# Patient Record
Sex: MALE | Race: WHITE | NOT HISPANIC OR LATINO | Employment: UNEMPLOYED | URBAN - NONMETROPOLITAN AREA
[De-identification: names, ages, dates, MRNs, and addresses within clinical notes are randomized per-mention and may not be internally consistent; named-entity substitution may affect disease eponyms.]

---

## 2017-08-29 PROBLEM — R79.89 LOW VITAMIN B12 LEVEL: Status: ACTIVE | Noted: 2017-08-29

## 2023-01-11 PROBLEM — Z86.0100 HISTORY OF COLON POLYPS: Status: ACTIVE | Noted: 2023-01-11

## 2023-11-28 ENCOUNTER — APPOINTMENT (OUTPATIENT)
Dept: GENERAL RADIOLOGY | Facility: HOSPITAL | Age: 48
End: 2023-11-28
Payer: MEDICAID

## 2023-11-28 ENCOUNTER — HOSPITAL ENCOUNTER (EMERGENCY)
Facility: HOSPITAL | Age: 48
Discharge: HOME | End: 2023-11-28
Attending: EMERGENCY MEDICINE | Admitting: EMERGENCY MEDICINE
Payer: MEDICAID

## 2023-11-28 ENCOUNTER — APPOINTMENT (OUTPATIENT)
Dept: CT IMAGING | Facility: HOSPITAL | Age: 48
End: 2023-11-28
Payer: MEDICAID

## 2023-11-28 VITALS
SYSTOLIC BLOOD PRESSURE: 145 MMHG | WEIGHT: 150 LBS | HEART RATE: 82 BPM | TEMPERATURE: 98.2 F | OXYGEN SATURATION: 95 % | DIASTOLIC BLOOD PRESSURE: 83 MMHG | BODY MASS INDEX: 21.47 KG/M2 | RESPIRATION RATE: 18 BRPM | HEIGHT: 70 IN

## 2023-11-28 DIAGNOSIS — K27.9 PUD (PEPTIC ULCER DISEASE): Chronic | ICD-10-CM

## 2023-11-28 DIAGNOSIS — K29.70 GASTRITIS, PRESENCE OF BLEEDING UNSPECIFIED, UNSPECIFIED CHRONICITY, UNSPECIFIED GASTRITIS TYPE: Primary | ICD-10-CM

## 2023-11-28 LAB
ALBUMIN SERPL-MCNC: 4.5 G/DL (ref 3.5–5.2)
ALBUMIN/GLOB SERPL: 1.1 G/DL
ALP SERPL-CCNC: 92 U/L (ref 39–117)
ALT SERPL W P-5'-P-CCNC: 19 U/L (ref 1–41)
ANION GAP SERPL CALCULATED.3IONS-SCNC: 13.6 MMOL/L (ref 5–15)
AST SERPL-CCNC: 21 U/L (ref 1–40)
BASOPHILS # BLD AUTO: 0.03 10*3/MM3 (ref 0–0.2)
BASOPHILS NFR BLD AUTO: 0.3 % (ref 0–1.5)
BILIRUB SERPL-MCNC: <0.2 MG/DL (ref 0–1.2)
BUN SERPL-MCNC: 13 MG/DL (ref 6–20)
BUN/CREAT SERPL: 14.6 (ref 7–25)
CALCIUM SPEC-SCNC: 9.2 MG/DL (ref 8.6–10.5)
CHLORIDE SERPL-SCNC: 97 MMOL/L (ref 98–107)
CO2 SERPL-SCNC: 25.4 MMOL/L (ref 22–29)
CREAT SERPL-MCNC: 0.89 MG/DL (ref 0.76–1.27)
DEPRECATED RDW RBC AUTO: 49.1 FL (ref 37–54)
EGFRCR SERPLBLD CKD-EPI 2021: 105.1 ML/MIN/1.73
EOSINOPHIL # BLD AUTO: 0.03 10*3/MM3 (ref 0–0.4)
EOSINOPHIL NFR BLD AUTO: 0.3 % (ref 0.3–6.2)
ERYTHROCYTE [DISTWIDTH] IN BLOOD BY AUTOMATED COUNT: 14.6 % (ref 12.3–15.4)
FLUAV SUBTYP SPEC NAA+PROBE: NOT DETECTED
FLUBV RNA ISLT QL NAA+PROBE: NOT DETECTED
GLOBULIN UR ELPH-MCNC: 4.1 GM/DL
GLUCOSE SERPL-MCNC: 129 MG/DL (ref 65–99)
HCT VFR BLD AUTO: 41.8 % (ref 37.5–51)
HGB BLD-MCNC: 13.9 G/DL (ref 13–17.7)
IMM GRANULOCYTES # BLD AUTO: 0.03 10*3/MM3 (ref 0–0.05)
IMM GRANULOCYTES NFR BLD AUTO: 0.3 % (ref 0–0.5)
LIPASE SERPL-CCNC: 17 U/L (ref 13–60)
LYMPHOCYTES # BLD AUTO: 1.25 10*3/MM3 (ref 0.7–3.1)
LYMPHOCYTES NFR BLD AUTO: 12.5 % (ref 19.6–45.3)
MCH RBC QN AUTO: 30.3 PG (ref 26.6–33)
MCHC RBC AUTO-ENTMCNC: 33.3 G/DL (ref 31.5–35.7)
MCV RBC AUTO: 91.3 FL (ref 79–97)
MONOCYTES # BLD AUTO: 0.31 10*3/MM3 (ref 0.1–0.9)
MONOCYTES NFR BLD AUTO: 3.1 % (ref 5–12)
NEUTROPHILS NFR BLD AUTO: 8.38 10*3/MM3 (ref 1.7–7)
NEUTROPHILS NFR BLD AUTO: 83.5 % (ref 42.7–76)
NRBC BLD AUTO-RTO: 0 /100 WBC (ref 0–0.2)
PLATELET # BLD AUTO: 288 10*3/MM3 (ref 140–450)
PMV BLD AUTO: 9.4 FL (ref 6–12)
POTASSIUM SERPL-SCNC: 3.5 MMOL/L (ref 3.5–5.2)
PROT SERPL-MCNC: 8.6 G/DL (ref 6–8.5)
RBC # BLD AUTO: 4.58 10*6/MM3 (ref 4.14–5.8)
S PYO AG THROAT QL: NEGATIVE
SARS-COV-2 RNA RESP QL NAA+PROBE: NOT DETECTED
SODIUM SERPL-SCNC: 136 MMOL/L (ref 136–145)
TROPONIN T SERPL HS-MCNC: 10 NG/L
WBC NRBC COR # BLD AUTO: 10.03 10*3/MM3 (ref 3.4–10.8)

## 2023-11-28 PROCEDURE — 84484 ASSAY OF TROPONIN QUANT: CPT

## 2023-11-28 PROCEDURE — 87081 CULTURE SCREEN ONLY: CPT

## 2023-11-28 PROCEDURE — 74177 CT ABD & PELVIS W/CONTRAST: CPT

## 2023-11-28 PROCEDURE — 25810000003 SODIUM CHLORIDE 0.9 % SOLUTION

## 2023-11-28 PROCEDURE — 96375 TX/PRO/DX INJ NEW DRUG ADDON: CPT

## 2023-11-28 PROCEDURE — 96374 THER/PROPH/DIAG INJ IV PUSH: CPT

## 2023-11-28 PROCEDURE — 85025 COMPLETE CBC W/AUTO DIFF WBC: CPT

## 2023-11-28 PROCEDURE — 99285 EMERGENCY DEPT VISIT HI MDM: CPT

## 2023-11-28 PROCEDURE — 83690 ASSAY OF LIPASE: CPT

## 2023-11-28 PROCEDURE — 87636 SARSCOV2 & INF A&B AMP PRB: CPT

## 2023-11-28 PROCEDURE — 87880 STREP A ASSAY W/OPTIC: CPT

## 2023-11-28 PROCEDURE — 80053 COMPREHEN METABOLIC PANEL: CPT

## 2023-11-28 PROCEDURE — 93005 ELECTROCARDIOGRAM TRACING: CPT

## 2023-11-28 PROCEDURE — 96361 HYDRATE IV INFUSION ADD-ON: CPT

## 2023-11-28 PROCEDURE — 71045 X-RAY EXAM CHEST 1 VIEW: CPT

## 2023-11-28 PROCEDURE — 25510000001 IOPAMIDOL 61 % SOLUTION: Performed by: EMERGENCY MEDICINE

## 2023-11-28 PROCEDURE — 25010000002 ONDANSETRON PER 1 MG

## 2023-11-28 RX ORDER — PANTOPRAZOLE SODIUM 40 MG/1
40 TABLET, DELAYED RELEASE ORAL DAILY
Qty: 90 TABLET | Refills: 0 | Status: SHIPPED | OUTPATIENT
Start: 2023-11-28

## 2023-11-28 RX ORDER — ONDANSETRON 2 MG/ML
4 INJECTION INTRAMUSCULAR; INTRAVENOUS ONCE
Status: COMPLETED | OUTPATIENT
Start: 2023-11-28 | End: 2023-11-28

## 2023-11-28 RX ORDER — SUCRALFATE 1 G/1
1 TABLET ORAL 3 TIMES DAILY
Qty: 42 TABLET | Refills: 0 | Status: SHIPPED | OUTPATIENT
Start: 2023-11-28 | End: 2023-12-12

## 2023-11-28 RX ORDER — ONDANSETRON 4 MG/1
4 TABLET, ORALLY DISINTEGRATING ORAL EVERY 8 HOURS PRN
Qty: 20 TABLET | Refills: 0 | Status: SHIPPED | OUTPATIENT
Start: 2023-11-28

## 2023-11-28 RX ORDER — PANTOPRAZOLE SODIUM 40 MG/10ML
40 INJECTION, POWDER, LYOPHILIZED, FOR SOLUTION INTRAVENOUS ONCE
Status: COMPLETED | OUTPATIENT
Start: 2023-11-28 | End: 2023-11-28

## 2023-11-28 RX ORDER — ACETAMINOPHEN 325 MG/1
975 TABLET ORAL ONCE
Status: COMPLETED | OUTPATIENT
Start: 2023-11-28 | End: 2023-11-28

## 2023-11-28 RX ADMIN — ONDANSETRON 4 MG: 2 INJECTION INTRAMUSCULAR; INTRAVENOUS at 22:38

## 2023-11-28 RX ADMIN — ACETAMINOPHEN 975 MG: 325 TABLET, FILM COATED ORAL at 21:41

## 2023-11-28 RX ADMIN — PANTOPRAZOLE SODIUM 40 MG: 40 INJECTION, POWDER, FOR SOLUTION INTRAVENOUS at 23:26

## 2023-11-28 RX ADMIN — IOPAMIDOL 100 ML: 612 INJECTION, SOLUTION INTRAVENOUS at 22:25

## 2023-11-28 RX ADMIN — SODIUM CHLORIDE 500 ML: 9 INJECTION, SOLUTION INTRAVENOUS at 21:46

## 2023-11-29 NOTE — ED PROVIDER NOTES
"Subjective  History of Present Illness:    This is a 48-year-old female present emergency room today for evaluation of nausea vomiting abdominal pain.  Presents via emergency medical services.  1 day history of vomiting.  Had cough congestion runny nose for the last week.  No sore throat.  No chest pain no shortness of air.  No diarrhea.  No hemoptysis, no hematemesis, no melena or hematochezia.  No dysuria hematuria urgency or frequency.  Describes his abdominal pain is \"queasy feeling\".  Reports just feeling overall fatigued and rundown.  No known sick contacts.  Received 4 mg of Zofran IV prior to arrival.  Denies any IV drug use.  Denies any alcohol.  Pain is described as burning sensation.  He does also tell me that he has been out of his Protonix recently.      Nurses Notes reviewed and agree, including vitals, allergies, social history and prior medical history.     REVIEW OF SYSTEMS: All systems reviewed and not pertinent unless noted.  Review of Systems   Constitutional:  Positive for fatigue. Negative for fever.   HENT:  Positive for congestion and rhinorrhea. Negative for sore throat and trouble swallowing.    Respiratory:  Positive for cough. Negative for shortness of breath.    Cardiovascular:  Negative for chest pain.   Gastrointestinal:  Positive for abdominal pain, nausea and vomiting. Negative for diarrhea.   Genitourinary:  Negative for dysuria, frequency, hematuria and urgency.   All other systems reviewed and are negative.      Past Medical History:   Diagnosis Date    Anemia     Anxiety     Asthma     Back pain     Bleeding ulcer     Body piercing 10/06/2021    ears    CHF (congestive heart failure)     Reported by patient's wife    Chronic venous insufficiency     Colitis     Constipation     COPD (chronic obstructive pulmonary disease)     Whiteclay or callus     Coronary artery disease     Depression     GERD (gastroesophageal reflux disease)     GI bleed     Gout     Headache     History of ASCVD " "(atherosclerotic cardiovascular disease)     History of blood transfusion     Spouse reported no reaction to transfuson     History of fracture 10/06/2021    Hx right pinky fracture - did require surgery    History of heart attack     Spouse reported \"2 light heart attacks at age 30\"     History of stomach ulcers     Hyperlipidemia     Hypertension     Migraine headache     Mild aortic valve sclerosis     Mild mitral insufficiency     Spouse reported MVP and that Dr Preciado said not sever enough to warrant surgery    On home oxygen therapy     Spouse reported at 2L/min NC prn    Poor historian 10/06/2021    Snores     Tattoos     Vision problems     Weight loss     Recent Weight Loss Involuntary For Over A Year Or More       Allergies:    Sulfa antibiotics      Past Surgical History:   Procedure Laterality Date    APPENDECTOMY      COLONOSCOPY      ENDOSCOPY      ENDOSCOPY N/A 11/5/2021    Procedure: ESOPHAGOGASTRODUODENOSCOPY WITH BIOPSY;  Surgeon: Leonor Graff MD;  Location: [facility] ENDOSCOPY;  Service: Gastroenterology;  Laterality: N/A;    ENDOSCOPY N/A 1/19/2023    Procedure: ESOPHAGOGASTRODUODENOSCOPY WITH BIOPSIES;  Surgeon: Leonor Graff MD;  Location: [facility] ENDOSCOPY;  Service: Gastroenterology;  Laterality: N/A;    FRACTURE SURGERY Right     Pinky finger    WISDOM TOOTH EXTRACTION           Social History     Socioeconomic History    Marital status:    Tobacco Use    Smoking status: Every Day     Packs/day: 0.50     Years: 15.00     Additional pack years: 0.00     Total pack years: 7.50     Types: Cigarettes     Start date: 1991     Passive exposure: Current    Smokeless tobacco: Current     Types: Snuff   Vaping Use    Vaping Use: Former    Substances: No Nicotine (Spouse reported no use in 2 years)   Substance and Sexual Activity    Alcohol use: Not Currently     Comment: social    Drug use: Not Currently     Comment: opiates, snort    Sexual activity: Defer         Family " "History   Problem Relation Age of Onset    Arthritis Mother     Thyroid disease Mother     Hypertension Mother     Migraines Sister     Thyroid disease Sister     Hypertension Sister     Mental illness Brother     Cancer Paternal Uncle     Arthritis Maternal Grandmother     Stroke Maternal Grandmother     Colon cancer Neg Hx     Cirrhosis Neg Hx     Liver cancer Neg Hx     Liver disease Neg Hx        Objective  Physical Exam:  /83   Pulse 82   Temp 98.2 °F (36.8 °C) (Oral)   Resp 18   Ht 177.8 cm (70\")   Wt 68 kg (150 lb)   SpO2 95%   BMI 21.52 kg/m²      Physical Exam  Vitals and nursing note reviewed.   Constitutional:       General: He is not in acute distress.     Appearance: He is not toxic-appearing or diaphoretic.      Comments: Chronically ill-appearing   HENT:      Head: Normocephalic and atraumatic.      Nose: Nose normal. Congestion present. No rhinorrhea.      Mouth/Throat:      Mouth: Mucous membranes are moist.      Pharynx: Oropharynx is clear. Posterior oropharyngeal erythema present. No oropharyngeal exudate.   Eyes:      Extraocular Movements: Extraocular movements intact.   Cardiovascular:      Rate and Rhythm: Normal rate and regular rhythm.      Pulses: Normal pulses.      Heart sounds: Normal heart sounds.   Pulmonary:      Effort: Pulmonary effort is normal. No respiratory distress.      Breath sounds: Normal breath sounds. No stridor. No wheezing, rhonchi or rales.   Abdominal:      General: There is no distension.      Palpations: Abdomen is soft.      Tenderness: There is abdominal tenderness. There is no guarding or rebound.   Musculoskeletal:         General: Normal range of motion.      Cervical back: Normal range of motion and neck supple. No rigidity or tenderness.   Skin:     General: Skin is warm and dry.      Capillary Refill: Capillary refill takes less than 2 seconds.   Neurological:      General: No focal deficit present.      Mental Status: He is alert and oriented " to person, place, and time.   Psychiatric:         Mood and Affect: Mood normal.         Behavior: Behavior normal.         Thought Content: Thought content normal.         Judgment: Judgment normal.               Procedures    ED Course:    ED Course as of 11/28/23 2337 Tue Nov 28, 2023 2334 EKG interpreted by me reveals sinus rhythm 81 no ectopy no ischemic changes. [PF]      ED Course User Index  [PF] Angel Fan,        Lab Results (last 24 hours)       Procedure Component Value Units Date/Time    CBC Auto Differential [[id number]]  (Abnormal) Collected: 11/28/23 2138    Specimen: Blood Updated: 11/28/23 2152     WBC 10.03 10*3/mm3      RBC 4.58 10*6/mm3      Hemoglobin 13.9 g/dL      Hematocrit 41.8 %      MCV 91.3 fL      MCH 30.3 pg      MCHC 33.3 g/dL      RDW 14.6 %      RDW-SD 49.1 fl      MPV 9.4 fL      Platelets 288 10*3/mm3      Neutrophil % 83.5 %      Lymphocyte % 12.5 %      Monocyte % 3.1 %      Eosinophil % 0.3 %      Basophil % 0.3 %      Immature Grans % 0.3 %      Neutrophils, Absolute 8.38 10*3/mm3      Lymphocytes, Absolute 1.25 10*3/mm3      Monocytes, Absolute 0.31 10*3/mm3      Eosinophils, Absolute 0.03 10*3/mm3      Basophils, Absolute 0.03 10*3/mm3      Immature Grans, Absolute 0.03 10*3/mm3      nRBC 0.0 /100 WBC     Comprehensive Metabolic Panel [[id number]]  (Abnormal) Collected: 11/28/23 2138    Specimen: Blood Updated: 11/28/23 2214     Glucose 129 mg/dL      BUN 13 mg/dL      Creatinine 0.89 mg/dL      Sodium 136 mmol/L      Potassium 3.5 mmol/L      Chloride 97 mmol/L      CO2 25.4 mmol/L      Calcium 9.2 mg/dL      Total Protein 8.6 g/dL      Albumin 4.5 g/dL      ALT (SGPT) 19 U/L      AST (SGOT) 21 U/L      Alkaline Phosphatase 92 U/L      Total Bilirubin <0.2 mg/dL      Globulin 4.1 gm/dL      A/G Ratio 1.1 g/dL      BUN/Creatinine Ratio 14.6     Anion Gap 13.6 mmol/L      eGFR 105.1 mL/min/1.73     Narrative:      GFR Normal >60  Chronic Kidney Disease <60  Kidney  Failure <15      Lipase [[id number]]  (Normal) Collected: 11/28/23 2138    Specimen: Blood Updated: 11/28/23 2214     Lipase 17 U/L     High Sensitivity Troponin T [[id number]]  (Normal) Collected: 11/28/23 2138    Specimen: Blood Updated: 11/28/23 2225     HS Troponin T 10 ng/L     Narrative:      High Sensitive Troponin T Reference Range:  <14.0 ng/L- Negative Female for AMI  <22.0 ng/L- Negative Male for AMI  >=14 - Abnormal Female indicating possible myocardial injury.  >=22 - Abnormal Male indicating possible myocardial injury.   Clinicians would have to utilize clinical acumen, EKG, Troponin, and serial changes to determine if it is an Acute Myocardial Infarction or myocardial injury due to an underlying chronic condition.         COVID-19 and FLU A/B PCR, 1 HR TAT - Swab, Nasopharynx [[id number]]  (Normal) Collected: 11/28/23 2141    Specimen: Swab from Nasopharynx Updated: 11/28/23 2214     COVID19 Not Detected     Influenza A PCR Not Detected     Influenza B PCR Not Detected    Narrative:      Fact sheet for providers: https://www.fda.gov/media/416293/download    Fact sheet for patients: https://www.fda.gov/media/568153/download    Test performed by PCR.    Rapid Strep A Screen - Swab, Throat [[id number]]  (Normal) Collected: 11/28/23 2141    Specimen: Swab from Throat Updated: 11/28/23 2158     Strep A Ag Negative    Beta Strep Culture, Throat - Swab, Throat [[id number]] Collected: 11/28/23 2141    Specimen: Swab from Throat Updated: 11/28/23 2158             CT Abdomen Pelvis With Contrast    Result Date: 11/28/2023  FINAL REPORT TECHNIQUE: Axial CT images were performed from the lung bases through the symphysis pubis after the administration of intravenous contrast.  This study was performed with techniques to keep radiation doses as low as reasonably achievable (ALARA). Individualized dose reduction techniques using automated exposure control or adjustment of mA and/or kV according to the patient's size were  employed. CLINICAL HISTORY: abdominal pain nausea and vomiting, epigastric pain primarily rule out pancrea FINDINGS: LOWER CHEST: The heart is normal size. There are groundglass opacities present in the lung bases.    ABDOMEN/PELVIS:  Liver, gallbladder and bile ducts: The liver enhances homogeneously without suspicious focal hepatic lesion.  The gallbladder is unremarkable.  There is no definite biliary duct dilatation. Adrenal glands: The adrenal glands are morphologically unremarkable without suspicious lesion.  Kidneys, ureter and urinary bladder: There are nonobstructing right kidney stones. No suspicious renal lesion.  No hydronephrosis.  Urinary bladder is unremarkable.  Spleen: The spleen is normal size.  Pancreas: The pancreas is grossly unremarkable.  GI systems and mesentery: There is distal gastric wall thickening.  There appears to be a focus of soft tissue enhancement adjacent to the distal gastric antrum and pylorus that may be submucosal in location.  This measures about 18 mm and is best visualized on image 31 of series 2.  No evidence of bowel obstruction.  The appendix is not visualized but there are no secondary signs of appendicitis. No significant mesenteric inflammation.  Lymph nodes: No definite pathologically enlarged abdominal or pelvic lymph nodes present.  Vessels: The aorta and abdominal arteries are grossly patent.  The IVC and portal vein are patent and grossly unremarkable.  Peritoneum: No free intraperitoneal fluid or pneumoperitoneum.  Pelvic viscera: No acute findings.  Body wall: No body wall contusion. No significant body wall hernias. Bones: No acute fracture.     Impression: Findings are concerning for gastritis.  Nodular focus of soft tissue enhancement adjacent to the distal stomach could represent an enlarged lymph node.  Upper endoscopy is recommended. Authenticated and Electronically Signed by Jerry Call MD on 11/28/2023 11:31:28 PM        MDM     Amount and/or  Complexity of Data Reviewed  Clinical lab tests: reviewed  Tests in the medicine section of CPT®: reviewed        Initial impression of presenting illness: 48-year-old male present emergency room today for evaluation of nausea vomiting abdominal pain    DDX: includes but is not limited to: Colitis gastroenteritis, peptic ulcer disease, viral upper respiratory infection, pneumonia, COVID, flu, enteritis, pancreatitis, intra-abdominal infection, others    Patient arrives hemodynamically stable afebrile nontachycardic nonhypoxic nontoxic-appearing with vitals interpreted by myself.     Pertinent features from physical exam: Chronically ill-appearing 48-year-old male.  Alert and orient x 4.  Abdomen soft, minimally tender in a generalized fashion, no hernias palpated.  Cardiac oscitation regular rate and rhythm lungs were clear bilaterally.  Moist mucous membranes.  Moves all extremities without any difficulty.  Answers questions appropriately..    Initial diagnostic plan: CBC CMP lipase rapid strep COVID flu CT abdomen pelvis with contrast and a chest x-ray    Results from initial plan were reviewed and interpreted by me revealing CBC unremarkable, CMP with a glucose of 129 chloride of 97 and total protein of 8.6.  COVID flu negative.  Troponin negative, rapid strep negative, lipase negative.  Patient did not give urine in the emergency department but denies any symptoms.  All labs appear reassuring.  CT abdomen pelvis per radiology with IMPRESSION: Findings are concerning for gastritis.  Nodular focus of soft tissue enhancement adjacent to the distal stomach could represent an enlarged lymph   node. Upper endoscopy is recommended.  Chest x-ray per my interpretation negative.    Suspect the patient's coughing could also be related to GERD like symptoms and gastritis.    Diagnostic information from other sources: Old record reviewed    Interventions / Re-evaluation: 500 cc fluid bolus, Tylenol.  Zofran prior to arrival.   Protonix 40 mg IV suspected gastritis.  Patient did call out for some burning pain radiating up into his chest, EKG and troponins were obtained, troponin was normal and EKG also normal sinus rhythm rate of 81 with no ectopy and no ischemic changes.  Suspect that gastritis is likely the cause of this pain.  Correlates well to his epigastric tenderness to palpation.  Given that he has no melena, no hematochezia, and no bleeding with a stable H&H believe he would be safe to follow-up outpatient with Protonix and Carafate on board.  Given strict return precautions such as development of melanotic or black stools or hematemesis.  He is feeling much better at this time and stable for discharge.    Results/clinical rationale were discussed with patient at bedside.  Discussed that the CT scan did recommend upper GI scope and recommended they follow-up closely with GI and be compliant with outpatient medical therapy.    Consultations/Discussion of results with other physicians: N/A    Disposition plan: Discharge.  GI follow-up.  Sent Protonix and Carafate.  Given return precautions.  Discussed supportive measures.  -----    Final diagnoses:   Gastritis, presence of bleeding unspecified, unspecified chronicity, unspecified gastritis type          Bon Toscano PA-C  11/28/23 1762

## 2023-11-29 NOTE — DISCHARGE INSTRUCTIONS
Follow-up with GI, they recommended that you get an EGD or an upper scope series done to further evaluate your gastritis.  I have refilled your Protonix and additionally sent a medication called Carafate, you should take both of these as directed to help with your symptoms.

## 2023-11-30 LAB — BACTERIA SPEC AEROBE CULT: NORMAL

## 2023-12-14 ENCOUNTER — APPOINTMENT (OUTPATIENT)
Dept: ULTRASOUND IMAGING | Facility: HOSPITAL | Age: 48
End: 2023-12-14
Payer: MEDICAID

## 2023-12-14 ENCOUNTER — HOSPITAL ENCOUNTER (EMERGENCY)
Facility: HOSPITAL | Age: 48
Discharge: HOME | End: 2023-12-14
Attending: EMERGENCY MEDICINE
Payer: MEDICAID

## 2023-12-14 VITALS
RESPIRATION RATE: 19 BRPM | OXYGEN SATURATION: 99 % | DIASTOLIC BLOOD PRESSURE: 95 MMHG | HEIGHT: 70 IN | BODY MASS INDEX: 21.47 KG/M2 | HEART RATE: 90 BPM | WEIGHT: 150 LBS | TEMPERATURE: 98.2 F | SYSTOLIC BLOOD PRESSURE: 130 MMHG

## 2023-12-14 DIAGNOSIS — N45.3 EPIDIDYMOORCHITIS: Primary | ICD-10-CM

## 2023-12-14 LAB
ALBUMIN SERPL-MCNC: 3.7 G/DL (ref 3.5–5.2)
ALBUMIN/GLOB SERPL: 0.9 G/DL
ALP SERPL-CCNC: 84 U/L (ref 39–117)
ALT SERPL W P-5'-P-CCNC: 10 U/L (ref 1–41)
ANION GAP SERPL CALCULATED.3IONS-SCNC: 11.4 MMOL/L (ref 5–15)
AST SERPL-CCNC: 12 U/L (ref 1–40)
BACTERIA UR QL AUTO: ABNORMAL /HPF
BASOPHILS # BLD AUTO: 0.02 10*3/MM3 (ref 0–0.2)
BASOPHILS NFR BLD AUTO: 0.1 % (ref 0–1.5)
BILIRUB SERPL-MCNC: <0.2 MG/DL (ref 0–1.2)
BILIRUB UR QL STRIP: NEGATIVE
BUN SERPL-MCNC: 13 MG/DL (ref 6–20)
BUN/CREAT SERPL: 14.9 (ref 7–25)
CALCIUM SPEC-SCNC: 9.4 MG/DL (ref 8.6–10.5)
CHLORIDE SERPL-SCNC: 95 MMOL/L (ref 98–107)
CLARITY UR: ABNORMAL
CO2 SERPL-SCNC: 23.6 MMOL/L (ref 22–29)
COLOR UR: YELLOW
CREAT SERPL-MCNC: 0.87 MG/DL (ref 0.76–1.27)
DEPRECATED RDW RBC AUTO: 49.1 FL (ref 37–54)
EGFRCR SERPLBLD CKD-EPI 2021: 105.8 ML/MIN/1.73
EOSINOPHIL # BLD AUTO: 0.03 10*3/MM3 (ref 0–0.4)
EOSINOPHIL NFR BLD AUTO: 0.2 % (ref 0.3–6.2)
ERYTHROCYTE [DISTWIDTH] IN BLOOD BY AUTOMATED COUNT: 14.7 % (ref 12.3–15.4)
GLOBULIN UR ELPH-MCNC: 4.3 GM/DL
GLUCOSE SERPL-MCNC: 107 MG/DL (ref 65–99)
GLUCOSE UR STRIP-MCNC: NEGATIVE MG/DL
HCT VFR BLD AUTO: 34.8 % (ref 37.5–51)
HGB BLD-MCNC: 11.4 G/DL (ref 13–17.7)
HGB UR QL STRIP.AUTO: NEGATIVE
HYALINE CASTS UR QL AUTO: ABNORMAL /LPF
IMM GRANULOCYTES # BLD AUTO: 0.14 10*3/MM3 (ref 0–0.05)
IMM GRANULOCYTES NFR BLD AUTO: 1 % (ref 0–0.5)
KETONES UR QL STRIP: NEGATIVE
LEUKOCYTE ESTERASE UR QL STRIP.AUTO: ABNORMAL
LYMPHOCYTES # BLD AUTO: 1.59 10*3/MM3 (ref 0.7–3.1)
LYMPHOCYTES NFR BLD AUTO: 11.4 % (ref 19.6–45.3)
MCH RBC QN AUTO: 29.7 PG (ref 26.6–33)
MCHC RBC AUTO-ENTMCNC: 32.8 G/DL (ref 31.5–35.7)
MCV RBC AUTO: 90.6 FL (ref 79–97)
MONOCYTES # BLD AUTO: 0.65 10*3/MM3 (ref 0.1–0.9)
MONOCYTES NFR BLD AUTO: 4.7 % (ref 5–12)
NEUTROPHILS NFR BLD AUTO: 11.46 10*3/MM3 (ref 1.7–7)
NEUTROPHILS NFR BLD AUTO: 82.6 % (ref 42.7–76)
NITRITE UR QL STRIP: POSITIVE
NRBC BLD AUTO-RTO: 0 /100 WBC (ref 0–0.2)
PH UR STRIP.AUTO: 7.5 [PH] (ref 5–8)
PLATELET # BLD AUTO: 361 10*3/MM3 (ref 140–450)
PMV BLD AUTO: 9.2 FL (ref 6–12)
POTASSIUM SERPL-SCNC: 3.7 MMOL/L (ref 3.5–5.2)
PROT SERPL-MCNC: 8 G/DL (ref 6–8.5)
PROT UR QL STRIP: ABNORMAL
RBC # BLD AUTO: 3.84 10*6/MM3 (ref 4.14–5.8)
RBC # UR STRIP: ABNORMAL /HPF
REF LAB TEST METHOD: ABNORMAL
SODIUM SERPL-SCNC: 130 MMOL/L (ref 136–145)
SP GR UR STRIP: 1.01 (ref 1–1.03)
SQUAMOUS #/AREA URNS HPF: ABNORMAL /HPF
UROBILINOGEN UR QL STRIP: ABNORMAL
WBC # UR STRIP: ABNORMAL /HPF
WBC NRBC COR # BLD AUTO: 13.89 10*3/MM3 (ref 3.4–10.8)

## 2023-12-14 PROCEDURE — 76870 US EXAM SCROTUM: CPT

## 2023-12-14 PROCEDURE — 96374 THER/PROPH/DIAG INJ IV PUSH: CPT

## 2023-12-14 PROCEDURE — 85025 COMPLETE CBC W/AUTO DIFF WBC: CPT | Performed by: EMERGENCY MEDICINE

## 2023-12-14 PROCEDURE — 25010000002 CEFTRIAXONE SODIUM-DEXTROSE 1-3.74 GM-%(50ML) RECONSTITUTED SOLUTION: Performed by: PHYSICIAN ASSISTANT

## 2023-12-14 PROCEDURE — 96375 TX/PRO/DX INJ NEW DRUG ADDON: CPT

## 2023-12-14 PROCEDURE — 25010000002 MORPHINE PER 10 MG: Performed by: EMERGENCY MEDICINE

## 2023-12-14 PROCEDURE — 99284 EMERGENCY DEPT VISIT MOD MDM: CPT

## 2023-12-14 PROCEDURE — 81001 URINALYSIS AUTO W/SCOPE: CPT | Performed by: PHYSICIAN ASSISTANT

## 2023-12-14 PROCEDURE — 80053 COMPREHEN METABOLIC PANEL: CPT | Performed by: EMERGENCY MEDICINE

## 2023-12-14 RX ORDER — HYDROCODONE BITARTRATE AND ACETAMINOPHEN 5; 325 MG/1; MG/1
1 TABLET ORAL ONCE
Status: COMPLETED | OUTPATIENT
Start: 2023-12-14 | End: 2023-12-14

## 2023-12-14 RX ORDER — CEFTRIAXONE 1 G/50ML
1000 INJECTION, SOLUTION INTRAVENOUS ONCE
Status: COMPLETED | OUTPATIENT
Start: 2023-12-14 | End: 2023-12-14

## 2023-12-14 RX ORDER — SODIUM CHLORIDE 0.9 % (FLUSH) 0.9 %
10 SYRINGE (ML) INJECTION AS NEEDED
Status: DISCONTINUED | OUTPATIENT
Start: 2023-12-14 | End: 2023-12-14 | Stop reason: HOSPADM

## 2023-12-14 RX ORDER — LEVOFLOXACIN 500 MG/1
500 TABLET, FILM COATED ORAL DAILY
Qty: 10 TABLET | Refills: 0 | Status: SHIPPED | OUTPATIENT
Start: 2023-12-14 | End: 2023-12-24

## 2023-12-14 RX ADMIN — HYDROCODONE BITARTRATE AND ACETAMINOPHEN 1 TABLET: 5; 325 TABLET ORAL at 18:31

## 2023-12-14 RX ADMIN — MORPHINE SULFATE 4 MG: 4 INJECTION, SOLUTION INTRAMUSCULAR; INTRAVENOUS at 20:14

## 2023-12-14 RX ADMIN — CEFTRIAXONE 1000 MG: 1 INJECTION, SOLUTION INTRAVENOUS at 20:35

## 2023-12-14 NOTE — ED PROVIDER NOTES
Subjective  History of Present Illness:    Chief Complaint:   Chief Complaint   Patient presents with    Groin Swelling     Pt stated he has had right testicular pain for the past four days. Pt stated he is also nausea      History of Present Illness: Americo Davis Jr. is a 48 y.o. male who presents to the emergency department complaining of right testicular pain and swelling.  Patient states awoke 4 days ago with pain without known injury.  No abdominal pain.  Does have some dysuria.  No history of similar.  Is not sexually active has no concerns for STDs.  No penile drainage or discharge  Onset: Days ago  Duration: Ongoing  Exacerbating / Alleviating factors: Worse with any palpation or movement   Associated symptoms: dysuria      Nurses Notes reviewed and agree, including vitals, allergies, social history and prior medical history.     Review of Systems   Constitutional: Negative.    HENT: Negative.     Eyes: Negative.    Respiratory: Negative.     Cardiovascular: Negative.    Gastrointestinal: Negative.    Genitourinary:  Positive for dysuria, scrotal swelling and testicular pain.   Musculoskeletal: Negative.    Skin: Negative.    Allergic/Immunologic: Negative.    Neurological: Negative.    Psychiatric/Behavioral: Negative.     All other systems reviewed and are negative.      Past Medical History:   Diagnosis Date    Anemia     Anxiety     Asthma     Back pain     Bleeding ulcer     Body piercing 10/06/2021    ears    CHF (congestive heart failure)     Reported by patient's wife    Chronic venous insufficiency     Colitis     Constipation     COPD (chronic obstructive pulmonary disease)     [place] or callus     Coronary artery disease     Depression     GERD (gastroesophageal reflux disease)     GI bleed     Gout     Headache     History of ASCVD (atherosclerotic cardiovascular disease)     History of blood transfusion     Spouse reported no reaction to transfuson     History of fracture 10/06/2021    Hx  "right pinky fracture - did require surgery    History of heart attack     Spouse reported \"2 light heart attacks at age 30\"     History of stomach ulcers     Hyperlipidemia     Hypertension     Migraine headache     Mild aortic valve sclerosis     Mild mitral insufficiency     Spouse reported MVP and that Dr Preciado said not sever enough to warrant surgery    On home oxygen therapy     Spouse reported at 2L/min NC prn    Poor historian 10/06/2021    Snores     Tattoos     Vision problems     Weight loss     Recent Weight Loss Involuntary For Over A Year Or More       Allergies:    Sulfa antibiotics      Past Surgical History:   Procedure Laterality Date    APPENDECTOMY      COLONOSCOPY      ENDOSCOPY      ENDOSCOPY N/A 11/5/2021    Procedure: ESOPHAGOGASTRODUODENOSCOPY WITH BIOPSY;  Surgeon: Leonor Graff MD;  Location: [facility] ENDOSCOPY;  Service: Gastroenterology;  Laterality: N/A;    ENDOSCOPY N/A 1/19/2023    Procedure: ESOPHAGOGASTRODUODENOSCOPY WITH BIOPSIES;  Surgeon: Leonor Graff MD;  Location: [facility] ENDOSCOPY;  Service: Gastroenterology;  Laterality: N/A;    FRACTURE SURGERY Right     Pinky finger    WISDOM TOOTH EXTRACTION           Social History     Socioeconomic History    Marital status:    Tobacco Use    Smoking status: Every Day     Packs/day: 0.50     Years: 15.00     Additional pack years: 0.00     Total pack years: 7.50     Types: Cigarettes     Start date: 1991     Passive exposure: Current    Smokeless tobacco: Current     Types: Snuff   Vaping Use    Vaping Use: Former    Substances: No Nicotine (Spouse reported no use in 2 years)   Substance and Sexual Activity    Alcohol use: Not Currently     Comment: social    Drug use: Not Currently     Comment: opiates, snort    Sexual activity: Defer         Family History   Problem Relation Age of Onset    Arthritis Mother     Thyroid disease Mother     Hypertension Mother     Migraines Sister     Thyroid disease Sister     " "Hypertension Sister     Mental illness Brother     Cancer Paternal Uncle     Arthritis Maternal Grandmother     Stroke Maternal Grandmother     Colon cancer Neg Hx     Cirrhosis Neg Hx     Liver cancer Neg Hx     Liver disease Neg Hx        Objective  Physical Exam:  /90   Pulse 88   Temp 98.9 °F (37.2 °C)   Resp 18   Ht 177.8 cm (70\")   Wt 68 kg (150 lb)   SpO2 98%   BMI 21.52 kg/m²      Physical Exam  Vitals and nursing note reviewed. Exam conducted with a chaperone present.   Constitutional:       General: He is not in acute distress.     Appearance: Normal appearance. He is normal weight. He is not ill-appearing, toxic-appearing or diaphoretic.   HENT:      Head: Normocephalic and atraumatic.      Nose: Nose normal.   Eyes:      Extraocular Movements: Extraocular movements intact.   Cardiovascular:      Rate and Rhythm: Normal rate and regular rhythm.   Pulmonary:      Effort: Pulmonary effort is normal.   Abdominal:      General: Abdomen is flat.      Palpations: Abdomen is soft.      Tenderness: There is no abdominal tenderness.      Hernia: There is no hernia in the right inguinal area.   Genitourinary:     Penis: Normal and circumcised.       Testes:         Right: Tenderness and swelling present.      Epididymis:      Right: Enlarged. Tenderness present.   Musculoskeletal:         General: Normal range of motion.      Cervical back: Normal range of motion.   Skin:     General: Skin is warm and dry.   Neurological:      General: No focal deficit present.      Mental Status: He is alert.   Psychiatric:         Mood and Affect: Mood normal.         Behavior: Behavior normal.           Procedures    ED Course:    ED Course as of 12/14/23 2027   Thu Dec 14, 2023   1836 WBC, UA(!): 11-20 [TM]   1836 Bacteria, UA(!): 1+ [TM]   1836 Nitrite, UA(!): Positive [TM]   2007 WBC(!): 13.89 [TM]      ED Course User Index  [TM] Marlon Marrero PA-C       Lab Results (last 24 hours)       Procedure " Component Value Units Date/Time    Urinalysis With Microscopic If Indicated (No Culture) - Urine, Clean Catch [[id number]]  (Abnormal) Collected: 12/14/23 1804    Specimen: Urine, Clean Catch Updated: 12/14/23 1817     Color, UA Yellow     Appearance, UA Cloudy     pH, UA 7.5     Specific Gravity, UA 1.015     Glucose, UA Negative     Ketones, UA Negative     Bilirubin, UA Negative     Blood, UA Negative     Protein, UA 30 mg/dL (1+)     Leuk Esterase, UA Large (3+)     Nitrite, UA Positive     Urobilinogen, UA 0.2 E.U./dL    Urinalysis, Microscopic Only - Urine, Clean Catch [[id number]]  (Abnormal) Collected: 12/14/23 1804    Specimen: Urine, Clean Catch Updated: 12/14/23 1834     RBC, UA None Seen /HPF      WBC, UA 11-20 /HPF      Bacteria, UA 1+ /HPF      Squamous Epithelial Cells, UA 7-12 /HPF      Hyaline Casts, UA None Seen /LPF      Methodology Manual Light Microscopy    CBC Auto Differential [[id number]]  (Abnormal) Collected: 12/14/23 1959    Specimen: Blood Updated: 12/14/23 2007     WBC 13.89 10*3/mm3      RBC 3.84 10*6/mm3      Hemoglobin 11.4 g/dL      Hematocrit 34.8 %      MCV 90.6 fL      MCH 29.7 pg      MCHC 32.8 g/dL      RDW 14.7 %      RDW-SD 49.1 fl      MPV 9.2 fL      Platelets 361 10*3/mm3      Neutrophil % 82.6 %      Lymphocyte % 11.4 %      Monocyte % 4.7 %      Eosinophil % 0.2 %      Basophil % 0.1 %      Immature Grans % 1.0 %      Neutrophils, Absolute 11.46 10*3/mm3      Lymphocytes, Absolute 1.59 10*3/mm3      Monocytes, Absolute 0.65 10*3/mm3      Eosinophils, Absolute 0.03 10*3/mm3      Basophils, Absolute 0.02 10*3/mm3      Immature Grans, Absolute 0.14 10*3/mm3      nRBC 0.0 /100 WBC     Comprehensive Metabolic Panel [[id number]] Collected: 12/14/23 1959    Specimen: Blood Updated: 12/14/23 2002             US Scrotum & Testicles    Result Date: 12/14/2023  FINAL REPORT TECHNIQUE: Sonographic images of the scrotum and its contents wereobtained. CLINICAL HISTORY: right testicular  pain and swelling FINDINGS: The left testis and epididymis are normal, except for tiny epididymal cysts.  The right epididymis is hyperemic and heterogeneous.  The right testis is hyperemic, findings consistent with right epididymoorchitis.  There is no significant intrascrotal fluid.     Impression: Right epididymoorchitis. Authenticated and Electronically Signed by Jaxson Black M.D. on 12/14/2023 08:07:21 PM        Medical Decision Making  Amount and/or Complexity of Data Reviewed  Labs:  Decision-making details documented in ED Course.  Radiology: ordered.    Risk  Prescription drug management.        Americo Davis Jr. is a 48 y.o. male who presents to the emergency department for evaluation of right testicle pain and swelling    Differential diagnosis includes testicle torsion, epididymitis, orchitis among other etiologies.    Ultrasound of the scrotum and testes, CBC, CMP ordered for further evaluation of the patient's presentation.    Chart review if available included outside testing, previous visits, prior labs, prior imaging, available notes from prior evaluations or visits with specialists, medication list, allergies, past medical history, past surgical history when applicable.    Patient was treated with Norco and morphine, Rocephin and Levaquin    Ultrasound shows epididymal orchitis on the right.  Given the fact it is hyperechoic doubt torsion.  Discussed with Dr. Bowden.  Will treat with Rocephin here and Levaquin home.    Plan for disposition is discharged home.  Patient/family comfortable with and understanding of the plan.      Final diagnoses:   Epididymoorchitis          Marlon Marrero PA-C  12/14/23 2027

## 2024-04-11 ENCOUNTER — OFFICE VISIT (OUTPATIENT)
Dept: GASTROENTEROLOGY | Facility: CLINIC | Age: 49
End: 2024-04-11
Payer: MEDICAID

## 2024-04-11 VITALS
SYSTOLIC BLOOD PRESSURE: 115 MMHG | DIASTOLIC BLOOD PRESSURE: 87 MMHG | TEMPERATURE: 96.6 F | BODY MASS INDEX: 23.19 KG/M2 | HEIGHT: 70 IN | WEIGHT: 162 LBS | HEART RATE: 89 BPM

## 2024-04-11 DIAGNOSIS — D50.0 IRON DEFICIENCY ANEMIA DUE TO CHRONIC BLOOD LOSS: ICD-10-CM

## 2024-04-11 DIAGNOSIS — Z12.11 ENCOUNTER FOR SCREENING FOR MALIGNANT NEOPLASM OF COLON: ICD-10-CM

## 2024-04-11 DIAGNOSIS — K20.90 ESOPHAGITIS: ICD-10-CM

## 2024-04-11 DIAGNOSIS — K27.9 PUD (PEPTIC ULCER DISEASE): Primary | ICD-10-CM

## 2024-04-11 DIAGNOSIS — K59.03 DRUG INDUCED CONSTIPATION: ICD-10-CM

## 2024-04-11 RX ORDER — BISACODYL 5 MG/1
20 TABLET, DELAYED RELEASE ORAL ONCE
Qty: 4 TABLET | Refills: 0 | Status: SHIPPED | OUTPATIENT
Start: 2024-04-11 | End: 2024-04-11

## 2024-04-11 RX ORDER — SODIUM CHLORIDE 9 MG/ML
70 INJECTION, SOLUTION INTRAVENOUS CONTINUOUS PRN
OUTPATIENT
Start: 2024-04-11

## 2024-04-11 RX ORDER — SUCRALFATE 1 G/1
1 TABLET ORAL 4 TIMES DAILY
Qty: 40 TABLET | Refills: 0 | Status: SHIPPED | OUTPATIENT
Start: 2024-04-11 | End: 2024-04-21

## 2024-04-11 RX ORDER — HYDROCHLOROTHIAZIDE 12.5 MG/1
1 TABLET ORAL DAILY
COMMUNITY
Start: 2024-03-23

## 2024-04-11 RX ORDER — BUPRENORPHINE HYDROCHLORIDE AND NALOXONE HYDROCHLORIDE DIHYDRATE 8; 2 MG/1; MG/1
1.5 TABLET SUBLINGUAL DAILY
COMMUNITY
Start: 2024-04-01

## 2024-04-11 RX ORDER — ESOMEPRAZOLE MAGNESIUM 40 MG/1
40 CAPSULE, DELAYED RELEASE ORAL 2 TIMES DAILY
Qty: 28 CAPSULE | Refills: 0 | Status: SHIPPED | OUTPATIENT
Start: 2024-04-11

## 2024-04-11 RX ORDER — ONDANSETRON 4 MG/1
4 TABLET, FILM COATED ORAL EVERY 8 HOURS PRN
Qty: 30 TABLET | Refills: 1 | Status: SHIPPED | OUTPATIENT
Start: 2024-04-11

## 2024-04-11 RX ORDER — SENNOSIDES A AND B 8.6 MG/1
2 TABLET, FILM COATED ORAL NIGHTLY
Qty: 60 TABLET | Refills: 5 | Status: SHIPPED | OUTPATIENT
Start: 2024-04-11

## 2024-04-11 NOTE — H&P (VIEW-ONLY)
Follow Up Note     Date: 2024   Patient Name: Americo Davis Jr.  MRN: [id number]  : 1975     Referring Physician: Tigist Wheatley APRN    Chief Complaint:    Chief Complaint   Patient presents with    Gastric Ulcer    Peptic Ulcer Disease    Heartburn    Vomiting     Seen at ER recently         Interval History:   2024  Americo Davis Jr. is a 48 y.o. male who is here today for follow up for his ongoing abdominal pain nausea vomiting.  He states that he continues to have a worsening abdominal pain mostly in the upper abdomen and the left side abdomen with nausea and vomiting.  He could not stop his NSAIDs he is still taking ibuprofen Aleve and Excedrin as needed for his migraine headache.  He continues to smoke.  Continues to drink excessive pops.  He did not change any of his diet.  States that he had to come to emergency room in December and Protonix given is not working for him.    2021  He had an EGD at [facility] in [place] in 2021 by Dr. Ricks and was told he had a bleeding ulcer (not sure of exact location). He was given a blood transfusion at that time and was placed on protonix 40 mg BID but insurance did not cover protonix. He has been taking prilosec 20 mg once daily otc. He has not been having heartburn unless he eats spicy foods. He denies any current abdominal pain. No melena. No rectal bleeding. Normal bowel movements brown in color, occurring every 2-3 days. Reports severe fatigue and relates this to his lung disease, still smoking and uses oxygen at night. He had labs last a couple of weeks ago, not sure of those results. Not currently taking any iron supplement.      Had colonoscopy last year in , had 1 colon polyp removed. He was told to have another colonoscopy in 5 years. He cannot recall the provider's name who completed this. There is no known family history of colon cancer or colon polyps.    Subjective      Past Medical History:   Past Medical  "History:   Diagnosis Date    Anemia     Anxiety     Asthma     Back pain     Bleeding ulcer     Body piercing 10/06/2021    ears    CHF (congestive heart failure)     Reported by patient's wife    Chronic venous insufficiency     Colitis     Constipation     COPD (chronic obstructive pulmonary disease)     [place] or callus     Coronary artery disease     Depression     GERD (gastroesophageal reflux disease)     GI bleed     Gout     Headache     History of ASCVD (atherosclerotic cardiovascular disease)     History of blood transfusion     Spouse reported no reaction to transfuson     History of fracture 10/06/2021    Hx right pinky fracture - did require surgery    History of heart attack     Spouse reported \"2 light heart attacks at age 30\"     History of stomach ulcers     Hyperlipidemia     Hypertension     Migraine headache     Mild aortic valve sclerosis     Mild mitral insufficiency     Spouse reported MVP and that Dr Preciado said not sever enough to warrant surgery    On home oxygen therapy     Spouse reported at 2L/min NC prn    Poor historian 10/06/2021    Snores     Tattoos     Vision problems     Weight loss     Recent Weight Loss Involuntary For Over A Year Or More     Past Surgical History:   Past Surgical History:   Procedure Laterality Date    APPENDECTOMY      COLONOSCOPY      ENDOSCOPY      ENDOSCOPY N/A 11/5/2021    Procedure: ESOPHAGOGASTRODUODENOSCOPY WITH BIOPSY;  Surgeon: Leonor Graff MD;  Location: [facility] ENDOSCOPY;  Service: Gastroenterology;  Laterality: N/A;    ENDOSCOPY N/A 1/19/2023    Procedure: ESOPHAGOGASTRODUODENOSCOPY WITH BIOPSIES;  Surgeon: Leonor Graff MD;  Location: [facility] ENDOSCOPY;  Service: Gastroenterology;  Laterality: N/A;    FRACTURE SURGERY Right     Pinky finger    WISDOM TOOTH EXTRACTION         Family History:   Family History   Problem Relation Age of Onset    Arthritis Mother     Thyroid disease Mother     Hypertension Mother     Migraines Sister     " Thyroid disease Sister     Hypertension Sister     Mental illness Brother     Cancer Paternal Uncle     Arthritis Maternal Grandmother     Stroke Maternal Grandmother     Colon cancer Neg Hx     Cirrhosis Neg Hx     Liver cancer Neg Hx     Liver disease Neg Hx        Social History:   Social History     Socioeconomic History    Marital status:    Tobacco Use    Smoking status: Every Day     Current packs/day: 0.50     Average packs/day: 0.5 packs/day for 33.3 years (16.6 ttl pk-yrs)     Types: Cigarettes     Start date: 1991     Passive exposure: Current    Smokeless tobacco: Current     Types: Snuff   Vaping Use    Vaping status: Some Days    Substances: Nicotine (Spouse reported no use in 2 years), Flavoring    Devices: Disposable   Substance and Sexual Activity    Alcohol use: Not Currently     Comment: social    Drug use: Not Currently     Comment: opiates, snort    Sexual activity: Defer       Medications:     Current Outpatient Medications:     buprenorphine-naloxone (SUBOXONE) 8-2 MG per SL tablet, Place 1.5 tablets under the tongue Daily., Disp: , Rfl:     hydroCHLOROthiazide 12.5 MG tablet, Take 1 tablet by mouth Daily., Disp: , Rfl:     hydrOXYzine (ATARAX) 50 MG tablet, TAKE 1 TABLET BY MOUTH THREE TIMES DAILY AS NEEDED FOR ANXIETY OR SLEEP, Disp: 90 tablet, Rfl: 0    losartan (COZAAR) 50 MG tablet, , Disp: , Rfl:     metoprolol succinate XL (TOPROL-XL) 25 MG 24 hr tablet, Take 1 tablet by mouth Daily., Disp: , Rfl:     naloxone (NARCAN) 4 MG/0.1ML nasal spray, 1 spray into the nostril(s) as directed by provider As Needed (.). use for oversedation and call 911, Disp: 1 each, Rfl: 2    pantoprazole (PROTONIX) 40 MG EC tablet, Take 1 tablet by mouth Daily., Disp: 90 tablet, Rfl: 0    cyclobenzaprine (FLEXERIL) 10 MG tablet, , Disp: , Rfl:     PARoxetine (PAXIL) 20 MG tablet, Take 1 tablet by mouth Daily. (Patient not taking: Reported on 4/11/2024), Disp: 30 tablet, Rfl: 0    Allergies:   Allergies  "  Allergen Reactions    Sulfa Antibiotics Other (See Comments)     \"causes his insides to burn and his skin gets really red\" reported by patient's spouse        Review of Systems:   Review of Systems   Constitutional:  Negative for appetite change, fatigue, fever and unexpected weight loss.   HENT:  Negative for trouble swallowing.    Gastrointestinal:  Positive for abdominal pain, constipation, nausea, vomiting, GERD and indigestion. Negative for abdominal distention, anal bleeding, blood in stool, diarrhea and rectal pain.       The following portions of the patient's history were reviewed and updated as appropriate: allergies, current medications, past family history, past medical history, past social history, past surgical history and problem list.    Objective     Physical Exam:  Vital Signs:   Vitals:    04/11/24 1548   BP: 115/87   Pulse: 89   Temp: 96.6 °F (35.9 °C)   TempSrc: Infrared   Weight: 73.5 kg (162 lb)  Comment: with clothing/shoes   Height: 177.8 cm (70\")  Comment: per patient       Physical Exam  Constitutional:       Appearance: Normal appearance.   HENT:      Head: Normocephalic and atraumatic.   Eyes:      Conjunctiva/sclera: Conjunctivae normal.   Abdominal:      General: Abdomen is flat. There is no distension.      Palpations: There is no mass.      Tenderness: There is no abdominal tenderness (Minimal tenderness in the epigastric region umbilical region the left lower quadrant). There is no guarding or rebound.      Hernia: No hernia is present.   Musculoskeletal:      Cervical back: Normal range of motion and neck supple.   Neurological:      Mental Status: He is alert.         Results Review:   I reviewed the patient's new clinical results.    No visits with results within 90 Day(s) from this visit.   Latest known visit with results is:   Admission on 12/14/2023, Discharged on 12/14/2023   Component Date Value Ref Range Status    Color, UA 12/14/2023 Yellow  Yellow, Straw Final    " Appearance, UA 12/14/2023 Cloudy (A)  Clear Final    pH, UA 12/14/2023 7.5  5.0 - 8.0 Final    Specific Gravity, UA 12/14/2023 1.015  1.005 - 1.030 Final    Glucose, UA 12/14/2023 Negative  Negative Final    Ketones, UA 12/14/2023 Negative  Negative Final    Bilirubin, UA 12/14/2023 Negative  Negative Final    Blood, UA 12/14/2023 Negative  Negative Final    Protein, UA 12/14/2023 30 mg/dL (1+) (A)  Negative Final    Leuk Esterase, UA 12/14/2023 Large (3+) (A)  Negative Final    Nitrite, UA 12/14/2023 Positive (A)  Negative Final    Urobilinogen, UA 12/14/2023 0.2 E.U./dL  0.2 - 1.0 E.U./dL Final    RBC, UA 12/14/2023 None Seen  None Seen, 0-2 /HPF Final    WBC, UA 12/14/2023 11-20 (A)  None Seen, 0-2 /HPF Final    Bacteria, UA 12/14/2023 1+ (A)  None Seen /HPF Final    Squamous Epithelial Cells, UA 12/14/2023 7-12 (A)  None Seen, 0-2 /HPF Final    Hyaline Casts, UA 12/14/2023 None Seen  None Seen /LPF Final    Methodology 12/14/2023 Manual Light Microscopy   Final    WBC 12/14/2023 13.89 (H)  3.40 - 10.80 10*3/mm3 Final    RBC 12/14/2023 3.84 (L)  4.14 - 5.80 10*6/mm3 Final    Hemoglobin 12/14/2023 11.4 (L)  13.0 - 17.7 g/dL Final    Hematocrit 12/14/2023 34.8 (L)  37.5 - 51.0 % Final    MCV 12/14/2023 90.6  79.0 - 97.0 fL Final    MCH 12/14/2023 29.7  26.6 - 33.0 pg Final    MCHC 12/14/2023 32.8  31.5 - 35.7 g/dL Final    RDW 12/14/2023 14.7  12.3 - 15.4 % Final    RDW-SD 12/14/2023 49.1  37.0 - 54.0 fl Final    MPV 12/14/2023 9.2  6.0 - 12.0 fL Final    Platelets 12/14/2023 361  140 - 450 10*3/mm3 Final    Neutrophil % 12/14/2023 82.6 (H)  42.7 - 76.0 % Final    Lymphocyte % 12/14/2023 11.4 (L)  19.6 - 45.3 % Final    Monocyte % 12/14/2023 4.7 (L)  5.0 - 12.0 % Final    Eosinophil % 12/14/2023 0.2 (L)  0.3 - 6.2 % Final    Basophil % 12/14/2023 0.1  0.0 - 1.5 % Final    Immature Grans % 12/14/2023 1.0 (H)  0.0 - 0.5 % Final    Neutrophils, Absolute 12/14/2023 11.46 (H)  1.70 - 7.00 10*3/mm3 Final    Lymphocytes,  Absolute 12/14/2023 1.59  0.70 - 3.10 10*3/mm3 Final    Monocytes, Absolute 12/14/2023 0.65  0.10 - 0.90 10*3/mm3 Final    Eosinophils, Absolute 12/14/2023 0.03  0.00 - 0.40 10*3/mm3 Final    Basophils, Absolute 12/14/2023 0.02  0.00 - 0.20 10*3/mm3 Final    Immature Grans, Absolute 12/14/2023 0.14 (H)  0.00 - 0.05 10*3/mm3 Final    nRBC 12/14/2023 0.0  0.0 - 0.2 /100 WBC Final    Glucose 12/14/2023 107 (H)  65 - 99 mg/dL Final    BUN 12/14/2023 13  6 - 20 mg/dL Final    Creatinine 12/14/2023 0.87  0.76 - 1.27 mg/dL Final    Sodium 12/14/2023 130 (L)  136 - 145 mmol/L Final    Potassium 12/14/2023 3.7  3.5 - 5.2 mmol/L Final    Chloride 12/14/2023 95 (L)  98 - 107 mmol/L Final    CO2 12/14/2023 23.6  22.0 - 29.0 mmol/L Final    Calcium 12/14/2023 9.4  8.6 - 10.5 mg/dL Final    Total Protein 12/14/2023 8.0  6.0 - 8.5 g/dL Final    Albumin 12/14/2023 3.7  3.5 - 5.2 g/dL Final    ALT (SGPT) 12/14/2023 10  1 - 41 U/L Final    AST (SGOT) 12/14/2023 12  1 - 40 U/L Final    Alkaline Phosphatase 12/14/2023 84  39 - 117 U/L Final    Total Bilirubin 12/14/2023 <0.2  0.0 - 1.2 mg/dL Final    Globulin 12/14/2023 4.3  gm/dL Final    A/G Ratio 12/14/2023 0.9  g/dL Final    BUN/Creatinine Ratio 12/14/2023 14.9  7.0 - 25.0 Final    Anion Gap 12/14/2023 11.4  5.0 - 15.0 mmol/L Final    eGFR 12/14/2023 105.8  >60.0 mL/min/1.73 Final      No radiology results for the last 90 days.      EGD dated 11/5/2021 per Dr. Graff  - Normal oropharynx.  - Z-line irregular, 40 cm from the incisors.  - LA Grade A reflux esophagitis with no bleeding.  - Esophageal small cyst was found.  - Erosive gastropathy with stigmata of recent bleeding. Biopsied.  - Atrophic, discolored and texture changed mucosa in the antrum. Biopsied.  - Multiple healed ulcer scar in the gastric body, in the incisura and in the gastric antrum.  - Normal duodenal bulb, first portion of the duodenum, second portion of the duodenum and third portion of the duodenum.  -  Anemia improved, no current bleeding  -Gastric biopsy with mild foveolar hyperplasia and PPI effect.  No H. pylori.  Gastric antrum biopsy of abnormal mucosa with reactive gastropathy, no H. pylori.     EGD dated 1/19/2023 per Dr. Graff  - Normal oropharynx.  - Z-line irregular, 40 cm from the incisors.  - [place]-colored mucosa suspicious for short-segment Mg's esophagus. Biopsied.  - Erosive gastropathy with stigmata of recent bleeding. Biopsied.  - Non-bleeding gastric ulcers with no stigmata of bleeding.  - Normal duodenal bulb, first portion of the duodenum, second portion of the duodenum and third portion of the duodenum.  - Findings suggestive of NSAID induced gastropathy, PUD  A.   ANTRUM AND BODY, BIOPSY:   Reactive gastropathy.   Negative for intestinal metaplasia or dysplasia.   No H. pylori-like organisms identified on H&E.   B.   GE JUNCTION:   Reactive gastric mucosa; negative for intestinal metaplasia, dysplasia and   malignancy.   No squamous esophageal mucosa is identified.    EGD 1/19/2024  - Normal oropharynx.   - Z-line irregular, 40 cm from the incisors.   - [place]-colored mucosa suspicious for short-segment Mg's esophagus. Biopsied.   - Erosive gastropathy with stigmata of recent bleeding. Biopsied.   - Non-bleeding gastric ulcers with no stigmata of bleeding.   - Normal duodenal bulb, first portion of the duodenum, second portion of the duodenum and third portion of the duodenum.   - Findings sugestive of NSAID induced gastropathy, PUD     DIAGNOSIS:  A. ANTRUM AND BODY, BIOPSY:  Reactive gastropathy.  Negative for intestinal metaplasia or dysplasia.  No H. pylori-like organisms identified on H&E.  B. GE JUNCTION:  Reactive gastric mucosa; negative for intestinal metaplasia, dysplasia and malignancy.  No squamous esophageal mucosa is identified.    Assessment / Plan      1.  Suspected acute gastric erosions  2.  History of gastric ulcer with hemorrhage, unspecified chronicity  3.   History of PUD (peptic ulcer disease)  4.  History of erosive esophagitis  5.  Opioid induced nausea  6.  Opioid-induced constipation  7.  Iron deficiency anemia secondary to chronic blood loss  8.  Long-term NSAID use  9.  Abnormal CT abdomen  4/11/2024  His EGD done on 01/19/2024 again showed persistent erosive gastropathy.  Patient did not change his diet.  Patient did not change his lifestyle.  He continues to smoke, continues to drink excessive pops.  Patient has a significant abdominal pain, nausea associated opioid-induced constipation.  Continue to take NSAIDs especially Excedrin, ibuprofen frequently.  He is currently on Suboxone.  Lab work on 12/14/2023 revealed a borderline low sodium of 130 chloride 95 glucose of 107 otherwise CMP was normal.  Hemoglobin was 11.4 with BBC 13,000.  CT abdomen pelvis with contrast on 11/28/2023 showed finding concerning for gastritis.  Nodular focus of soft tissue states representing possible enlarged lymph node.  That Protonix is not helping.    Unfortunately without the lifestyle changes and dietary changes and controlled reduction of opioid use patient's symptoms may not improve.  Patient has been counseled on these things again this time  Abstinence from smoking  Controlled reduction of Suboxone dose.  Will change Protonix to Nexium 40 mg p.o. daily  Short course therapy with sucralfate 1 g 4 times daily for 10 days  Will start him on senna 2 tablets p.o. nightly  Zofran 4 mg p.o. twice daily as needed  Will schedule him for EGD      10. Personal history of colonic polyps  As per patient he may have had a colonoscopy done about 3-4 years ago at [place] details unknown.  He thinks he had a polyps removed.    Will schedule him for colonoscopy along with the EGD.    - Case Request  - PEG-KCl-NaCl-NaSulf-Na Asc-C (MOVIPREP) 100 g reconstituted solution powder; Take 1,000 mL by mouth Take As Directed. Take as directed for colonoscopy prep.  Dispense: 1 each; Refill:  0        Follow Up:   No follow-ups on file.    Leonor Graff MD  [facility] [place]  4/11/2024  15:49 EDT     Please note that portions of this note may have been completed with a voice recognition program.

## 2024-04-11 NOTE — PROGRESS NOTES
Follow Up Note     Date: 2024   Patient Name: Americo Davis Jr.  MRN: [id number]  : 1975     Referring Physician: Tigist Wheatley APRN    Chief Complaint:    Chief Complaint   Patient presents with    Gastric Ulcer    Peptic Ulcer Disease    Heartburn    Vomiting     Seen at ER recently         Interval History:   2024  Americo Davis Jr. is a 48 y.o. male who is here today for follow up for his ongoing abdominal pain nausea vomiting.  He states that he continues to have a worsening abdominal pain mostly in the upper abdomen and the left side abdomen with nausea and vomiting.  He could not stop his NSAIDs he is still taking ibuprofen Aleve and Excedrin as needed for his migraine headache.  He continues to smoke.  Continues to drink excessive pops.  He did not change any of his diet.  States that he had to come to emergency room in December and Protonix given is not working for him.    2021  He had an EGD at [facility] in [place] in 2021 by Dr. Ricks and was told he had a bleeding ulcer (not sure of exact location). He was given a blood transfusion at that time and was placed on protonix 40 mg BID but insurance did not cover protonix. He has been taking prilosec 20 mg once daily otc. He has not been having heartburn unless he eats spicy foods. He denies any current abdominal pain. No melena. No rectal bleeding. Normal bowel movements brown in color, occurring every 2-3 days. Reports severe fatigue and relates this to his lung disease, still smoking and uses oxygen at night. He had labs last a couple of weeks ago, not sure of those results. Not currently taking any iron supplement.      Had colonoscopy last year in , had 1 colon polyp removed. He was told to have another colonoscopy in 5 years. He cannot recall the provider's name who completed this. There is no known family history of colon cancer or colon polyps.    Subjective      Past Medical History:   Past Medical  "History:   Diagnosis Date    Anemia     Anxiety     Asthma     Back pain     Bleeding ulcer     Body piercing 10/06/2021    ears    CHF (congestive heart failure)     Reported by patient's wife    Chronic venous insufficiency     Colitis     Constipation     COPD (chronic obstructive pulmonary disease)     [place] or callus     Coronary artery disease     Depression     GERD (gastroesophageal reflux disease)     GI bleed     Gout     Headache     History of ASCVD (atherosclerotic cardiovascular disease)     History of blood transfusion     Spouse reported no reaction to transfuson     History of fracture 10/06/2021    Hx right pinky fracture - did require surgery    History of heart attack     Spouse reported \"2 light heart attacks at age 30\"     History of stomach ulcers     Hyperlipidemia     Hypertension     Migraine headache     Mild aortic valve sclerosis     Mild mitral insufficiency     Spouse reported MVP and that Dr Preciado said not sever enough to warrant surgery    On home oxygen therapy     Spouse reported at 2L/min NC prn    Poor historian 10/06/2021    Snores     Tattoos     Vision problems     Weight loss     Recent Weight Loss Involuntary For Over A Year Or More     Past Surgical History:   Past Surgical History:   Procedure Laterality Date    APPENDECTOMY      COLONOSCOPY      ENDOSCOPY      ENDOSCOPY N/A 11/5/2021    Procedure: ESOPHAGOGASTRODUODENOSCOPY WITH BIOPSY;  Surgeon: Leonor Graff MD;  Location: [facility];  Service: Gastroenterology;  Laterality: N/A;    ENDOSCOPY N/A 1/19/2023    Procedure: ESOPHAGOGASTRODUODENOSCOPY WITH BIOPSIES;  Surgeon: Leonor Graff MD;  Location: [facility] END[facility]COPY;  Service: Gastroenterology;  Laterality: N/A;    FRACTURE SURGERY Right     Pinky finger    WISDOM TOOTH EXTRACTION         Family History:   Family History   Problem Relation Age of Onset    Arthritis Mother     Thyroid disease Mother     Hypertension Mother     Migraines Sister     " Thyroid disease Sister     Hypertension Sister     Mental illness Brother     Cancer Paternal Uncle     Arthritis Maternal Grandmother     Stroke Maternal Grandmother     Colon cancer Neg Hx     Cirrhosis Neg Hx     Liver cancer Neg Hx     Liver disease Neg Hx        Social History:   Social History     Socioeconomic History    Marital status:    Tobacco Use    Smoking status: Every Day     Current packs/day: 0.50     Average packs/day: 0.5 packs/day for 33.3 years (16.6 ttl pk-yrs)     Types: Cigarettes     Start date: 1991     Passive exposure: Current    Smokeless tobacco: Current     Types: Snuff   Vaping Use    Vaping status: Some Days    Substances: Nicotine (Spouse reported no use in 2 years), Flavoring    Devices: Disposable   Substance and Sexual Activity    Alcohol use: Not Currently     Comment: social    Drug use: Not Currently     Comment: opiates, snort    Sexual activity: Defer       Medications:     Current Outpatient Medications:     buprenorphine-naloxone (SUBOXONE) 8-2 MG per SL tablet, Place 1.5 tablets under the tongue Daily., Disp: , Rfl:     hydroCHLOROthiazide 12.5 MG tablet, Take 1 tablet by mouth Daily., Disp: , Rfl:     hydrOXYzine (ATARAX) 50 MG tablet, TAKE 1 TABLET BY MOUTH THREE TIMES DAILY AS NEEDED FOR ANXIETY OR SLEEP, Disp: 90 tablet, Rfl: 0    losartan (COZAAR) 50 MG tablet, , Disp: , Rfl:     metoprolol succinate XL (TOPROL-XL) 25 MG 24 hr tablet, Take 1 tablet by mouth Daily., Disp: , Rfl:     naloxone (NARCAN) 4 MG/0.1ML nasal spray, 1 spray into the nostril(s) as directed by provider As Needed (.). use for oversedation and call 911, Disp: 1 each, Rfl: 2    pantoprazole (PROTONIX) 40 MG EC tablet, Take 1 tablet by mouth Daily., Disp: 90 tablet, Rfl: 0    cyclobenzaprine (FLEXERIL) 10 MG tablet, , Disp: , Rfl:     PARoxetine (PAXIL) 20 MG tablet, Take 1 tablet by mouth Daily. (Patient not taking: Reported on 4/11/2024), Disp: 30 tablet, Rfl: 0    Allergies:   Allergies  "  Allergen Reactions    Sulfa Antibiotics Other (See Comments)     \"causes his insides to burn and his skin gets really red\" reported by patient's spouse        Review of Systems:   Review of Systems   Constitutional:  Negative for appetite change, fatigue, fever and unexpected weight loss.   HENT:  Negative for trouble swallowing.    Gastrointestinal:  Positive for abdominal pain, constipation, nausea, vomiting, GERD and indigestion. Negative for abdominal distention, anal bleeding, blood in stool, diarrhea and rectal pain.       The following portions of the patient's history were reviewed and updated as appropriate: allergies, current medications, past family history, past medical history, past social history, past surgical history and problem list.    Objective     Physical Exam:  Vital Signs:   Vitals:    04/11/24 1548   BP: 115/87   Pulse: 89   Temp: 96.6 °F (35.9 °C)   TempSrc: Infrared   Weight: 73.5 kg (162 lb)  Comment: with clothing/shoes   Height: 177.8 cm (70\")  Comment: per patient       Physical Exam  Constitutional:       Appearance: Normal appearance.   HENT:      Head: Normocephalic and atraumatic.   Eyes:      Conjunctiva/sclera: Conjunctivae normal.   Abdominal:      General: Abdomen is flat. There is no distension.      Palpations: There is no mass.      Tenderness: There is no abdominal tenderness (Minimal tenderness in the epigastric region umbilical region the left lower quadrant). There is no guarding or rebound.      Hernia: No hernia is present.   Musculoskeletal:      Cervical back: Normal range of motion and neck supple.   Neurological:      Mental Status: He is alert.         Results Review:   I reviewed the patient's new clinical results.    No visits with results within 90 Day(s) from this visit.   Latest known visit with results is:   Admission on 12/14/2023, Discharged on 12/14/2023   Component Date Value Ref Range Status    Color, UA 12/14/2023 Yellow  Yellow, Straw Final    " Appearance, UA 12/14/2023 Cloudy (A)  Clear Final    pH, UA 12/14/2023 7.5  5.0 - 8.0 Final    Specific Gravity, UA 12/14/2023 1.015  1.005 - 1.030 Final    Glucose, UA 12/14/2023 Negative  Negative Final    Ketones, UA 12/14/2023 Negative  Negative Final    Bilirubin, UA 12/14/2023 Negative  Negative Final    Blood, UA 12/14/2023 Negative  Negative Final    Protein, UA 12/14/2023 30 mg/dL (1+) (A)  Negative Final    Leuk Esterase, UA 12/14/2023 Large (3+) (A)  Negative Final    Nitrite, UA 12/14/2023 Positive (A)  Negative Final    Urobilinogen, UA 12/14/2023 0.2 E.U./dL  0.2 - 1.0 E.U./dL Final    RBC, UA 12/14/2023 None Seen  None Seen, 0-2 /HPF Final    WBC, UA 12/14/2023 11-20 (A)  None Seen, 0-2 /HPF Final    Bacteria, UA 12/14/2023 1+ (A)  None Seen /HPF Final    Squamous Epithelial Cells, UA 12/14/2023 7-12 (A)  None Seen, 0-2 /HPF Final    Hyaline Casts, UA 12/14/2023 None Seen  None Seen /LPF Final    Methodology 12/14/2023 Manual Light Microscopy   Final    WBC 12/14/2023 13.89 (H)  3.40 - 10.80 10*3/mm3 Final    RBC 12/14/2023 3.84 (L)  4.14 - 5.80 10*6/mm3 Final    Hemoglobin 12/14/2023 11.4 (L)  13.0 - 17.7 g/dL Final    Hematocrit 12/14/2023 34.8 (L)  37.5 - 51.0 % Final    MCV 12/14/2023 90.6  79.0 - 97.0 fL Final    MCH 12/14/2023 29.7  26.6 - 33.0 pg Final    MCHC 12/14/2023 32.8  31.5 - 35.7 g/dL Final    RDW 12/14/2023 14.7  12.3 - 15.4 % Final    RDW-SD 12/14/2023 49.1  37.0 - 54.0 fl Final    MPV 12/14/2023 9.2  6.0 - 12.0 fL Final    Platelets 12/14/2023 361  140 - 450 10*3/mm3 Final    Neutrophil % 12/14/2023 82.6 (H)  42.7 - 76.0 % Final    Lymphocyte % 12/14/2023 11.4 (L)  19.6 - 45.3 % Final    Monocyte % 12/14/2023 4.7 (L)  5.0 - 12.0 % Final    Eosinophil % 12/14/2023 0.2 (L)  0.3 - 6.2 % Final    Basophil % 12/14/2023 0.1  0.0 - 1.5 % Final    Immature Grans % 12/14/2023 1.0 (H)  0.0 - 0.5 % Final    Neutrophils, Absolute 12/14/2023 11.46 (H)  1.70 - 7.00 10*3/mm3 Final    Lymphocytes,  Absolute 12/14/2023 1.59  0.70 - 3.10 10*3/mm3 Final    Monocytes, Absolute 12/14/2023 0.65  0.10 - 0.90 10*3/mm3 Final    Eosinophils, Absolute 12/14/2023 0.03  0.00 - 0.40 10*3/mm3 Final    Basophils, Absolute 12/14/2023 0.02  0.00 - 0.20 10*3/mm3 Final    Immature Grans, Absolute 12/14/2023 0.14 (H)  0.00 - 0.05 10*3/mm3 Final    nRBC 12/14/2023 0.0  0.0 - 0.2 /100 WBC Final    Glucose 12/14/2023 107 (H)  65 - 99 mg/dL Final    BUN 12/14/2023 13  6 - 20 mg/dL Final    Creatinine 12/14/2023 0.87  0.76 - 1.27 mg/dL Final    Sodium 12/14/2023 130 (L)  136 - 145 mmol/L Final    Potassium 12/14/2023 3.7  3.5 - 5.2 mmol/L Final    Chloride 12/14/2023 95 (L)  98 - 107 mmol/L Final    CO2 12/14/2023 23.6  22.0 - 29.0 mmol/L Final    Calcium 12/14/2023 9.4  8.6 - 10.5 mg/dL Final    Total Protein 12/14/2023 8.0  6.0 - 8.5 g/dL Final    Albumin 12/14/2023 3.7  3.5 - 5.2 g/dL Final    ALT (SGPT) 12/14/2023 10  1 - 41 U/L Final    AST (SGOT) 12/14/2023 12  1 - 40 U/L Final    Alkaline Phosphatase 12/14/2023 84  39 - 117 U/L Final    Total Bilirubin 12/14/2023 <0.2  0.0 - 1.2 mg/dL Final    Globulin 12/14/2023 4.3  gm/dL Final    A/G Ratio 12/14/2023 0.9  g/dL Final    BUN/Creatinine Ratio 12/14/2023 14.9  7.0 - 25.0 Final    Anion Gap 12/14/2023 11.4  5.0 - 15.0 mmol/L Final    eGFR 12/14/2023 105.8  >60.0 mL/min/1.73 Final      No radiology results for the last 90 days.      EGD dated 11/5/2021 per Dr. Graff  - Normal oropharynx.  - Z-line irregular, 40 cm from the incisors.  - LA Grade A reflux esophagitis with no bleeding.  - Esophageal small cyst was found.  - Erosive gastropathy with stigmata of recent bleeding. Biopsied.  - Atrophic, discolored and texture changed mucosa in the antrum. Biopsied.  - Multiple healed ulcer scar in the gastric body, in the incisura and in the gastric antrum.  - Normal duodenal bulb, first portion of the duodenum, second portion of the duodenum and third portion of the duodenum.  -  Anemia improved, no current bleeding  -Gastric biopsy with mild foveolar hyperplasia and PPI effect.  No H. pylori.  Gastric antrum biopsy of abnormal mucosa with reactive gastropathy, no H. pylori.     EGD dated 1/19/2023 per Dr. Graff  - Normal oropharynx.  - Z-line irregular, 40 cm from the incisors.  - [place]-colored mucosa suspicious for short-segment Mg's esophagus. Biopsied.  - Erosive gastropathy with stigmata of recent bleeding. Biopsied.  - Non-bleeding gastric ulcers with no stigmata of bleeding.  - Normal duodenal bulb, first portion of the duodenum, second portion of the duodenum and third portion of the duodenum.  - Findings suggestive of NSAID induced gastropathy, PUD  A.   ANTRUM AND BODY, BIOPSY:   Reactive gastropathy.   Negative for intestinal metaplasia or dysplasia.   No H. pylori-like organisms identified on H&E.   B.   GE JUNCTION:   Reactive gastric mucosa; negative for intestinal metaplasia, dysplasia and   malignancy.   No squamous esophageal mucosa is identified.    EGD 1/19/2024  - Normal oropharynx.   - Z-line irregular, 40 cm from the incisors.   - [place]-colored mucosa suspicious for short-segment Mg's esophagus. Biopsied.   - Erosive gastropathy with stigmata of recent bleeding. Biopsied.   - Non-bleeding gastric ulcers with no stigmata of bleeding.   - Normal duodenal bulb, first portion of the duodenum, second portion of the duodenum and third portion of the duodenum.   - Findings sugestive of NSAID induced gastropathy, PUD     DIAGNOSIS:  A. ANTRUM AND BODY, BIOPSY:  Reactive gastropathy.  Negative for intestinal metaplasia or dysplasia.  No H. pylori-like organisms identified on H&E.  B. GE JUNCTION:  Reactive gastric mucosa; negative for intestinal metaplasia, dysplasia and malignancy.  No squamous esophageal mucosa is identified.    Assessment / Plan      1.  Suspected acute gastric erosions  2.  History of gastric ulcer with hemorrhage, unspecified chronicity  3.   History of PUD (peptic ulcer disease)  4.  History of erosive esophagitis  5.  Opioid induced nausea  6.  Opioid-induced constipation  7.  Iron deficiency anemia secondary to chronic blood loss  8.  Long-term NSAID use  9.  Abnormal CT abdomen  4/11/2024  His EGD done on 01/19/2024 again showed persistent erosive gastropathy.  Patient did not change his diet.  Patient did not change his lifestyle.  He continues to smoke, continues to drink excessive pops.  Patient has a significant abdominal pain, nausea associated opioid-induced constipation.  Continue to take NSAIDs especially Excedrin, ibuprofen frequently.  He is currently on Suboxone.  Lab work on 12/14/2023 revealed a borderline low sodium of 130 chloride 95 glucose of 107 otherwise CMP was normal.  Hemoglobin was 11.4 with BBC 13,000.  CT abdomen pelvis with contrast on 11/28/2023 showed finding concerning for gastritis.  Nodular focus of soft tissue states representing possible enlarged lymph node.  That Protonix is not helping.    Unfortunately without the lifestyle changes and dietary changes and controlled reduction of opioid use patient's symptoms may not improve.  Patient has been counseled on these things again this time  Abstinence from smoking  Controlled reduction of Suboxone dose.  Will change Protonix to Nexium 40 mg p.o. daily  Short course therapy with sucralfate 1 g 4 times daily for 10 days  Will start him on senna 2 tablets p.o. nightly  Zofran 4 mg p.o. twice daily as needed  Will schedule him for EGD      10. Personal history of colonic polyps  As per patient he may have had a colonoscopy done about 3-4 years ago at [place] details unknown.  He thinks he had a polyps removed.    Will schedule him for colonoscopy along with the EGD.    - Case Request  - PEG-KCl-NaCl-NaSulf-Na Asc-C (MOVIPREP) 100 g reconstituted solution powder; Take 1,000 mL by mouth Take As Directed. Take as directed for colonoscopy prep.  Dispense: 1 each; Refill:  0        Follow Up:   No follow-ups on file.    Leonor Graff MD  [facility]  4/11/2024  15:49 EDT     Please note that portions of this note may have been completed with a voice recognition program.

## 2024-04-12 PROBLEM — Z12.11 ENCOUNTER FOR SCREENING FOR MALIGNANT NEOPLASM OF COLON: Status: ACTIVE | Noted: 2024-04-11

## 2024-04-12 PROBLEM — K20.90 ESOPHAGITIS: Status: ACTIVE | Noted: 2024-04-11

## 2024-04-18 ENCOUNTER — APPOINTMENT (OUTPATIENT)
Dept: CT IMAGING | Facility: HOSPITAL | Age: 49
DRG: 379 | End: 2024-04-18
Payer: MEDICAID

## 2024-04-18 ENCOUNTER — HOSPITAL ENCOUNTER (INPATIENT)
Facility: HOSPITAL | Age: 49
LOS: 2 days | Discharge: HOME | DRG: 379 | End: 2024-04-20
Attending: EMERGENCY MEDICINE | Admitting: STUDENT IN AN ORGANIZED HEALTH CARE EDUCATION/TRAINING PROGRAM
Payer: MEDICAID

## 2024-04-18 DIAGNOSIS — G89.29 CHRONIC ABDOMINAL PAIN: Primary | ICD-10-CM

## 2024-04-18 DIAGNOSIS — K92.1 HEMATOCHEZIA: ICD-10-CM

## 2024-04-18 DIAGNOSIS — R11.0 NAUSEA: ICD-10-CM

## 2024-04-18 DIAGNOSIS — K27.9 PUD (PEPTIC ULCER DISEASE): ICD-10-CM

## 2024-04-18 DIAGNOSIS — R10.9 CHRONIC ABDOMINAL PAIN: Primary | ICD-10-CM

## 2024-04-18 DIAGNOSIS — R10.10 UPPER ABDOMINAL PAIN: ICD-10-CM

## 2024-04-18 DIAGNOSIS — K62.5 BRIGHT RED BLOOD PER RECTUM: ICD-10-CM

## 2024-04-18 PROBLEM — K92.2 GI BLEED: Status: ACTIVE | Noted: 2024-04-18

## 2024-04-18 PROBLEM — K92.2 GIB (GASTROINTESTINAL BLEEDING): Status: ACTIVE | Noted: 2024-04-18

## 2024-04-18 LAB
ABO GROUP BLD: NORMAL
ABO GROUP BLD: NORMAL
ALBUMIN SERPL-MCNC: 4.2 G/DL (ref 3.5–5.2)
ALBUMIN/GLOB SERPL: 1.2 G/DL
ALP SERPL-CCNC: 77 U/L (ref 39–117)
ALT SERPL W P-5'-P-CCNC: 9 U/L (ref 1–41)
AMPHET+METHAMPHET UR QL: NEGATIVE
AMPHETAMINES UR QL: NEGATIVE
ANION GAP SERPL CALCULATED.3IONS-SCNC: 15.1 MMOL/L (ref 5–15)
AST SERPL-CCNC: 12 U/L (ref 1–40)
BARBITURATES UR QL SCN: NEGATIVE
BASOPHILS # BLD AUTO: 0.03 10*3/MM3 (ref 0–0.2)
BASOPHILS NFR BLD AUTO: 0.3 % (ref 0–1.5)
BENZODIAZ UR QL SCN: NEGATIVE
BILIRUB SERPL-MCNC: <0.2 MG/DL (ref 0–1.2)
BILIRUB UR QL STRIP: NEGATIVE
BLD GP AB SCN SERPL QL: NEGATIVE
BUN SERPL-MCNC: 26 MG/DL (ref 6–20)
BUN/CREAT SERPL: 29.5 (ref 7–25)
BUPRENORPHINE SERPL-MCNC: NEGATIVE NG/ML
CALCIUM SPEC-SCNC: 10 MG/DL (ref 8.6–10.5)
CANNABINOIDS SERPL QL: NEGATIVE
CHLORIDE SERPL-SCNC: 103 MMOL/L (ref 98–107)
CLARITY UR: CLEAR
CO2 SERPL-SCNC: 21.9 MMOL/L (ref 22–29)
COCAINE UR QL: NEGATIVE
COLOR UR: YELLOW
CREAT SERPL-MCNC: 0.88 MG/DL (ref 0.76–1.27)
DEPRECATED RDW RBC AUTO: 45.3 FL (ref 37–54)
EGFRCR SERPLBLD CKD-EPI 2021: 105.4 ML/MIN/1.73
EOSINOPHIL # BLD AUTO: 0.24 10*3/MM3 (ref 0–0.4)
EOSINOPHIL NFR BLD AUTO: 2.8 % (ref 0.3–6.2)
ERYTHROCYTE [DISTWIDTH] IN BLOOD BY AUTOMATED COUNT: 14 % (ref 12.3–15.4)
FENTANYL UR-MCNC: NEGATIVE NG/ML
GLOBULIN UR ELPH-MCNC: 3.5 GM/DL
GLUCOSE SERPL-MCNC: 126 MG/DL (ref 65–99)
GLUCOSE UR STRIP-MCNC: NEGATIVE MG/DL
HCT VFR BLD AUTO: 43.7 % (ref 37.5–51)
HGB BLD-MCNC: 12.6 G/DL (ref 13–17.7)
HGB BLD-MCNC: 13.8 G/DL (ref 13–17.7)
HGB BLD-MCNC: 14.6 G/DL (ref 13–17.7)
HGB UR QL STRIP.AUTO: NEGATIVE
HOLD SPECIMEN: NORMAL
HOLD SPECIMEN: NORMAL
IMM GRANULOCYTES # BLD AUTO: 0.02 10*3/MM3 (ref 0–0.05)
IMM GRANULOCYTES NFR BLD AUTO: 0.2 % (ref 0–0.5)
KETONES UR QL STRIP: NEGATIVE
LEUKOCYTE ESTERASE UR QL STRIP.AUTO: NEGATIVE
LIPASE SERPL-CCNC: 82 U/L (ref 13–60)
LYMPHOCYTES # BLD AUTO: 2.99 10*3/MM3 (ref 0.7–3.1)
LYMPHOCYTES NFR BLD AUTO: 34.6 % (ref 19.6–45.3)
MCH RBC QN AUTO: 29.7 PG (ref 26.6–33)
MCHC RBC AUTO-ENTMCNC: 33.4 G/DL (ref 31.5–35.7)
MCV RBC AUTO: 89 FL (ref 79–97)
METHADONE UR QL SCN: NEGATIVE
MONOCYTES # BLD AUTO: 0.55 10*3/MM3 (ref 0.1–0.9)
MONOCYTES NFR BLD AUTO: 6.4 % (ref 5–12)
NEUTROPHILS NFR BLD AUTO: 4.82 10*3/MM3 (ref 1.7–7)
NEUTROPHILS NFR BLD AUTO: 55.7 % (ref 42.7–76)
NITRITE UR QL STRIP: NEGATIVE
NRBC BLD AUTO-RTO: 0 /100 WBC (ref 0–0.2)
OPIATES UR QL: POSITIVE
OXYCODONE UR QL SCN: NEGATIVE
PCP UR QL SCN: NEGATIVE
PH UR STRIP.AUTO: 6.5 [PH] (ref 5–8)
PLATELET # BLD AUTO: 249 10*3/MM3 (ref 140–450)
PMV BLD AUTO: 9.3 FL (ref 6–12)
POTASSIUM SERPL-SCNC: 4.5 MMOL/L (ref 3.5–5.2)
PROT SERPL-MCNC: 7.7 G/DL (ref 6–8.5)
PROT UR QL STRIP: NEGATIVE
RBC # BLD AUTO: 4.91 10*6/MM3 (ref 4.14–5.8)
RH BLD: POSITIVE
RH BLD: POSITIVE
SODIUM SERPL-SCNC: 140 MMOL/L (ref 136–145)
SP GR UR STRIP: 1.02 (ref 1–1.03)
T&S EXPIRATION DATE: NORMAL
TRICYCLICS UR QL SCN: NEGATIVE
UROBILINOGEN UR QL STRIP: NORMAL
WBC NRBC COR # BLD AUTO: 8.65 10*3/MM3 (ref 3.4–10.8)
WHOLE BLOOD HOLD COAG: NORMAL
WHOLE BLOOD HOLD SPECIMEN: NORMAL

## 2024-04-18 PROCEDURE — 85025 COMPLETE CBC W/AUTO DIFF WBC: CPT | Performed by: EMERGENCY MEDICINE

## 2024-04-18 PROCEDURE — 74176 CT ABD & PELVIS W/O CONTRAST: CPT

## 2024-04-18 PROCEDURE — 25810000003 SODIUM CHLORIDE 0.9 % SOLUTION: Performed by: INTERNAL MEDICINE

## 2024-04-18 PROCEDURE — 25010000002 ONDANSETRON PER 1 MG: Performed by: EMERGENCY MEDICINE

## 2024-04-18 PROCEDURE — 25010000002 MORPHINE PER 10 MG: Performed by: INTERNAL MEDICINE

## 2024-04-18 PROCEDURE — 83690 ASSAY OF LIPASE: CPT | Performed by: EMERGENCY MEDICINE

## 2024-04-18 PROCEDURE — 80307 DRUG TEST PRSMV CHEM ANLYZR: CPT | Performed by: INTERNAL MEDICINE

## 2024-04-18 PROCEDURE — 86901 BLOOD TYPING SEROLOGIC RH(D): CPT | Performed by: INTERNAL MEDICINE

## 2024-04-18 PROCEDURE — 86901 BLOOD TYPING SEROLOGIC RH(D): CPT

## 2024-04-18 PROCEDURE — 86900 BLOOD TYPING SEROLOGIC ABO: CPT

## 2024-04-18 PROCEDURE — 80053 COMPREHEN METABOLIC PANEL: CPT | Performed by: EMERGENCY MEDICINE

## 2024-04-18 PROCEDURE — 86850 RBC ANTIBODY SCREEN: CPT | Performed by: INTERNAL MEDICINE

## 2024-04-18 PROCEDURE — 86900 BLOOD TYPING SEROLOGIC ABO: CPT | Performed by: INTERNAL MEDICINE

## 2024-04-18 PROCEDURE — 81003 URINALYSIS AUTO W/O SCOPE: CPT | Performed by: EMERGENCY MEDICINE

## 2024-04-18 PROCEDURE — 96376 TX/PRO/DX INJ SAME DRUG ADON: CPT

## 2024-04-18 PROCEDURE — 25810000003 SODIUM CHLORIDE 0.9 % SOLUTION: Performed by: EMERGENCY MEDICINE

## 2024-04-18 PROCEDURE — 96361 HYDRATE IV INFUSION ADD-ON: CPT

## 2024-04-18 PROCEDURE — 99222 1ST HOSP IP/OBS MODERATE 55: CPT | Performed by: INTERNAL MEDICINE

## 2024-04-18 PROCEDURE — 96375 TX/PRO/DX INJ NEW DRUG ADDON: CPT

## 2024-04-18 PROCEDURE — 96374 THER/PROPH/DIAG INJ IV PUSH: CPT

## 2024-04-18 PROCEDURE — 99285 EMERGENCY DEPT VISIT HI MDM: CPT

## 2024-04-18 PROCEDURE — 25010000002 MORPHINE PER 10 MG: Performed by: EMERGENCY MEDICINE

## 2024-04-18 PROCEDURE — 85018 HEMOGLOBIN: CPT | Performed by: INTERNAL MEDICINE

## 2024-04-18 RX ORDER — POLYETHYLENE GLYCOL 3350 17 G/17G
17 POWDER, FOR SOLUTION ORAL DAILY PRN
Status: DISCONTINUED | OUTPATIENT
Start: 2024-04-18 | End: 2024-04-20 | Stop reason: HOSPADM

## 2024-04-18 RX ORDER — ACETAMINOPHEN 650 MG/1
650 SUPPOSITORY RECTAL EVERY 4 HOURS PRN
Status: DISCONTINUED | OUTPATIENT
Start: 2024-04-18 | End: 2024-04-20 | Stop reason: HOSPADM

## 2024-04-18 RX ORDER — SODIUM CHLORIDE 9 MG/ML
250 INJECTION, SOLUTION INTRAVENOUS CONTINUOUS
Status: DISCONTINUED | OUTPATIENT
Start: 2024-04-18 | End: 2024-04-20 | Stop reason: HOSPADM

## 2024-04-18 RX ORDER — SODIUM CHLORIDE 0.9 % (FLUSH) 0.9 %
10 SYRINGE (ML) INJECTION EVERY 12 HOURS SCHEDULED
Status: DISCONTINUED | OUTPATIENT
Start: 2024-04-18 | End: 2024-04-20 | Stop reason: HOSPADM

## 2024-04-18 RX ORDER — ACETAMINOPHEN 160 MG/5ML
650 SOLUTION ORAL EVERY 4 HOURS PRN
Status: DISCONTINUED | OUTPATIENT
Start: 2024-04-18 | End: 2024-04-20 | Stop reason: HOSPADM

## 2024-04-18 RX ORDER — SODIUM CHLORIDE 0.9 % (FLUSH) 0.9 %
10 SYRINGE (ML) INJECTION AS NEEDED
Status: DISCONTINUED | OUTPATIENT
Start: 2024-04-18 | End: 2024-04-20 | Stop reason: HOSPADM

## 2024-04-18 RX ORDER — MORPHINE SULFATE 2 MG/ML
2 INJECTION, SOLUTION INTRAMUSCULAR; INTRAVENOUS ONCE
Status: COMPLETED | OUTPATIENT
Start: 2024-04-18 | End: 2024-04-18

## 2024-04-18 RX ORDER — AMOXICILLIN 250 MG
2 CAPSULE ORAL 2 TIMES DAILY PRN
Status: DISCONTINUED | OUTPATIENT
Start: 2024-04-18 | End: 2024-04-20 | Stop reason: HOSPADM

## 2024-04-18 RX ORDER — NITROGLYCERIN 0.4 MG/1
0.4 TABLET SUBLINGUAL
Status: DISCONTINUED | OUTPATIENT
Start: 2024-04-18 | End: 2024-04-20 | Stop reason: HOSPADM

## 2024-04-18 RX ORDER — LOSARTAN POTASSIUM 50 MG/1
50 TABLET ORAL
Status: DISCONTINUED | OUTPATIENT
Start: 2024-04-18 | End: 2024-04-20 | Stop reason: HOSPADM

## 2024-04-18 RX ORDER — HYDROCHLOROTHIAZIDE 12.5 MG/1
12.5 TABLET ORAL DAILY
Status: DISCONTINUED | OUTPATIENT
Start: 2024-04-18 | End: 2024-04-20 | Stop reason: HOSPADM

## 2024-04-18 RX ORDER — PANTOPRAZOLE SODIUM 40 MG/10ML
40 INJECTION, POWDER, LYOPHILIZED, FOR SOLUTION INTRAVENOUS ONCE
Status: COMPLETED | OUTPATIENT
Start: 2024-04-18 | End: 2024-04-18

## 2024-04-18 RX ORDER — BISACODYL 5 MG/1
20 TABLET, DELAYED RELEASE ORAL ONCE
Status: COMPLETED | OUTPATIENT
Start: 2024-04-18 | End: 2024-04-18

## 2024-04-18 RX ORDER — ACETAMINOPHEN 325 MG/1
650 TABLET ORAL EVERY 4 HOURS PRN
Status: DISCONTINUED | OUTPATIENT
Start: 2024-04-18 | End: 2024-04-20 | Stop reason: HOSPADM

## 2024-04-18 RX ORDER — BUPRENORPHINE AND NALOXONE 8; 2 MG/1; MG/1
1 FILM, SOLUBLE BUCCAL; SUBLINGUAL DAILY
Status: DISCONTINUED | OUTPATIENT
Start: 2024-04-18 | End: 2024-04-20 | Stop reason: HOSPADM

## 2024-04-18 RX ORDER — SODIUM CHLORIDE 9 MG/ML
100 INJECTION, SOLUTION INTRAVENOUS CONTINUOUS
Status: DISCONTINUED | OUTPATIENT
Start: 2024-04-18 | End: 2024-04-20 | Stop reason: HOSPADM

## 2024-04-18 RX ORDER — SODIUM CHLORIDE 9 MG/ML
40 INJECTION, SOLUTION INTRAVENOUS AS NEEDED
Status: DISCONTINUED | OUTPATIENT
Start: 2024-04-18 | End: 2024-04-20 | Stop reason: HOSPADM

## 2024-04-18 RX ORDER — METOPROLOL SUCCINATE 25 MG/1
25 TABLET, EXTENDED RELEASE ORAL DAILY
Status: DISCONTINUED | OUTPATIENT
Start: 2024-04-18 | End: 2024-04-20 | Stop reason: HOSPADM

## 2024-04-18 RX ORDER — SODIUM CHLORIDE 9 MG/ML
70 INJECTION, SOLUTION INTRAVENOUS CONTINUOUS PRN
Status: CANCELLED | OUTPATIENT
Start: 2024-04-18

## 2024-04-18 RX ORDER — ONDANSETRON 2 MG/ML
4 INJECTION INTRAMUSCULAR; INTRAVENOUS EVERY 6 HOURS PRN
Status: DISCONTINUED | OUTPATIENT
Start: 2024-04-18 | End: 2024-04-20 | Stop reason: HOSPADM

## 2024-04-18 RX ORDER — BISACODYL 5 MG/1
5 TABLET, DELAYED RELEASE ORAL DAILY PRN
Status: DISCONTINUED | OUTPATIENT
Start: 2024-04-18 | End: 2024-04-20 | Stop reason: HOSPADM

## 2024-04-18 RX ORDER — NICOTINE 21 MG/24HR
1 PATCH, TRANSDERMAL 24 HOURS TRANSDERMAL
Status: DISCONTINUED | OUTPATIENT
Start: 2024-04-18 | End: 2024-04-20 | Stop reason: HOSPADM

## 2024-04-18 RX ORDER — BISACODYL 10 MG
10 SUPPOSITORY, RECTAL RECTAL DAILY PRN
Status: DISCONTINUED | OUTPATIENT
Start: 2024-04-18 | End: 2024-04-20 | Stop reason: HOSPADM

## 2024-04-18 RX ORDER — ONDANSETRON 2 MG/ML
4 INJECTION INTRAMUSCULAR; INTRAVENOUS ONCE
Status: COMPLETED | OUTPATIENT
Start: 2024-04-18 | End: 2024-04-18

## 2024-04-18 RX ORDER — CHOLECALCIFEROL (VITAMIN D3) 125 MCG
5 CAPSULE ORAL NIGHTLY PRN
Status: DISCONTINUED | OUTPATIENT
Start: 2024-04-18 | End: 2024-04-20 | Stop reason: HOSPADM

## 2024-04-18 RX ADMIN — MORPHINE SULFATE 4 MG: 4 INJECTION, SOLUTION INTRAMUSCULAR; INTRAVENOUS at 09:11

## 2024-04-18 RX ADMIN — PANTOPRAZOLE SODIUM 8 MG/HR: 40 INJECTION, POWDER, FOR SOLUTION INTRAVENOUS at 07:33

## 2024-04-18 RX ADMIN — MORPHINE SULFATE 2 MG: 2 INJECTION, SOLUTION INTRAMUSCULAR; INTRAVENOUS at 07:23

## 2024-04-18 RX ADMIN — BISACODYL 20 MG: 5 TABLET, COATED ORAL at 13:39

## 2024-04-18 RX ADMIN — HYDROCHLOROTHIAZIDE 12.5 MG: 12.5 CAPSULE ORAL at 14:01

## 2024-04-18 RX ADMIN — Medication 10 ML: at 13:40

## 2024-04-18 RX ADMIN — MORPHINE SULFATE 4 MG: 4 INJECTION, SOLUTION INTRAMUSCULAR; INTRAVENOUS at 21:21

## 2024-04-18 RX ADMIN — PANTOPRAZOLE SODIUM 8 MG/HR: 40 INJECTION, POWDER, FOR SOLUTION INTRAVENOUS at 19:16

## 2024-04-18 RX ADMIN — PANTOPRAZOLE SODIUM 8 MG/HR: 40 INJECTION, POWDER, FOR SOLUTION INTRAVENOUS at 13:38

## 2024-04-18 RX ADMIN — ACETAMINOPHEN 650 MG: 325 TABLET, FILM COATED ORAL at 17:22

## 2024-04-18 RX ADMIN — SODIUM CHLORIDE 250 ML/HR: 9 INJECTION, SOLUTION INTRAVENOUS at 07:23

## 2024-04-18 RX ADMIN — PANTOPRAZOLE SODIUM 40 MG: 40 INJECTION, POWDER, FOR SOLUTION INTRAVENOUS at 07:23

## 2024-04-18 RX ADMIN — ONDANSETRON 4 MG: 2 INJECTION INTRAMUSCULAR; INTRAVENOUS at 07:23

## 2024-04-18 RX ADMIN — LOSARTAN POTASSIUM 50 MG: 50 TABLET, FILM COATED ORAL at 14:01

## 2024-04-18 RX ADMIN — Medication 1 PATCH: at 15:07

## 2024-04-18 RX ADMIN — METOPROLOL SUCCINATE 25 MG: 25 TABLET, EXTENDED RELEASE ORAL at 13:39

## 2024-04-18 RX ADMIN — POLYETHYLENE GLYCOL 3350, SODIUM SULFATE ANHYDROUS, SODIUM BICARBONATE, SODIUM CHLORIDE, POTASSIUM CHLORIDE 2000 ML: 236; 22.74; 6.74; 5.86; 2.97 POWDER, FOR SOLUTION ORAL at 17:20

## 2024-04-18 RX ADMIN — ACETAMINOPHEN 650 MG: 325 TABLET, FILM COATED ORAL at 13:39

## 2024-04-18 RX ADMIN — SODIUM CHLORIDE 100 ML/HR: 9 INJECTION, SOLUTION INTRAVENOUS at 13:38

## 2024-04-18 RX ADMIN — BUPRENORPHINE AND NALOXONE 1 FILM: 8; 2 FILM, SOLUBLE BUCCAL; SUBLINGUAL at 13:39

## 2024-04-18 NOTE — ED PROVIDER NOTES
Subjective   History of Present Illness  This patient is an unfortunate 48-year-old gentleman with a longstanding history of abdominal complaints.  Please refer to past medical documentation including recent GI note from 4/11/2024 which I have reviewed in detail.  Patient has had several EGDs.  It sounds like he is being scheduled for another EGD and potential colonoscopy in the coming days/weeks.  He has had ongoing complaints of abdominal pain and his GI physician believes this is related to persistent alcohol, tobacco and NSAID usage.  Patient comes in today stating that he has had pain for several months worse over the last week or so.  He has seen his PCP and his GI physician as mentioned above.  He has had ongoing nausea and vomiting as well.  Patient has had some of his medications adjusted by GI.  He has a history of PUD and bleeding ulcer.  He received a transfusion in the past as well.  He denies any hemoptysis or hematemesis currently.  Denies any dark tarry stools but has had some bright red blood per rectum.  He is resting comfortably, oriented x 4 and unaccompanied.    Patient denies chest pain, shortness of breath, fevers, myalgias or headache.  He reports that he has symptoms like this quite often but that this discomfort and pain is worse than usual.    Past medical history  PUD, COPD, bleeding ulcer, dyslipidemia, hypertension, depression, migraine headaches, CAD, GERD    Family history  Thyroid disease and hypertension, mom      Review of Systems   Constitutional: Negative.  Negative for chills, fatigue, fever and unexpected weight change.   HENT:  Negative for dental problem, ear pain, hearing loss and sinus pressure.    Eyes:  Negative for pain and visual disturbance.   Respiratory:  Negative for chest tightness and shortness of breath.    Cardiovascular:  Negative for chest pain, palpitations and leg swelling.   Gastrointestinal:  Positive for abdominal pain, nausea and vomiting. Negative for  "blood in stool and diarrhea.   Genitourinary:  Negative for difficulty urinating, dysuria, frequency, hematuria and urgency.   Musculoskeletal:  Negative for myalgias, neck pain and neck stiffness.   Neurological:  Negative for seizures, syncope, speech difficulty, light-headedness and headaches.   Psychiatric/Behavioral:  Negative for confusion.    All other systems reviewed and are negative.      Past Medical History:   Diagnosis Date    Anemia     Anxiety     Asthma     Back pain     Bleeding ulcer     Body piercing 10/06/2021    ears    CHF (congestive heart failure)     Reported by patient's wife    Chronic venous insufficiency     Colitis     Constipation     COPD (chronic obstructive pulmonary disease)     [place] or callus     Coronary artery disease     Depression     GERD (gastroesophageal reflux disease)     GI bleed     Gout     Headache     History of ASCVD (atherosclerotic cardiovascular disease)     History of blood transfusion     Spouse reported no reaction to transfuson     History of fracture 10/06/2021    Hx right pinky fracture - did require surgery    History of heart attack     Spouse reported \"2 light heart attacks at age 30\"     History of stomach ulcers     Hyperlipidemia     Hypertension     Migraine headache     Mild aortic valve sclerosis     Mild mitral insufficiency     Spouse reported MVP and that Dr Preciado said not sever enough to warrant surgery    On home oxygen therapy     Spouse reported at 2L/min NC prn    Poor historian 10/06/2021    Snores     Tattoos     Vision problems     Weight loss     Recent Weight Loss Involuntary For Over A Year Or More       Allergies   Allergen Reactions    Sulfa Antibiotics Other (See Comments)     \"causes his insides to burn and his skin gets really red\" reported by patient's spouse        Past Surgical History:   Procedure Laterality Date    APPENDECTOMY      COLONOSCOPY      ENDOSCOPY      ENDOSCOPY N/A 11/5/2021    Procedure: " ESOPHAGOGASTRODUODENOSCOPY WITH BIOPSY;  Surgeon: Leonor Graff MD;  Location: [facility] ENDOSCOPY;  Service: Gastroenterology;  Laterality: N/A;    ENDOSCOPY N/A 1/19/2023    Procedure: ESOPHAGOGASTRODUODENOSCOPY WITH BIOPSIES;  Surgeon: Leonor Graff MD;  Location: [facility] ENDOSCOPY;  Service: Gastroenterology;  Laterality: N/A;    FRACTURE SURGERY Right     Pinky finger    WISDOM TOOTH EXTRACTION         Family History   Problem Relation Age of Onset    Arthritis Mother     Thyroid disease Mother     Hypertension Mother     Migraines Sister     Thyroid disease Sister     Hypertension Sister     Mental illness Brother     Cancer Paternal Uncle     Arthritis Maternal Grandmother     Stroke Maternal Grandmother     Colon cancer Neg Hx     Cirrhosis Neg Hx     Liver cancer Neg Hx     Liver disease Neg Hx        Social History     Socioeconomic History    Marital status:    Tobacco Use    Smoking status: Every Day     Current packs/day: 0.50     Average packs/day: 0.5 packs/day for 33.3 years (16.6 ttl pk-yrs)     Types: Cigarettes     Start date: 1991     Passive exposure: Current    Smokeless tobacco: Current     Types: Snuff   Vaping Use    Vaping status: Some Days    Substances: Nicotine (Spouse reported no use in 2 years), Flavoring    Devices: Disposable   Substance and Sexual Activity    Alcohol use: Not Currently     Comment: social    Drug use: Not Currently     Comment: opiates, snort    Sexual activity: Defer           Objective   Physical Exam  Vitals and nursing note reviewed.   Constitutional:       General: He is not in acute distress.     Appearance: He is well-developed. He is not toxic-appearing.   HENT:      Head: Normocephalic and atraumatic.      Jaw: No trismus.      Right Ear: External ear normal.      Left Ear: External ear normal.      Nose: Nose normal.      Mouth/Throat:      Mouth: Mucous membranes are moist. Mucous membranes are not dry. No oral lesions.       Dentition: No dental abscesses.      Pharynx: Oropharynx is clear. No posterior oropharyngeal erythema or uvula swelling.      Tonsils: No tonsillar exudate or tonsillar abscesses.   Eyes:      General:         Right eye: No discharge.         Left eye: No discharge.      Extraocular Movements: Extraocular movements intact.      Conjunctiva/sclera: Conjunctivae normal.      Right eye: Right conjunctiva is not injected.      Left eye: Left conjunctiva is not injected.      Pupils: Pupils are equal, round, and reactive to light.   Neck:      Vascular: No JVD.      Trachea: No tracheal tenderness.   Cardiovascular:      Rate and Rhythm: Normal rate and regular rhythm.      Heart sounds: Normal heart sounds.      No friction rub. No gallop.   Pulmonary:      Effort: Pulmonary effort is normal.      Breath sounds: Normal breath sounds. No wheezing or rales.   Chest:      Chest wall: No tenderness.   Abdominal:      General: Bowel sounds are normal. There is no distension.      Palpations: Abdomen is soft. Abdomen is not rigid. There is no mass or pulsatile mass.      Tenderness: There is abdominal tenderness. There is no guarding or rebound. Negative signs include McBurney's sign.      Comments: No signs of acute abdomen.  No pain at McBurney's point.  No pulsatile abdominal mass.  Mild mid abdominal discomfort only.   Musculoskeletal:         General: No tenderness or deformity. Normal range of motion.      Cervical back: Normal range of motion and neck supple. No rigidity. Normal range of motion.   Lymphadenopathy:      Cervical: No cervical adenopathy.   Skin:     General: Skin is warm and dry.      Capillary Refill: Capillary refill takes less than 2 seconds.      Findings: No erythema or rash.      Comments: No diaphoresis, lesions, nevi, petechia, purpura   Neurological:      Mental Status: He is alert and oriented to person, place, and time.      Cranial Nerves: No cranial nerve deficit.      Sensory: No sensory  deficit.      Motor: No tremor or abnormal muscle tone.      Comments: 5/5 strength bilaterally with flexion and extension of fingers, wrist, elbows, knees and hips as well as plantar and dorsiflexion of the foot.   Psychiatric:         Attention and Perception: He is attentive.         Speech: Speech normal.         Behavior: Behavior normal.         Thought Content: Thought content normal.         Judgment: Judgment normal.         Procedures           ED Course  ED Course as of 04/18/24 1531   u Apr 18, 2024   7515 I had a good conversation with the patient regarding the limitations of evaluation here and likely next steps.  Given the fact the patient was just seen by his GI physician 1 week ago and has an EGD and colonoscopy scheduled, I plan to discuss the case with  following results of the patient's labs.  At this point, the patient is not actively vomiting but fluids and Zofran have been ordered.  Will give a dose of IV Protonix as well although this has not worked incredibly well in the past, I wanted to give the patient something protective that might help.  I do not anticipate the patient will have any lab derangements in need of immediate correction.  It looks like the recommended lifestyle changes have not been started by the patient and I share his GI specialist concerned that unless these changes are enacted, the patient is going to continue to suffer symptoms.  Patient does not have an acute abdomen in any way currently.  His symptoms are chronic and have been ongoing over the last several months to years.  Will discuss the case with GI in order to ensure we have a good plan for the patient going forward.  Differential diagnosis and medical decision making have been discussed in great detail with the patient.  Certainly must consider exacerbation of chronic PUD associated with NSAID usage, alcohol and tobacco.  Must also consider perforation, bleeding ulcer, small bowel obstruction,  "pancreatitis and other etiologies.  Labs should help us to differentiate these.  I do not leave advanced imaging is necessary at this time but we may go down this path based on a change in clinical exam or following consultation.  Patient is aware of the plan and without question or complaint.  Final impression and plan following completion of workup. [EZEQUIEL]   0714 I discussed the case with Dr. Graff personally at approximately 7:12 AM Eastern time.  He will see the patient in the hospital.  I do believe he will move forward with EGD and colonoscopy and I shared this with the patient and his mom.  Will get a CT scan of the abdomen and pelvis given the patient's pain and my concern for perforation.  Have ordered Zofran, Protonix and will add Protonix drip as well.  Will give a small dose of pain medication as well. [EZEQUIEL]   0716 Vital signs are stable currently.  Patient does not have evidence of tachycardia.  Current heart rate is 80.  Exam is concerning.  CT scan of the abdomen and pelvis ordered and pending.  GI has been formally consulted. [EZEQUIEL]   0733 I reexamined the patient personally at approximately 7:30 AM Eastern time.  I found him resting comfortably and he told me he felt \"much better.\"  His mom was very happy as well.  We discussed the labs that were back at that time including the CBC.  We talked about the likely need for admission and the plan for his GI physician to complete an EGD.  Patient is planning to go over to CT scan here shortly. [EZEQUIEL]   0759 I reexamined the patient after CT scan and he is feeling much better.  I reexamined his abdomen and he is still incredibly tender.  CT images not yet available but I plan to review them as soon as they are made available. [EZEQUIEL]   0808 Lipase is 82.  CMP shows a BUN and creatinine of 26 and 0.8.  CO2 21.9.  Transaminase, alkaline phosphatase and bilirubin normal.  Urinalysis pending.  CBC unremarkable as noted. [EZEQUIEL]   0809 CT scan of the abdomen pelvis was " reviewed independently by me.  I do not see any evidence of acute disease process.  Specifically I do not see any evidence of fluid collection or free air.  Official radiologic read confirms my own independent interpretation and has been cut/pasted below for completeness.    IMPRESSION:  1. No obvious inflammatory changes or bowel wall thickening.  2. No evidence of bowel obstruction  3. Nonobstructing right renal stones     This study was performed with techniques to keep radiation doses as low  as reasonably achievable (ALARA). Individualized dose reduction  techniques using automated exposure control or adjustment of vA and/or  kV according to the patient size were employed.      This report was signed and finalized on 4/18/2024 8:00 AM by Aly Tong MD.   [EZEQUIEL]   0809 Still awaiting urinalysis which I anticipate we will get soon.  I am encouraged that the patient is feeling much better.  Given the patient's history of bright red blood per rectum, history of GI bleed and plan for GI to already evaluate the patient via EGD and colonoscopy, will bring the patient into the hospital to be evaluated by GI as previously discussed with GI.  Hospitalist appears to be Dr. Kerley.  Will bring the patient in for abdominal pain, bright red blood per rectum, history of GI bleed and PUD. [EZEQUIEL]   0818 I talked to the patient and his mom about the CT results and the plan for admission.  They were very appreciative for care.  Mom asked if I knew when exactly the procedure would take place and I told her I did not but would let the admitting and GI consultative teams update them when more information was available. [EZEQUIEL]      ED Course User Index  [EZEQUIEL] Mark Edward MD      Recent Results (from the past 24 hour(s))   Comprehensive Metabolic Panel    Collection Time: 04/18/24  6:59 AM    Specimen: Blood   Result Value Ref Range    Glucose 126 (H) 65 - 99 mg/dL    BUN 26 (H) 6 - 20 mg/dL    Creatinine 0.88 0.76 - 1.27 mg/dL     Sodium 140 136 - 145 mmol/L    Potassium 4.5 3.5 - 5.2 mmol/L    Chloride 103 98 - 107 mmol/L    CO2 21.9 (L) 22.0 - 29.0 mmol/L    Calcium 10.0 8.6 - 10.5 mg/dL    Total Protein 7.7 6.0 - 8.5 g/dL    Albumin 4.2 3.5 - 5.2 g/dL    ALT (SGPT) 9 1 - 41 U/L    AST (SGOT) 12 1 - 40 U/L    Alkaline Phosphatase 77 39 - 117 U/L    Total Bilirubin <0.2 0.0 - 1.2 mg/dL    Globulin 3.5 gm/dL    A/G Ratio 1.2 g/dL    BUN/Creatinine Ratio 29.5 (H) 7.0 - 25.0    Anion Gap 15.1 (H) 5.0 - 15.0 mmol/L    eGFR 105.4 >60.0 mL/min/1.73   Lipase    Collection Time: 04/18/24  6:59 AM    Specimen: Blood   Result Value Ref Range    Lipase 82 (H) 13 - 60 U/L   Green Top (Gel)    Collection Time: 04/18/24  6:59 AM   Result Value Ref Range    Extra Tube Hold for add-ons.    Lavender Top    Collection Time: 04/18/24  6:59 AM   Result Value Ref Range    Extra Tube hold for add-on    Gold Top - SST    Collection Time: 04/18/24  6:59 AM   Result Value Ref Range    Extra Tube Hold for add-ons.    Light Blue Top    Collection Time: 04/18/24  6:59 AM   Result Value Ref Range    Extra Tube Hold for add-ons.    CBC Auto Differential    Collection Time: 04/18/24  6:59 AM    Specimen: Blood   Result Value Ref Range    WBC 8.65 3.40 - 10.80 10*3/mm3    RBC 4.91 4.14 - 5.80 10*6/mm3    Hemoglobin 14.6 13.0 - 17.7 g/dL    Hematocrit 43.7 37.5 - 51.0 %    MCV 89.0 79.0 - 97.0 fL    MCH 29.7 26.6 - 33.0 pg    MCHC 33.4 31.5 - 35.7 g/dL    RDW 14.0 12.3 - 15.4 %    RDW-SD 45.3 37.0 - 54.0 fl    MPV 9.3 6.0 - 12.0 fL    Platelets 249 140 - 450 10*3/mm3    Neutrophil % 55.7 42.7 - 76.0 %    Lymphocyte % 34.6 19.6 - 45.3 %    Monocyte % 6.4 5.0 - 12.0 %    Eosinophil % 2.8 0.3 - 6.2 %    Basophil % 0.3 0.0 - 1.5 %    Immature Grans % 0.2 0.0 - 0.5 %    Neutrophils, Absolute 4.82 1.70 - 7.00 10*3/mm3    Lymphocytes, Absolute 2.99 0.70 - 3.10 10*3/mm3    Monocytes, Absolute 0.55 0.10 - 0.90 10*3/mm3    Eosinophils, Absolute 0.24 0.00 - 0.40 10*3/mm3    Basophils,  Absolute 0.03 0.00 - 0.20 10*3/mm3    Immature Grans, Absolute 0.02 0.00 - 0.05 10*3/mm3    nRBC 0.0 0.0 - 0.2 /100 WBC   Urinalysis With Microscopic If Indicated (No Culture) - Urine, Clean Catch    Collection Time: 04/18/24 10:39 AM    Specimen: Urine, Clean Catch   Result Value Ref Range    Color, UA Yellow Yellow, Straw    Appearance, UA Clear Clear    pH, UA 6.5 5.0 - 8.0    Specific Gravity, UA 1.019 1.005 - 1.030    Glucose, UA Negative Negative    Ketones, UA Negative Negative    Bilirubin, UA Negative Negative    Blood, UA Negative Negative    Protein, UA Negative Negative    Leuk Esterase, UA Negative Negative    Nitrite, UA Negative Negative    Urobilinogen, UA 0.2 E.U./dL 0.2 - 1.0 E.U./dL   Urine Drug Screen - Urine, Clean Catch    Collection Time: 04/18/24 10:39 AM    Specimen: Urine, Clean Catch   Result Value Ref Range    THC, Screen, Urine Negative Negative    Phencyclidine (PCP), Urine Negative Negative    Cocaine Screen, Urine Negative Negative    Methamphetamine, Ur Negative Negative    Opiate Screen Positive (A) Negative    Amphetamine Screen, Urine Negative Negative    Benzodiazepine Screen, Urine Negative Negative    Tricyclic Antidepressants Screen Negative Negative    Methadone Screen, Urine Negative Negative    Barbiturates Screen, Urine Negative Negative    Oxycodone Screen, Urine Negative Negative    Buprenorphine, Screen, Urine Negative Negative   Fentanyl, Urine - Urine, Clean Catch    Collection Time: 04/18/24 10:39 AM    Specimen: Urine, Clean Catch   Result Value Ref Range    Fentanyl, Urine Negative Negative   Hemoglobin    Collection Time: 04/18/24  2:07 PM    Specimen: Blood   Result Value Ref Range    Hemoglobin 13.8 13.0 - 17.7 g/dL   Type & Screen    Collection Time: 04/18/24  2:07 PM    Specimen: Blood   Result Value Ref Range    ABO Type O     RH type Positive     Antibody Screen Negative     T&S Expiration Date 4/21/2024 11:59:59 PM    ABO RH Specimen Verification     "Collection Time: 04/18/24  2:14 PM    Specimen: Blood   Result Value Ref Range    ABO Type O     RH type Positive      Note: In addition to lab results from this visit, the labs listed above may include labs taken at another facility or during a different encounter within the last 24 hours. Please correlate lab times with ED admission and discharge times for further clarification of the services performed during this visit.    CT Abdomen Pelvis Without Contrast   Final Result   1. No obvious inflammatory changes or bowel wall thickening.   2. No evidence of bowel obstruction   3. Nonobstructing right renal stones       This study was performed with techniques to keep radiation doses as low   as reasonably achievable (ALARA). Individualized dose reduction   techniques using automated exposure control or adjustment of vA and/or   kV according to the patient size were employed.        This report was signed and finalized on 4/18/2024 8:00 AM by Aly Tong MD.            Vitals:    04/18/24 1400 04/18/24 1401 04/18/24 1402 04/18/24 1444   BP: 159/96   139/95   BP Location:    Left arm   Patient Position:    Sitting   Pulse:    73   Resp:    16   Temp:    97.4 °F (36.3 °C)   TempSrc:    Oral   SpO2: 99% 100% 98% 98%   Weight:    70.5 kg (155 lb 6.8 oz)   Height:    177.8 cm (70\")     Medications   sodium chloride 0.9 % flush 10 mL (has no administration in time range)   sodium chloride 0.9 % infusion (0 mL/hr Intravenous Stopped 4/18/24 1037)   pantoprazole (PROTONIX) 40 mg in 100mL NS IVPB (8 mg/hr Intravenous New Bag 4/18/24 1338)   morphine injection 4 mg (4 mg Intravenous Given 4/18/24 0911)   hydroCHLOROthiazide oral 12.5 mg (12.5 mg Oral Given 4/18/24 1401)   metoprolol succinate XL (TOPROL-XL) 24 hr tablet 25 mg (25 mg Oral Given 4/18/24 1339)   losartan (COZAAR) tablet 50 mg (50 mg Oral Given 4/18/24 1401)   buprenorphine-naloxone (SUBOXONE) 8-2 MG film 1 film (1 film Sublingual Given 4/18/24 1339)   sodium " chloride 0.9 % flush 10 mL (10 mL Intravenous Given 4/18/24 1340)   sodium chloride 0.9 % flush 10 mL (has no administration in time range)   sodium chloride 0.9 % infusion 40 mL (has no administration in time range)   acetaminophen (TYLENOL) tablet 650 mg (650 mg Oral Given 4/18/24 1339)     Or   acetaminophen (TYLENOL) 160 MG/5ML oral solution 650 mg ( Oral Not Given:  See Alt 4/18/24 1339)     Or   acetaminophen (TYLENOL) suppository 650 mg ( Rectal Not Given:  See Alt 4/18/24 1339)   ondansetron (ZOFRAN) injection 4 mg (has no administration in time range)   nitroglycerin (NITROSTAT) SL tablet 0.4 mg (has no administration in time range)   sodium chloride 0.9 % infusion (100 mL/hr Intravenous New Bag 4/18/24 1338)   Potassium Replacement - Follow Nurse / BPA Driven Protocol (has no administration in time range)   Magnesium Standard Dose Replacement - Follow Nurse / BPA Driven Protocol (has no administration in time range)   Phosphorus Replacement - Follow Nurse / BPA Driven Protocol (has no administration in time range)   Calcium Replacement - Follow Nurse / BPA Driven Protocol (has no administration in time range)   sennosides-docusate (PERICOLACE) 8.6-50 MG per tablet 2 tablet (has no administration in time range)     And   polyethylene glycol (MIRALAX) packet 17 g (has no administration in time range)     And   bisacodyl (DULCOLAX) EC tablet 5 mg (has no administration in time range)     And   bisacodyl (DULCOLAX) suppository 10 mg (has no administration in time range)   melatonin tablet 5 mg (has no administration in time range)   polyethylene glycol (GoLYTELY) solution 2,000 mL (has no administration in time range)   nicotine (NICODERM CQ) 21 MG/24HR patch 1 patch (1 patch Transdermal Medication Applied 4/18/24 0593)   ondansetron (ZOFRAN) injection 4 mg (4 mg Intravenous Given 4/18/24 0723)   pantoprazole (PROTONIX) injection 40 mg (40 mg Intravenous Given 4/18/24 0723)   Morphine sulfate (PF) injection 2  mg (2 mg Intravenous Given 4/18/24 0723)   bisacodyl (DULCOLAX) EC tablet 20 mg (20 mg Oral Given 4/18/24 1339)     ECG/EMG Results (last 24 hours)       ** No results found for the last 24 hours. **          No orders to display                                      KATHY reviewed by Ashvin Kelley DO, Corbett, Jeremy J, MD       Medical Decision Making  Certainly must consider exacerbation of chronic condition/conditions.  Please consider bleeding ulcer, perforation, pancreatitis, small bowel obstruction.  Certainly could consider pneumonia pneumothorax and other lower pulmonic causes as well.  Must consider gastroenteritis, gastritis as well.  Will start by giving fluids and some medications and checking labs.  I do not believe advanced imaging warranted at this time.  Will also get GI consultation given recent evaluation and plan for further evaluation as an outpatient.    Problems Addressed:  Bright red blood per rectum: chronic illness or injury  Chronic abdominal pain: complicated acute illness or injury  PUD (peptic ulcer disease): chronic illness or injury    Amount and/or Complexity of Data Reviewed  External Data Reviewed: notes.  Labs: ordered. Decision-making details documented in ED Course.  Radiology: ordered.    Risk  Prescription drug management.  Decision regarding hospitalization.        Final diagnoses:   Chronic abdominal pain   PUD (peptic ulcer disease)   Bright red blood per rectum       ED Disposition  ED Disposition       ED Disposition   Decision to Admit    Condition   --    Comment   Level of Care: Telemetry [5]   Diagnosis: GI bleed [211003]   Admitting Physician: KERLEY, BRIAN JOSEPH [[id number]]   Attending Physician: KERLEY, BRIAN JOSEPH [[id number]]   Certification: I Certify That Inpatient Hospital Services Are Medically Necessary For Greater Than 2 Midnights                 Tigist Wheatley, DENNY  [address] [place] [address] [phone]    In 1 week      Dajuan  Leonor VIZCAINO MD  [address] [facility] [address]  [phone]    In 1 week           Medication List      No changes were made to your prescriptions during this visit.            Mark Edward MD  04/18/24 1532

## 2024-04-18 NOTE — PLAN OF CARE
Problem: Adult Inpatient Plan of Care  Goal: Plan of Care Review  Outcome: Ongoing, Progressing  Goal: Patient-Specific Goal (Individualized)  Outcome: Ongoing, Progressing  Goal: Absence of Hospital-Acquired Illness or Injury  Outcome: Ongoing, Progressing  Intervention: Identify and Manage Fall Risk  Recent Flowsheet Documentation  Taken 4/18/2024 1800 by Boo Gómez RN  Safety Promotion/Fall Prevention:   safety round/check completed   nonskid shoes/slippers when out of bed  Taken 4/18/2024 1600 by Boo Gómez RN  Safety Promotion/Fall Prevention: safety round/check completed  Taken 4/18/2024 1454 by Boo Gómez RN  Safety Promotion/Fall Prevention: safety round/check completed  Intervention: Prevent Skin Injury  Recent Flowsheet Documentation  Taken 4/18/2024 1800 by Boo Gómez RN  Body Position:   sitting up in bed   position changed independently  Taken 4/18/2024 1600 by Boo Gómez RN  Body Position:   sitting up in bed   position changed independently  Taken 4/18/2024 1454 by Boo Gómez RN  Body Position: sitting up in bed  Skin Protection: tubing/devices free from skin contact  Intervention: Prevent and Manage VTE (Venous Thromboembolism) Risk  Recent Flowsheet Documentation  Taken 4/18/2024 1800 by Boo Gómez RN  Activity Management: activity encouraged  Taken 4/18/2024 1600 by Boo Gómez RN  Activity Management: activity encouraged  Taken 4/18/2024 1454 by Boo Gómez RN  Activity Management: activity encouraged  VTE Prevention/Management:   sequential compression devices off   patient refused intervention  Range of Motion: active ROM (range of motion) encouraged  Intervention: Prevent Infection  Recent Flowsheet Documentation  Taken 4/18/2024 1600 by Boo Gómez RN  Infection Prevention:   hand hygiene promoted   personal protective equipment utilized  Goal: Optimal Comfort and Wellbeing  Outcome: Ongoing, Progressing  Intervention: Provide Person-Centered Care  Recent  Flowsheet Documentation  Taken 4/18/2024 1454 by Boo Gómez, RN  Trust Relationship/Rapport:   care explained   choices provided   emotional support provided   questions answered   empathic listening provided   questions encouraged   reassurance provided   thoughts/feelings acknowledged  Goal: Readiness for Transition of Care  Outcome: Ongoing, Progressing  Intervention: Mutually Develop Transition Plan  Recent Flowsheet Documentation  Taken 4/18/2024 1442 by Boo Gómez, RN  Equipment Currently Used at Home: oxygen   Goal Outcome Evaluation:   New ED admit. No acute changes during shift. Plan on EGD and colonoscopy tomorrow. VSS.

## 2024-04-18 NOTE — H&P
[facility]      Name:  Americo Davis Jr.   Age:  48 y.o.  Sex:  male  :  1975  MRN:  [id number]   Visit Number:  [id number]  Admission Date:  2024  Date Of Service:  24  Primary Care Physician:  Tigist Wheatley APRN    Chief Complaint:     Abdominal pain    History Of Presenting Illness:      Patient is a chronically ill 48-year-old male with history of erosive esophagitis with bleeding gastric ulcer, iron deficiency anemia, opioid abuse on Suboxone, and hypertension.  Patient presents from home with his mother for continued abdominal pain, nausea/vomiting.  Also notes bright red blood per rectum.  Patient follows with Dr. Barbara Mata.  Was seen in the office 2024 for complaints of abdominal pain with nausea/vomiting.  At the time it is noted that patient is continuing to take NSAIDs despite counseling.  Will take Aleve and ibuprofen nearly daily for chronic joint pain.  Patient again underwent counseling, changed Protonix to Nexium 40 mg daily.  He was also prescribed Carafate.  EGD was scheduled for which patient did not follow-up.  Most recent EGD performed 2024 which showed persistent erosive gastropathy.  Continues to smoke 1 pack/day.  Denies excessive alcohol use.  Upon arrival to the ER, patient afebrile hypertensive and nonhypoxic on room air.  Labs significant for BUN 26.  CBC unremarkable, hemoglobin 13.8 and hematocrit 44.  UA negative.  CT abdomen pelvis showed no obvious inflammatory changes or bowel wall thickening.  No evidence of bowel obstruction.  Does note nonobstructing right renal stones.  Gastroenterology agreed to consult recommended observation with plans for endoscopy.  Patient started on Protonix drip and hospitalist asked to admit.    Review Of Systems:    All systems were reviewed and negative except as mentioned in history of presenting illness, assessment and plan.    Past Medical History:  Patient  has a past medical history of Anemia, Anxiety, Asthma, Back pain, Bleeding ulcer, Body piercing (10/06/2021), CHF (congestive heart failure), Chronic venous insufficiency, Colitis, Constipation, COPD (chronic obstructive pulmonary disease), Corn or callus, Coronary artery disease, Depression, GERD (gastroesophageal reflux disease), GI bleed, Gout, Headache, History of ASCVD (atherosclerotic cardiovascular disease), History of blood transfusion, History of fracture (10/06/2021), History of heart attack, History of stomach ulcers, Hyperlipidemia, Hypertension, Migraine headache, Mild aortic valve sclerosis, Mild mitral insufficiency, On home oxygen therapy, Poor historian (10/06/2021), Snores, Tattoos, Vision problems, and Weight loss.    Past Surgical History: Patient  has a past surgical history that includes Appendectomy; Fracture surgery (Right); [place] tooth extraction; Colonoscopy; Esophagogastroduodenoscopy; Esophagogastroduodenoscopy (N/A, 11/5/2021); and Esophagogastroduodenoscopy (N/A, 1/19/2023).    Social History: Patient  reports that he has been smoking cigarettes. He started smoking about 33 years ago. He has a 16.6 pack-year smoking history. He has been exposed to tobacco smoke. His smokeless tobacco use includes snuff. He reports that he does not currently use alcohol. He reports that he does not currently use drugs.    Family History:  Patient's family history has been reviewed and found to be noncontributory.     Allergies:      Sulfa antibiotics    Home Medications:    Prior to Admission Medications       Prescriptions Last Dose Informant Patient Reported? Taking?    buprenorphine-naloxone (SUBOXONE) 8-2 MG per SL tablet   Yes No    Place 1.5 tablets under the tongue Daily.    cyclobenzaprine (FLEXERIL) 10 MG tablet   Yes No    Patient not taking:  Reported on 4/11/2024    esomeprazole (nexIUM) 40 MG capsule   No No    Take 1 capsule by mouth 2 (Two) Times a Day.    hydroCHLOROthiazide  "12.5 MG tablet   Yes No    Take 1 tablet by mouth Daily.    hydrOXYzine (ATARAX) 50 MG tablet   No No    TAKE 1 TABLET BY MOUTH THREE TIMES DAILY AS NEEDED FOR ANXIETY OR SLEEP    losartan (COZAAR) 50 MG tablet   Yes No    metoprolol succinate XL (TOPROL-XL) 25 MG 24 hr tablet  Spouse/Significant Other Yes No    Take 1 tablet by mouth Daily.    naloxone (NARCAN) 4 MG/0.1ML nasal spray   No No    1 spray into the nostril(s) as directed by provider As Needed (.). use for oversedation and call 911    ondansetron (Zofran) 4 MG tablet   No No    Take 1 tablet by mouth Every 8 (Eight) Hours As Needed for Nausea.    PARoxetine (PAXIL) 20 MG tablet   No No    Take 1 tablet by mouth Daily.    Patient not taking:  Reported on 4/11/2024    senna 8.6 MG tablet   No No    Take 2 tablets by mouth Every Night.    sucralfate (Carafate) 1 g tablet   No No    Take 1 tablet by mouth 4 (Four) Times a Day for 10 days. Indications: Stomach Ulcer          ED Medications:    Medications   sodium chloride 0.9 % flush 10 mL (has no administration in time range)   sodium chloride 0.9 % infusion (250 mL/hr Intravenous New Bag 4/18/24 0723)   pantoprazole (PROTONIX) 40 mg in 100mL NS IVPB (8 mg/hr Intravenous New Bag 4/18/24 0733)   morphine injection 4 mg (4 mg Intravenous Given 4/18/24 0911)   ondansetron (ZOFRAN) injection 4 mg (4 mg Intravenous Given 4/18/24 0723)   pantoprazole (PROTONIX) injection 40 mg (40 mg Intravenous Given 4/18/24 0723)   Morphine sulfate (PF) injection 2 mg (2 mg Intravenous Given 4/18/24 0723)     Vital Signs:  Temp:  [97.7 °F (36.5 °C)] 97.7 °F (36.5 °C)  Heart Rate:  [73-92] 80  Resp:  [20] 20  BP: (123-169)/() 161/99        04/18/24  0640   Weight: 72.6 kg (160 lb)     Body mass index is 22.96 kg/m².    Physical Exam:     Most recent vital Signs: /99   Pulse 80   Temp 97.7 °F (36.5 °C) (Oral)   Resp 20   Ht 177.8 cm (70\")   Wt 72.6 kg (160 lb)   SpO2 99%   BMI 22.96 kg/m²     Physical " "Exam  Vitals reviewed.   Constitutional:       General: He is not in acute distress.     Appearance: He is normal weight. He is ill-appearing.   HENT:      Head: Normocephalic and atraumatic.      Right Ear: External ear normal.      Left Ear: External ear normal.      Mouth/Throat:      Mouth: Mucous membranes are moist.      Pharynx: Oropharynx is clear.   Eyes:      Extraocular Movements: Extraocular movements intact.      Conjunctiva/sclera: Conjunctivae normal.   Cardiovascular:      Rate and Rhythm: Normal rate and regular rhythm.      Pulses: Normal pulses.      Heart sounds: Normal heart sounds.   Pulmonary:      Effort: Pulmonary effort is normal.      Breath sounds: Normal breath sounds.   Abdominal:      General: There is no distension.      Tenderness: There is abdominal tenderness. There is guarding.   Musculoskeletal:         General: Normal range of motion.      Right lower leg: No edema.      Left lower leg: No edema.   Skin:     General: Skin is warm and dry.   Neurological:      General: No focal deficit present.      Mental Status: He is alert and oriented to person, place, and time.   Psychiatric:         Behavior: Behavior normal.         Laboratory data:    I have reviewed the labs done in the emergency room.    Results from last 7 days   Lab Units 04/18/24  0659   SODIUM mmol/L 140   POTASSIUM mmol/L 4.5   CHLORIDE mmol/L 103   CO2 mmol/L 21.9*   BUN mg/dL 26*   CREATININE mg/dL 0.88   CALCIUM mg/dL 10.0   BILIRUBIN mg/dL <0.2   ALK PHOS U/L 77   ALT (SGPT) U/L 9   AST (SGOT) U/L 12   GLUCOSE mg/dL 126*     Results from last 7 days   Lab Units 04/18/24  0659   WBC 10*3/mm3 8.65   HEMOGLOBIN g/dL 14.6   HEMATOCRIT % 43.7   PLATELETS 10*3/mm3 249                     Results from last 7 days   Lab Units 04/18/24  0659   LIPASE U/L 82*               Invalid input(s): \"USDES\", \"NITRITITE\", \"BACT\", \"EP\"    Pain Management Panel  More data exists         Latest Ref Rng & Units 6/5/2023 5/15/2023 "   Pain Management Panel   Amphetamine, Urine Qual Negative Negative  Negative    Barbiturates Screen, Urine Negative Negative  Negative    Benzodiazepine Screen, Urine Negative Negative  Negative    Buprenorphine, Screen, Urine Negative Positive  Negative    Cocaine Screen, Urine Negative Negative  Negative    Methadone Screen , Urine Negative Negative  Negative    Methamphetamine, Ur Negative Negative  Negative        EKG:          Radiology:    CT Abdomen Pelvis Without Contrast    Result Date: 4/18/2024  PROCEDURE: CT ABDOMEN PELVIS WO CONTRAST-  HISTORY: GI bleed, concern for perf; R10.9-Unspecified abdominal pain; G89.29-Other chronic pain; K27.9-Peptic ulcer, site unspecified, unspecified as acute or chronic, without hemorrhage or perforation  COMPARISON: 11/28/2023  Note: Noncontrast exam is less sensitive for assessment of the bowel and solid abdominal organs  TECHNIQUE: Noncontrast exam  CT ABDOMEN: Nonobstructing right renal stones are seen within the calyces largest measuring up to 6 mm similar to prior exam. Otherwise solid organs are grossly unremarkable. The bowel shows no evidence of obstruction. There is no free air or free fluid within the abdomen. Gallbladder is normal. Mild fecal impaction of the colon is present. No gross bowel wall thickening is appreciated.  CT PELVIS: No bowel dilatation is seen. There is no free fluid. Appendix is nonvisualized. No secondary findings of appendicitis are seen. Prostate is minimally enlarged..      1. No obvious inflammatory changes or bowel wall thickening. 2. No evidence of bowel obstruction 3. Nonobstructing right renal stones  This study was performed with techniques to keep radiation doses as low as reasonably achievable (ALARA). Individualized dose reduction techniques using automated exposure control or adjustment of vA and/or kV according to the patient size were employed.  This report was signed and finalized on 4/18/2024 8:00 AM by Aly Tong MD.        Assessment:    Bright red blood per rectum, GI bleed POA  History of gastric ulcer with hemorrhage  History of erosive gastropathy  Frequent NSAID use  Iron deficiency anemia  Hypertension  CAD  Depression/anxiety  Tobacco abuse    Plan:    Will admit patient hospital for observation, anticipate less than 2 midnight stay.    GI bleed  -GI consult  -Plan for endoscopy tomorrow  -Continue Protonix drip  - against NSAID use  -Monitor H&H, no indication for PRBC transfusion  -Supportive care    Nicotine patch.  Further orders as clinical course dictates.    Risk Assessment: High  DVT Prophylaxis: SCDs  Code Status: Full  Diet: N.p.oLaw Kelley DO  04/18/24  09:46 EDT    Dictated utilizing Dragon dictation.

## 2024-04-18 NOTE — Clinical Note
Level of Care: Telemetry [5]   Diagnosis: GIB (gastrointestinal bleeding) [644104]   Stage 5: Additional Anesthesia Type: 1% lidocaine with epinephrine

## 2024-04-18 NOTE — CASE MANAGEMENT/SOCIAL WORK
Discharge Planning Assessment  [facility]     Patient Name: Americo Davis Jr.  MRN: [id number]  Today's Date: 4/18/2024    Admit Date: 4/18/2024    Plan: The patient is awake and able to answer questions.  His mother is at bedside and he consents for her to be involved in his DC planning.  He is a current patient of [facility] Wheatley and gets his medications from [facility].  He elects to enroll in Meds to Bed.  He has an oxygen concentrator at home, however he does not use it.  He is unable to recall his O2 supply company.  At the time of DC the patient plans to return to the home he shares with his mother.  Questions and concerns were addressed at the time of this conversation.  Will provide additional resources and information upon patient request.   Discharge Needs Assessment       Row Name 04/18/24 1314       Living Environment    People in Home parent(s)    Name(s) of People in Home Hermlia Ramos, Mother    Current Living Arrangements home    Duration at Residence 1 1/2 years    Potentially Unsafe Housing Conditions none    In the past 12 months has the electric, gas, oil, or water company threatened to shut off services in your home? No    Primary Care Provided by self    Provides Primary Care For no one    Family Caregiver if Needed none    Quality of Family Relationships helpful;involved;supportive    Able to Return to Prior Arrangements yes       Resource/Environmental Concerns    Resource/Environmental Concerns none    Transportation Concerns none       Transportation Needs    In the past 12 months, has lack of transportation kept you from medical appointments or from getting medications? no    In the past 12 months, has lack of transportation kept you from meetings, work, or from getting things needed for daily living? No       Food Insecurity    Within the past 12 months, you worried that your food would run out before you got the money to buy more. Never true    Within the past 12 months, the food  you bought just didn't last and you didn't have money to get more. Never true       Transition Planning    Patient/Family Anticipates Transition to home with family    Patient/Family Anticipated Services at Transition none    Transportation Anticipated family or friend will provide       Discharge Needs Assessment    Readmission Within the Last 30 Days no previous admission in last 30 days    Equipment Currently Used at Home oxygen  Patient has concentrator but doesn't use it.  Unable to recall name of company    Concerns to be Addressed denies needs/concerns at this time    Anticipated Changes Related to Illness none    Equipment Needed After Discharge none    Provided Post Acute Provider List? N/A    N/A Provider List Comment Patient plans to return home; no new DME needs    Provided Post Acute Provider Quality & Resource List? N/A    N/A Quality & Resource List Comment Patient plans to return home; no new DME needs                   Discharge Plan       Row Name 04/18/24 1316       Plan    Plan The patient is awake and able to answer questions.  His mother is at bedside and he consents for her to be involved in his DC planning.  He is a current patient of [facility] and gets his medications from [facility].  He elects to enroll in Meds to Bed.  He has an oxygen concentrator at home, however he does not use it.  He is unable to recall his O2 supply company.  At the time of DC the patient plans to return to the home he shares with his mother.  Questions and concerns were addressed at the time of this conversation.  Will provide additional resources and information upon patient request.    Patient/Family in Agreement with Plan yes    Provided Post Acute Provider List? N/A    N/A Provider List Comment Patient plans to return home; no new DME needs    Provided Post Acute Provider Quality & Resource List? N/A    N/A Quality & Resource List Comment Patient plans to return home; no new DME needs    Plan Comments  Denies needs at this time    Final Discharge Disposition Code 30 - still a patient    Final Note Plans to DC home with his mother                  Continued Care and Services - Admitted Since 4/18/2024    No active coordination exists for this encounter.          Demographic Summary       Row Name 04/18/24 0388       General Information    Admission Type observation    Arrived From emergency department    Referral Source admission list    Reason for Consult discharge planning    Preferred Language English       Contact Information    Permission Granted to Share Info With                    Functional Status       Row Name 04/18/24 1315       Functional Status    Usual Activity Tolerance good    Current Activity Tolerance fair       Physical Activity    On average, how many days per week do you engage in moderate to strenuous exercise (like a brisk walk)? 0 days    On average, how many minutes do you engage in exercise at this level? 0 min    Number of minutes of exercise per week 0       Assessment of Health Literacy    How often do you have someone help you read hospital materials? Never    How often do you have problems learning about your medical condition because of difficulty understanding written information? Never    How often do you have a problem understanding what is told to you about your medical condition? Never    How confident are you filling out medical forms by yourself? Extremely    Health Literacy Excellent       Functional Status, IADL    Medications independent    Meal Preparation independent    Housekeeping independent    Laundry independent    Shopping independent       Mental Status    General Appearance WDL WDL       Mental Status Summary    Recent Changes in Mental Status/Cognitive Functioning no changes       Employment/    Employment Status unemployed    Current or Previous Occupation not applicable                   Psychosocial       Row Name 04/18/24 3854        Values/Beliefs    Spiritual, Cultural Beliefs, Catholic Practices, Values that Affect Care no       Behavior WDL    Behavior WDL WDL       Emotion Mood WDL    Emotion/Mood/Affect WDL WDL       Speech WDL    Speech WDL WDL       Perceptual State WDL    Perceptual State WDL WDL       Thought Process WDL    Thought Process WDL WDL       Intellectual Performance WDL    Intellectual Performance WDL WDL                   Abuse/Neglect       Row Name 04/18/24 1314       Personal Safety    Feels Unsafe at Home or Work/School no    Feels Threatened by Someone no    Does Anyone Try to Keep You From Having Contact with Others or Doing Things Outside Your Home? no    Physical Signs of Abuse Present no                   Legal    No documentation.                  Substance Abuse       Row Name 04/18/24 1314       Substance Use    Substance Use Status current tobacco use    Last Tobacco Use Date 04/18/24  Patient denies smoking cessation resources                   Patient Forms    No documentation.                     Constanza Hutchins RN

## 2024-04-18 NOTE — DISCHARGE INSTRUCTIONS
Discontinue tobacco and alcohol as we discussed.    New medications as prescribed.    Call to make an appointment with your PCP and your GI physician.  Follow-up in the next week as we discussed in great detail at the bedside.    Return to the emergency department immediately for new or changing concerns.    Please review the medications you are supposed to be taking according to prior physician recommendations. I have not changed your home medications during this visit. If your discharge instructions indicate that I have changed your home medications, this is not the case, and you should disregard. If you have any questions about the medication you should be taking at home, please call your physician.

## 2024-04-18 NOTE — CONSULTS
In Patient Consult      Date of Consultation: 2024  Patient Name: Americo Davis Jr.  MRN: [id number]  : 1975     Referring provider: Mark Edward MD    Primary care provider:  Tigist Wheatley APRN    Reason for consultation: Hematochezia    History of Present Illness: This is a 48-year-old male patient with gastroesophageal reflux disease.  Esophagitis, prior history of peptic ulcer disease, chronic deficiency anemia, long-term NSAID use, COPD, hypertension, hyperlipidemia, coronary artery disease, anxiety depression, was brought in emergency room this morning with a complaints of worsening abdominal pain and an episode of bright red rectal bleeding this morning    Patient just had a morphine for his abdominal pain and is very lethargic and drowsy unable to get any information from him.  His mom is at bedside and of history obtained.  She brought him to the emergency room this morning with worsening abdominal pain and episode of bright red rectal bleed this morning and associated with nausea vomiting without any blood in the vomitus..  He does have a ongoing nausea vomiting which is chronic.  Does have a chronic abdominal pain mostly mid and upper abdomen but has worsened in the last couple of weeks.  He also had bowel movement with small amount of red blood this morning.  There was no black stools.     He had a multiple EGDs done in the last 2 to 3 years time.  Patient has been taking ibuprofen, Aleve, Excedrin despite recommended to stop.  He continues to smoke.  He continues to drink excessive pops without changing his diet.  Seen in the office 2 weeks ago for the similar symptoms.  His prior EGDs revealed erosive esophagitis and also recurrent peptic ulcer disease.  Last EGD was  In 2024 that again revealed erosive gastropathy with a healed ulcer scars.   Patient's last colonoscopy was in  and had polyps removed.  Present note.    In the emergency room today  "his lab work done revealed 26 creatinine of 0.8.  Glucose 126 otherwise rest of the CMP was normal.  Lipase was borderline elevated 82.  Hemoglobin much improved compared to 14.6 g/dL compared to to December value of 11.4.  Platelet count of 249,000.  Pelvis done without contrast this morning did not reveal any significant abnormalities except nonobstructing right renal stones    Subjective     Past Medical History:   Diagnosis Date    Anemia     Anxiety     Asthma     Back pain     Bleeding ulcer     Body piercing 10/06/2021    ears    CHF (congestive heart failure)     Reported by patient's wife    Chronic venous insufficiency     Colitis     Constipation     COPD (chronic obstructive pulmonary disease)     Leblanc or callus     Coronary artery disease     Depression     GERD (gastroesophageal reflux disease)     GI bleed     Gout     Headache     History of ASCVD (atherosclerotic cardiovascular disease)     History of blood transfusion     Spouse reported no reaction to transfuson     History of fracture 10/06/2021    Hx right pinky fracture - did require surgery    History of heart attack     Spouse reported \"2 light heart attacks at age 30\"     History of stomach ulcers     Hyperlipidemia     Hypertension     Migraine headache     Mild aortic valve sclerosis     Mild mitral insufficiency     Spouse reported MVP and that Dr Preciado said not sever enough to warrant surgery    On home oxygen therapy     Spouse reported at 2L/min NC prn    Poor historian 10/06/2021    Snores     Tattoos     Vision problems     Weight loss     Recent Weight Loss Involuntary For Over A Year Or More       Past Surgical History:   Procedure Laterality Date    APPENDECTOMY      COLONOSCOPY      ENDOSCOPY      ENDOSCOPY N/A 11/5/2021    Procedure: ESOPHAGOGASTRODUODENOSCOPY WITH BIOPSY;  Surgeon: Leonor Graff MD;  Location: [facility] ENDOSCOPY;  Service: Gastroenterology;  Laterality: N/A;    ENDOSCOPY N/A 1/19/2023    Procedure: " ESOPHAGOGASTRODUODENOSCOPY WITH BIOPSIES;  Surgeon: Leonor Graff MD;  Location: [facility] ENDOSCOPY;  Service: Gastroenterology;  Laterality: N/A;    FRACTURE SURGERY Right     Pinky finger    WISDOM TOOTH EXTRACTION         Family History   Problem Relation Age of Onset    Arthritis Mother     Thyroid disease Mother     Hypertension Mother     Migraines Sister     Thyroid disease Sister     Hypertension Sister     Mental illness Brother     Cancer Paternal Uncle     Arthritis Maternal Grandmother     Stroke Maternal Grandmother     Colon cancer Neg Hx     Cirrhosis Neg Hx     Liver cancer Neg Hx     Liver disease Neg Hx        Social History     Socioeconomic History    Marital status:    Tobacco Use    Smoking status: Every Day     Current packs/day: 0.50     Average packs/day: 0.5 packs/day for 33.3 years (16.6 ttl pk-yrs)     Types: Cigarettes     Start date: 1991     Passive exposure: Current    Smokeless tobacco: Current     Types: Snuff   Vaping Use    Vaping status: Some Days    Substances: Nicotine (Spouse reported no use in 2 years), Flavoring    Devices: Disposable   Substance and Sexual Activity    Alcohol use: Not Currently     Comment: social    Drug use: Not Currently     Comment: opiates, snort    Sexual activity: Defer         Current Facility-Administered Medications:     morphine injection 4 mg, 4 mg, Intravenous, Q4H PRN, Ashvin Kelley DO, 4 mg at 04/18/24 0911    pantoprazole (PROTONIX) 40 mg in 100mL NS IVPB, 8 mg/hr, Intravenous, Continuous, Mark Edward MD, Stopped at 04/18/24 1156    sodium chloride 0.9 % flush 10 mL, 10 mL, Intravenous, PRN, Mark Edwadr MD    sodium chloride 0.9 % infusion, 250 mL/hr, Intravenous, Continuous, Mark Edward MD, Stopped at 04/18/24 1037    Current Outpatient Medications:     buprenorphine-naloxone (SUBOXONE) 8-2 MG per SL tablet, Place 1.5 tablets under the tongue Daily., Disp: , Rfl:     cyclobenzaprine (FLEXERIL) 10 MG  "tablet, , Disp: , Rfl:     esomeprazole (nexIUM) 40 MG capsule, Take 1 capsule by mouth 2 (Two) Times a Day., Disp: 28 capsule, Rfl: 0    hydroCHLOROthiazide 12.5 MG tablet, Take 1 tablet by mouth Daily., Disp: , Rfl:     hydrOXYzine (ATARAX) 50 MG tablet, TAKE 1 TABLET BY MOUTH THREE TIMES DAILY AS NEEDED FOR ANXIETY OR SLEEP, Disp: 90 tablet, Rfl: 0    losartan (COZAAR) 50 MG tablet, , Disp: , Rfl:     metoprolol succinate XL (TOPROL-XL) 25 MG 24 hr tablet, Take 1 tablet by mouth Daily., Disp: , Rfl:     naloxone (NARCAN) 4 MG/0.1ML nasal spray, 1 spray into the nostril(s) as directed by provider As Needed (.). use for oversedation and call 911, Disp: 1 each, Rfl: 2    ondansetron (Zofran) 4 MG tablet, Take 1 tablet by mouth Every 8 (Eight) Hours As Needed for Nausea., Disp: 30 tablet, Rfl: 1    PARoxetine (PAXIL) 20 MG tablet, Take 1 tablet by mouth Daily. (Patient not taking: Reported on 4/11/2024), Disp: 30 tablet, Rfl: 0    senna 8.6 MG tablet, Take 2 tablets by mouth Every Night., Disp: 60 tablet, Rfl: 5    sucralfate (Carafate) 1 g tablet, Take 1 tablet by mouth 4 (Four) Times a Day for 10 days. Indications: Stomach Ulcer, Disp: 40 tablet, Rfl: 0    Allergies   Allergen Reactions    Sulfa Antibiotics Other (See Comments)     \"causes his insides to burn and his skin gets really red\" reported by patient's spouse        Review of Systems   Constitutional:  Negative for appetite change, fatigue, fever and unexpected weight loss.   HENT:  Negative for trouble swallowing.    Gastrointestinal:  Positive for abdominal pain, nausea and vomiting. Negative for abdominal distention, anal bleeding, blood in stool, constipation, diarrhea, rectal pain, GERD and indigestion.        The following portions of the patient's history were reviewed and updated as appropriate: allergies, current medications, past family history, past medical history, past social history, past surgical history and problem list.    Objective "     Vitals:    04/18/24 1110 04/18/24 1115 04/18/24 1130 04/18/24 1131   BP:   (!) 155/108    BP Location:       Patient Position:       Pulse: 74 73 74 74   Resp:       Temp:       TempSrc:       SpO2: 98% 98% 98% 98%   Weight:       Height:           Physical Exam  Constitutional:       Comments: Patient currently very lethargic following morphine   HENT:      Head: Normocephalic and atraumatic.   Eyes:      Conjunctiva/sclera: Conjunctivae normal.   Abdominal:      General: Abdomen is flat. There is no distension.      Palpations: There is no mass.      Tenderness: There is abdominal tenderness (Diffuse abdominal tenderness). There is no guarding or rebound.      Hernia: No hernia is present.   Musculoskeletal:      Cervical back: Normal range of motion and neck supple.         Results from last 7 days   Lab Units 04/18/24  0659   SODIUM mmol/L 140   POTASSIUM mmol/L 4.5   CHLORIDE mmol/L 103   CO2 mmol/L 21.9*   BUN mg/dL 26*   CREATININE mg/dL 0.88   CALCIUM mg/dL 10.0   ALBUMIN g/dL 4.2   BILIRUBIN mg/dL <0.2   ALK PHOS U/L 77   ALT (SGPT) U/L 9   AST (SGOT) U/L 12   GLUCOSE mg/dL 126*   WBC 10*3/mm3 8.65   HEMOGLOBIN g/dL 14.6   PLATELETS 10*3/mm3 249       Imaging Results (Last 24 Hours)       Procedure Component Value Units Date/Time    CT Abdomen Pelvis Without Contrast [[id number]] Collected: 04/18/24 0756     Updated: 04/18/24 0802    Narrative:      PROCEDURE: CT ABDOMEN PELVIS WO CONTRAST-     HISTORY: GI bleed, concern for perf; R10.9-Unspecified abdominal pain;  G89.29-Other chronic pain; K27.9-Peptic ulcer, site unspecified,  unspecified as acute or chronic, without hemorrhage or perforation     COMPARISON: 11/28/2023     Note: Noncontrast exam is less sensitive for assessment of the bowel and  solid abdominal organs     TECHNIQUE: Noncontrast exam      CT ABDOMEN: Nonobstructing right renal stones are seen within the  calyces largest measuring up to 6 mm similar to prior exam. Otherwise  solid  organs are grossly unremarkable. The bowel shows no evidence of  obstruction. There is no free air or free fluid within the abdomen.  Gallbladder is normal. Mild fecal impaction of the colon is present. No  gross bowel wall thickening is appreciated.     CT PELVIS: No bowel dilatation is seen. There is no free fluid. Appendix  is nonvisualized. No secondary findings of appendicitis are seen.  Prostate is minimally enlarged..       Impression:      1. No obvious inflammatory changes or bowel wall thickening.  2. No evidence of bowel obstruction  3. Nonobstructing right renal stones     This study was performed with techniques to keep radiation doses as low  as reasonably achievable (ALARA). Individualized dose reduction  techniques using automated exposure control or adjustment of vA and/or  kV according to the patient size were employed.      This report was signed and finalized on 4/18/2024 8:00 AM by Aly Tong MD.             EGD dated 11/5/2021 per Dr. Graff  - Normal oropharynx.  - Z-line irregular, 40 cm from the incisors.  - LA Grade A reflux esophagitis with no bleeding.  - Esophageal small cyst was found.  - Erosive gastropathy with stigmata of recent bleeding. Biopsied.  - Atrophic, discolored and texture changed mucosa in the antrum. Biopsied.  - Multiple healed ulcer scar in the gastric body, in the incisura and in the gastric antrum.  - Normal duodenal bulb, first portion of the duodenum, second portion of the duodenum and third portion of the duodenum.  - Anemia improved, no current bleeding  -Gastric biopsy with mild foveolar hyperplasia and PPI effect.  No H. pylori.  Gastric antrum biopsy of abnormal mucosa with reactive gastropathy, no H. pylori.     EGD dated 1/19/2023 per Dr. Graff  - Normal oropharynx.  - Z-line irregular, 40 cm from the incisors.  - [place]-colored mucosa suspicious for short-segment Mg's esophagus. Biopsied.  - Erosive gastropathy with stigmata of recent  bleeding. Biopsied.  - Non-bleeding gastric ulcers with no stigmata of bleeding.  - Normal duodenal bulb, first portion of the duodenum, second portion of the duodenum and third portion of the duodenum.  - Findings suggestive of NSAID induced gastropathy, PUD  A.   ANTRUM AND BODY, BIOPSY:   Reactive gastropathy.   Negative for intestinal metaplasia or dysplasia.   No H. pylori-like organisms identified on H&E.   B.   GE JUNCTION:   Reactive gastric mucosa; negative for intestinal metaplasia, dysplasia and   malignancy.   No squamous esophageal mucosa is identified.     EGD 1/19/2024  - Normal oropharynx.   - Z-line irregular, 40 cm from the incisors.   - [place]-colored mucosa suspicious for short-segment Mg's esophagus. Biopsied.   - Erosive gastropathy with stigmata of recent bleeding. Biopsied.   - Non-bleeding gastric ulcers with no stigmata of bleeding.   - Normal duodenal bulb, first portion of the duodenum, second portion of the duodenum and third portion of the duodenum.   - Findings sugestive of NSAID induced gastropathy, PUD     DIAGNOSIS:  A. ANTRUM AND BODY, BIOPSY:  Reactive gastropathy.  Negative for intestinal metaplasia or dysplasia.  No H. pylori-like organisms identified on H&E.  B. GE JUNCTION:  Reactive gastric mucosa; negative for intestinal metaplasia, dysplasia and malignancy.  No squamous esophageal mucosa is identified.    Assessment / Plan      Assessment/Recommendations:   1.  Hematochezia  2.  Suspected acute gastric erosion and peptic ulcer disease  3.  Chronic abdominal pain  4.  History of gastric ulcer with hemorrhage, unspecified chronicity  5.  History of erosive esophagitis  6.  Chronic functional nausea complicated by opioid use   7.  History of Iron deficiency anemia secondary to chronic blood loss  8.  Long-term NSAID use  9. Personal history of colonic polyps  Patient has a significant history of NSAID use especially Excedrin almost daily.  He also takes Aleve  intermittently.  He had multiple EGDs done in the past that showed erosive esophagitis or peptic ulcer disease.  He continues to smoke.  Continues to drink excessive pops without  working on lifestyle changes.  As per family he probably stopped his Suboxone recently.    His history this time is more suggestive of rectal outlet bleeding however he is extremely high risk for overt GI bleed.  History is more suggestive of acute gastric erosions peptic ulcer disease. CT abdomen pelvis with contrast on 11/28/2023 showed finding concerning for gastritis.  Nodular focus of soft tissue enhancement adjacent to the distal stomach representing possible enlarged lymph node.     Clinical examination today reveals a significant diffuse abdominal pain and tenderness.  CT abdomen full is done without contrast was unremarkable.  As the Protonix was not helping him that was changed to Nexium.  Not sure whether he feels the Nexium.  He had a course of sucralfate recently    He has been due for EGD colonoscopy as OP.  Will schedule that for tomorrow in AM.  Will keep him n.p.o. after midnight  Okay to have a clear liquids  GoLytely bowel prep this evening  Protonix IV twice daily  Will recommend further after EGD colonoscopy  Abstinence from smoking  Complete abstinence from NSAID use       Thank you very much for letting me participate in the care of this patient.  Please do not hesitate to call me if you have any questions.    Leonor Graff MD  [facility] [place]  4/18/2024  12:22 EDT    Please note that portions of this note may have been completed with a voice recognition program.

## 2024-04-19 ENCOUNTER — ANESTHESIA EVENT (OUTPATIENT)
Dept: GASTROENTEROLOGY | Facility: HOSPITAL | Age: 49
End: 2024-04-19
Payer: MEDICAID

## 2024-04-19 ENCOUNTER — ANESTHESIA (OUTPATIENT)
Dept: GASTROENTEROLOGY | Facility: HOSPITAL | Age: 49
End: 2024-04-19
Payer: MEDICAID

## 2024-04-19 LAB
ANION GAP SERPL CALCULATED.3IONS-SCNC: 9.8 MMOL/L (ref 5–15)
BASOPHILS # BLD AUTO: 0.04 10*3/MM3 (ref 0–0.2)
BASOPHILS NFR BLD AUTO: 0.5 % (ref 0–1.5)
BUN SERPL-MCNC: 15 MG/DL (ref 6–20)
BUN/CREAT SERPL: 19.5 (ref 7–25)
CALCIUM SPEC-SCNC: 8.8 MG/DL (ref 8.6–10.5)
CHLORIDE SERPL-SCNC: 103 MMOL/L (ref 98–107)
CO2 SERPL-SCNC: 23.2 MMOL/L (ref 22–29)
CREAT SERPL-MCNC: 0.77 MG/DL (ref 0.76–1.27)
DEPRECATED RDW RBC AUTO: 47.2 FL (ref 37–54)
EGFRCR SERPLBLD CKD-EPI 2021: 109.7 ML/MIN/1.73
EOSINOPHIL # BLD AUTO: 0.17 10*3/MM3 (ref 0–0.4)
EOSINOPHIL NFR BLD AUTO: 2.1 % (ref 0.3–6.2)
ERYTHROCYTE [DISTWIDTH] IN BLOOD BY AUTOMATED COUNT: 14.2 % (ref 12.3–15.4)
GLUCOSE SERPL-MCNC: 88 MG/DL (ref 65–99)
HCT VFR BLD AUTO: 37.4 % (ref 37.5–51)
HGB BLD-MCNC: 11.6 G/DL (ref 13–17.7)
HGB BLD-MCNC: 12.2 G/DL (ref 13–17.7)
HGB BLD-MCNC: 12.2 G/DL (ref 13–17.7)
IMM GRANULOCYTES # BLD AUTO: 0.02 10*3/MM3 (ref 0–0.05)
IMM GRANULOCYTES NFR BLD AUTO: 0.2 % (ref 0–0.5)
LYMPHOCYTES # BLD AUTO: 2.43 10*3/MM3 (ref 0.7–3.1)
LYMPHOCYTES NFR BLD AUTO: 29.4 % (ref 19.6–45.3)
MCH RBC QN AUTO: 29.5 PG (ref 26.6–33)
MCHC RBC AUTO-ENTMCNC: 32.6 G/DL (ref 31.5–35.7)
MCV RBC AUTO: 90.3 FL (ref 79–97)
MONOCYTES # BLD AUTO: 0.5 10*3/MM3 (ref 0.1–0.9)
MONOCYTES NFR BLD AUTO: 6.1 % (ref 5–12)
NEUTROPHILS NFR BLD AUTO: 5.1 10*3/MM3 (ref 1.7–7)
NEUTROPHILS NFR BLD AUTO: 61.7 % (ref 42.7–76)
NRBC BLD AUTO-RTO: 0 /100 WBC (ref 0–0.2)
PLATELET # BLD AUTO: 221 10*3/MM3 (ref 140–450)
PMV BLD AUTO: 9.5 FL (ref 6–12)
POTASSIUM SERPL-SCNC: 3.8 MMOL/L (ref 3.5–5.2)
RBC # BLD AUTO: 4.14 10*6/MM3 (ref 4.14–5.8)
SODIUM SERPL-SCNC: 136 MMOL/L (ref 136–145)
WBC NRBC COR # BLD AUTO: 8.26 10*3/MM3 (ref 3.4–10.8)

## 2024-04-19 PROCEDURE — 43239 EGD BIOPSY SINGLE/MULTIPLE: CPT | Performed by: INTERNAL MEDICINE

## 2024-04-19 PROCEDURE — 96376 TX/PRO/DX INJ SAME DRUG ADON: CPT

## 2024-04-19 PROCEDURE — 25010000002 PROPOFOL 10 MG/ML EMULSION: Performed by: NURSE ANESTHETIST, CERTIFIED REGISTERED

## 2024-04-19 PROCEDURE — 25810000003 SODIUM CHLORIDE 0.9 % SOLUTION: Performed by: NURSE ANESTHETIST, CERTIFIED REGISTERED

## 2024-04-19 PROCEDURE — 25810000003 SODIUM CHLORIDE 0.9 % SOLUTION: Performed by: INTERNAL MEDICINE

## 2024-04-19 PROCEDURE — 80048 BASIC METABOLIC PNL TOTAL CA: CPT | Performed by: INTERNAL MEDICINE

## 2024-04-19 PROCEDURE — 25010000002 ONDANSETRON PER 1 MG: Performed by: INTERNAL MEDICINE

## 2024-04-19 PROCEDURE — 0DB68ZX EXCISION OF STOMACH, VIA NATURAL OR ARTIFICIAL OPENING ENDOSCOPIC, DIAGNOSTIC: ICD-10-PCS | Performed by: INTERNAL MEDICINE

## 2024-04-19 PROCEDURE — 85018 HEMOGLOBIN: CPT | Performed by: INTERNAL MEDICINE

## 2024-04-19 PROCEDURE — 85025 COMPLETE CBC W/AUTO DIFF WBC: CPT | Performed by: INTERNAL MEDICINE

## 2024-04-19 PROCEDURE — 25010000002 MORPHINE PER 10 MG: Performed by: INTERNAL MEDICINE

## 2024-04-19 PROCEDURE — 99232 SBSQ HOSP IP/OBS MODERATE 35: CPT | Performed by: INTERNAL MEDICINE

## 2024-04-19 RX ORDER — PANTOPRAZOLE SODIUM 40 MG/1
40 TABLET, DELAYED RELEASE ORAL 2 TIMES DAILY
Qty: 60 TABLET | Refills: 0 | Status: SHIPPED | OUTPATIENT
Start: 2024-04-19 | End: 2024-05-19

## 2024-04-19 RX ORDER — BISACODYL 5 MG/1
20 TABLET, DELAYED RELEASE ORAL ONCE
Status: COMPLETED | OUTPATIENT
Start: 2024-04-19 | End: 2024-04-19

## 2024-04-19 RX ORDER — NICOTINE 21 MG/24HR
1 PATCH, TRANSDERMAL 24 HOURS TRANSDERMAL
Qty: 14 EACH | Refills: 0 | Status: SHIPPED | OUTPATIENT
Start: 2024-04-19

## 2024-04-19 RX ORDER — ONDANSETRON 2 MG/ML
4 INJECTION INTRAMUSCULAR; INTRAVENOUS ONCE AS NEEDED
Status: ACTIVE | OUTPATIENT
Start: 2024-04-19 | End: 2024-04-19

## 2024-04-19 RX ORDER — PROPOFOL 10 MG/ML
VIAL (ML) INTRAVENOUS AS NEEDED
Status: DISCONTINUED | OUTPATIENT
Start: 2024-04-19 | End: 2024-04-19 | Stop reason: SURG

## 2024-04-19 RX ORDER — KETAMINE HCL IN NACL, ISO-OSM 100MG/10ML
SYRINGE (ML) INJECTION AS NEEDED
Status: DISCONTINUED | OUTPATIENT
Start: 2024-04-19 | End: 2024-04-19 | Stop reason: SURG

## 2024-04-19 RX ORDER — SODIUM CHLORIDE 9 MG/ML
INJECTION, SOLUTION INTRAVENOUS CONTINUOUS PRN
Status: DISCONTINUED | OUTPATIENT
Start: 2024-04-19 | End: 2024-04-19 | Stop reason: SURG

## 2024-04-19 RX ADMIN — PANTOPRAZOLE SODIUM 8 MG/HR: 40 INJECTION, POWDER, FOR SOLUTION INTRAVENOUS at 16:39

## 2024-04-19 RX ADMIN — BUPRENORPHINE AND NALOXONE 1 FILM: 8; 2 FILM, SOLUBLE BUCCAL; SUBLINGUAL at 15:32

## 2024-04-19 RX ADMIN — PROPOFOL 400 MG: 10 INJECTION, EMULSION INTRAVENOUS at 13:32

## 2024-04-19 RX ADMIN — LOSARTAN POTASSIUM 50 MG: 50 TABLET, FILM COATED ORAL at 15:31

## 2024-04-19 RX ADMIN — SODIUM CHLORIDE 100 ML/HR: 9 INJECTION, SOLUTION INTRAVENOUS at 12:22

## 2024-04-19 RX ADMIN — BISACODYL 20 MG: 5 TABLET, COATED ORAL at 08:43

## 2024-04-19 RX ADMIN — ONDANSETRON 4 MG: 2 INJECTION INTRAMUSCULAR; INTRAVENOUS at 11:02

## 2024-04-19 RX ADMIN — Medication 50 MG: at 13:32

## 2024-04-19 RX ADMIN — METOPROLOL SUCCINATE 25 MG: 25 TABLET, EXTENDED RELEASE ORAL at 15:31

## 2024-04-19 RX ADMIN — MORPHINE SULFATE 4 MG: 4 INJECTION, SOLUTION INTRAMUSCULAR; INTRAVENOUS at 09:51

## 2024-04-19 RX ADMIN — LIDOCAINE HYDROCHLORIDE 100 MG: 20 INJECTION, SOLUTION INTRAVENOUS at 13:32

## 2024-04-19 RX ADMIN — SODIUM CHLORIDE: 9 INJECTION, SOLUTION INTRAVENOUS at 13:25

## 2024-04-19 RX ADMIN — Medication 1 PATCH: at 15:30

## 2024-04-19 RX ADMIN — POLYETHYLENE GLYCOL 3350, SODIUM SULFATE ANHYDROUS, SODIUM BICARBONATE, SODIUM CHLORIDE, POTASSIUM CHLORIDE 2000 ML: 236; 22.74; 6.74; 5.86; 2.97 POWDER, FOR SOLUTION ORAL at 05:59

## 2024-04-19 RX ADMIN — PANTOPRAZOLE SODIUM 8 MG/HR: 40 INJECTION, POWDER, FOR SOLUTION INTRAVENOUS at 08:48

## 2024-04-19 RX ADMIN — MORPHINE SULFATE 4 MG: 4 INJECTION, SOLUTION INTRAMUSCULAR; INTRAVENOUS at 22:40

## 2024-04-19 NOTE — ANESTHESIA PREPROCEDURE EVALUATION
Anesthesia Evaluation     Patient summary reviewed and Nursing notes reviewed   NPO Solid Status: > 8 hours  NPO Liquid Status: > 8 hours           Airway   Mallampati: II  TM distance: >3 FB  Neck ROM: full  Possible difficult intubation  Dental - normal exam     Pulmonary - normal exam   (+) a smoker Current, Smoked day of surgery, COPD moderate, asthma,  Cardiovascular - normal exam    (+) hypertension well controlled 2 medications or greater, valvular problems/murmurs MVP, CAD, CHF , hyperlipidemia      Neuro/Psych  (+) headaches, psychiatric history Anxiety and Depression  GI/Hepatic/Renal/Endo    (+) GERD well controlled, PUD, GI bleeding upper     Musculoskeletal     (+) back pain  Abdominal  - normal exam   Substance History - negative use     OB/GYN          Other - negative ROS                     Anesthesia Plan    ASA 3     MAC     (Pt told that intravenous sedation will be used as the primary anesthetic along with local anesthesia if necessary. Every effort will be made to make sure the patient is comfortable.     The patient was told they may or may not have recall for the procedure. It was further explained that if the MAC was not adequate that a general anesthetic with either an LMA or endotracheal tube would be required.     Will proceed with the plan of care.)  intravenous induction     Anesthetic plan, risks, benefits, and alternatives have been provided, discussed and informed consent has been obtained with: patient.    Plan discussed with CRNA.    
rectal

## 2024-04-19 NOTE — CASE MANAGEMENT/SOCIAL WORK
EGD pending today. DCP home when medically stable. Pt independent, no new needs at this time. CM continues to follow.

## 2024-04-19 NOTE — PLAN OF CARE
Goal Outcome Evaluation:  Plan of Care Reviewed With: patient        Problem: Adult Inpatient Plan of Care  Goal: Plan of Care Review  Outcome: Ongoing, Progressing  Flowsheets (Taken 4/19/2024 1631)  Plan of Care Reviewed With: patient  Goal: Patient-Specific Goal (Individualized)  Outcome: Ongoing, Progressing  Goal: Absence of Hospital-Acquired Illness or Injury  Outcome: Ongoing, Progressing  Intervention: Identify and Manage Fall Risk  Recent Flowsheet Documentation  Taken 4/19/2024 1617 by Yina Boss RN  Safety Promotion/Fall Prevention:   activity supervised   assistive device/personal items within reach   clutter free environment maintained   safety round/check completed   toileting scheduled   room organization consistent   nonskid shoes/slippers when out of bed   fall prevention program maintained   lighting adjusted  Taken 4/19/2024 1400 by Yina Boss RN  Safety Promotion/Fall Prevention:   activity supervised   assistive device/personal items within reach   clutter free environment maintained   toileting scheduled   safety round/check completed   room organization consistent   lighting adjusted   nonskid shoes/slippers when out of bed  Taken 4/19/2024 1200 by Yina Boss RN  Safety Promotion/Fall Prevention:   activity supervised   assistive device/personal items within reach   clutter free environment maintained   room organization consistent   nonskid shoes/slippers when out of bed   safety round/check completed   toileting scheduled   fall prevention program maintained   lighting adjusted  Taken 4/19/2024 1000 by Yina Boss RN  Safety Promotion/Fall Prevention:   activity supervised   assistive device/personal items within reach   clutter free environment maintained   toileting scheduled   safety round/check completed   room organization consistent   fall prevention program maintained   lighting adjusted   nonskid shoes/slippers when out of bed  Taken 4/19/2024 0800 by Yina Boss  RN  Safety Promotion/Fall Prevention:   activity supervised   assistive device/personal items within reach   clutter free environment maintained   toileting scheduled   safety round/check completed   room organization consistent   fall prevention program maintained   lighting adjusted   nonskid shoes/slippers when out of bed  Intervention: Prevent Skin Injury  Recent Flowsheet Documentation  Taken 4/19/2024 1617 by Yina Boss RN  Body Position:   tilted   left  Taken 4/19/2024 1400 by Yina Boss RN  Body Position:   sitting up in bed   position changed independently  Taken 4/19/2024 1200 by Yina Boss RN  Body Position:   tilted   left  Taken 4/19/2024 0800 by Yina Boss RN  Body Position:   supine   position changed independently  Skin Protection:   adhesive use limited   tubing/devices free from skin contact   skin-to-device areas padded   skin-to-skin areas padded  Intervention: Prevent and Manage VTE (Venous Thromboembolism) Risk  Recent Flowsheet Documentation  Taken 4/19/2024 1617 by Yina Boss RN  Activity Management: activity encouraged  Taken 4/19/2024 1400 by Yina Boss RN  Activity Management: activity encouraged  Taken 4/19/2024 1200 by Yina Boss RN  Activity Management: activity encouraged  Taken 4/19/2024 1000 by Yina Boss RN  Activity Management: activity encouraged  Taken 4/19/2024 0800 by Yina Boss RN  Activity Management: activity encouraged  VTE Prevention/Management:   bilateral   sequential compression devices off   patient refused intervention  Range of Motion: active ROM (range of motion) encouraged  Intervention: Prevent Infection  Recent Flowsheet Documentation  Taken 4/19/2024 1617 by Yina Boss RN  Infection Prevention:   single patient room provided   rest/sleep promoted   hand hygiene promoted   equipment surfaces disinfected   environmental surveillance performed  Taken 4/19/2024 1400 by Yina Boss RN  Infection Prevention:   single patient  room provided   rest/sleep promoted   hand hygiene promoted   equipment surfaces disinfected   environmental surveillance performed  Taken 4/19/2024 1200 by Yina Boss RN  Infection Prevention:   single patient room provided   rest/sleep promoted   hand hygiene promoted   equipment surfaces disinfected   environmental surveillance performed  Taken 4/19/2024 1000 by Yina Boss RN  Infection Prevention:   rest/sleep promoted   single patient room provided   hand hygiene promoted   equipment surfaces disinfected   environmental surveillance performed  Taken 4/19/2024 0800 by Yina Boss RN  Infection Prevention:   single patient room provided   rest/sleep promoted   hand hygiene promoted   equipment surfaces disinfected   environmental surveillance performed  Goal: Optimal Comfort and Wellbeing  Outcome: Ongoing, Progressing  Intervention: Monitor Pain and Promote Comfort  Recent Flowsheet Documentation  Taken 4/19/2024 0800 by Yina Boss RN  Pain Management Interventions:   position adjusted   pillow support provided  Intervention: Provide Person-Centered Care  Recent Flowsheet Documentation  Taken 4/19/2024 0800 by Yina Boss RN  Trust Relationship/Rapport:   care explained   choices provided   emotional support provided   empathic listening provided   questions encouraged   questions answered   reassurance provided   thoughts/feelings acknowledged  Goal: Readiness for Transition of Care  Outcome: Ongoing, Progressing     Problem: Adjustment to Illness (Gastrointestinal Bleeding)  Goal: Optimal Coping with Acute Illness  Outcome: Ongoing, Progressing     Problem: Bleeding (Gastrointestinal Bleeding)  Goal: Hemostasis  Outcome: Ongoing, Progressing     Problem: COPD (Chronic Obstructive Pulmonary Disease) Comorbidity  Goal: Maintenance of COPD Symptom Control  Outcome: Ongoing, Progressing  Intervention: Maintain COPD-Symptom Control  Recent Flowsheet Documentation  Taken 4/19/2024 1617 by Gera  BHARGAVI Bran  Medication Review/Management: medications reviewed  Taken 4/19/2024 1400 by Yina Boss RN  Medication Review/Management: medications reviewed  Taken 4/19/2024 1200 by Yina Boss RN  Medication Review/Management: medications reviewed  Taken 4/19/2024 1000 by Yina Boss RN  Medication Review/Management: medications reviewed  Taken 4/19/2024 0800 by Yina Boss RN  Medication Review/Management: medications reviewed     Problem: Hypertension Comorbidity  Goal: Blood Pressure in Desired Range  Outcome: Ongoing, Progressing  Intervention: Maintain Blood Pressure Management  Recent Flowsheet Documentation  Taken 4/19/2024 1617 by Yina Boss RN  Medication Review/Management: medications reviewed  Taken 4/19/2024 1400 by Yina Boss RN  Medication Review/Management: medications reviewed  Taken 4/19/2024 1200 by Yina Boss RN  Medication Review/Management: medications reviewed  Taken 4/19/2024 1000 by Yina Boss RN  Medication Review/Management: medications reviewed  Taken 4/19/2024 0800 by Yina Boss RN  Medication Review/Management: medications reviewed

## 2024-04-19 NOTE — PAYOR COMM NOTE
"TO:[facility]  FROM:ANITA CAMPOVERDE, RN PHONE [phone]  IN CLINICALS REF# [id number]    Taylor Ohara Jr. (48 y.o. Male)       Date of Birth   1975    Social Security Number       Address   [address] [facility] [address]    Home Phone   [phone]    MRN   [id number]       Rastafari   None    Marital Status                               Admission Date   24    Admission Type   Emergency    Admitting Provider   Ashvin Kelley DO    Attending Provider   Ashvin Kelley DO    Department, Room/Bed   [facility] TELEMETRY        Discharge Date       Discharge Disposition       Discharge Destination                                 Attending Provider: Ashvin Kelley DO    Allergies: Sulfa Antibiotics    Isolation: None   Infection: None   Code Status: CPR    Ht: 177.8 cm (70\")   Wt: 71.1 kg (156 lb 12 oz)    Admission Cmt: None   Principal Problem: GIB (gastrointestinal bleeding) [K92.2]                   Active Insurance as of 2024       Primary Coverage       Payor Plan Insurance Group Employer/Plan Group    [facility] COMMUNITY PLAN [facility] COMMUNITY PLAN [facility]       Payor Plan Address Payor Plan Phone Number Payor Plan Fax Number Effective Dates    [address]   3/1/2022 - None Entered    [facility] [id number]         Subscriber Name Subscriber Birth Date Member ID       TAYLOR OHARA JR. 1975 [id number]                     Emergency Contacts        (Rel.) Home Phone Work Phone Mobile Phone    RYDER DAMIAN (Mother) -- -- [phone]                 History & Physical        Ashvin Kelley DO at 24 0946            [facility] HOSPITALIST   HISTORY AND PHYSICAL      Name:  Taylor Ohara Jr.   Age:  48 y.o.  Sex:  male  :  1975  MRN:  [id number]   Visit Number:  [id number]  Admission Date:  2024  Date Of Service:  24  Primary Care Physician:  Tigist Wheatley, " APRN    Chief Complaint:     Abdominal pain    History Of Presenting Illness:      Patient is a chronically ill 48-year-old male with history of erosive esophagitis with bleeding gastric ulcer, iron deficiency anemia, opioid abuse on Suboxone, and hypertension.  Patient presents from home with his mother for continued abdominal pain, nausea/vomiting.  Also notes bright red blood per rectum.  Patient follows with Dr. Barbara Mata.  Was seen in the office 4/11/2024 for complaints of abdominal pain with nausea/vomiting.  At the time it is noted that patient is continuing to take NSAIDs despite counseling.  Will take Aleve and ibuprofen nearly daily for chronic joint pain.  Patient again underwent counseling, changed Protonix to Nexium 40 mg daily.  He was also prescribed Carafate.  EGD was scheduled for which patient did not follow-up.  Most recent EGD performed 1/19/2024 which showed persistent erosive gastropathy.  Continues to smoke 1 pack/day.  Denies excessive alcohol use.  Upon arrival to the ER, patient afebrile hypertensive and nonhypoxic on room air.  Labs significant for BUN 26.  CBC unremarkable, hemoglobin 13.8 and hematocrit 44.  UA negative.  CT abdomen pelvis showed no obvious inflammatory changes or bowel wall thickening.  No evidence of bowel obstruction.  Does note nonobstructing right renal stones.  Gastroenterology agreed to consult recommended observation with plans for endoscopy.  Patient started on Protonix drip and hospitalist asked to admit.    Review Of Systems:    All systems were reviewed and negative except as mentioned in history of presenting illness, assessment and plan.    Past Medical History: Patient  has a past medical history of Anemia, Anxiety, Asthma, Back pain, Bleeding ulcer, Body piercing (10/06/2021), CHF (congestive heart failure), Chronic venous insufficiency, Colitis, Constipation, COPD (chronic obstructive pulmonary disease), [place] or callus, Coronary artery disease,  Depression, GERD (gastroesophageal reflux disease), GI bleed, Gout, Headache, History of ASCVD (atherosclerotic cardiovascular disease), History of blood transfusion, History of fracture (10/06/2021), History of heart attack, History of stomach ulcers, Hyperlipidemia, Hypertension, Migraine headache, Mild aortic valve sclerosis, Mild mitral insufficiency, On home oxygen therapy, Poor historian (10/06/2021), Snores, Tattoos, Vision problems, and Weight loss.    Past Surgical History: Patient  has a past surgical history that includes Appendectomy; Fracture surgery (Right); [place] tooth extraction; Colonoscopy; Esophagogastroduodenoscopy; Esophagogastroduodenoscopy (N/A, 11/5/2021); and Esophagogastroduodenoscopy (N/A, 1/19/2023).    Social History: Patient  reports that he has been smoking cigarettes. He started smoking about 33 years ago. He has a 16.6 pack-year smoking history. He has been exposed to tobacco smoke. His smokeless tobacco use includes snuff. He reports that he does not currently use alcohol. He reports that he does not currently use drugs.    Family History:  Patient's family history has been reviewed and found to be noncontributory.     Allergies:      Sulfa antibiotics    Home Medications:    Prior to Admission Medications       Prescriptions Last Dose Informant Patient Reported? Taking?    buprenorphine-naloxone (SUBOXONE) 8-2 MG per SL tablet   Yes No    Place 1.5 tablets under the tongue Daily.    cyclobenzaprine (FLEXERIL) 10 MG tablet   Yes No    Patient not taking:  Reported on 4/11/2024    esomeprazole (nexIUM) 40 MG capsule   No No    Take 1 capsule by mouth 2 (Two) Times a Day.    hydroCHLOROthiazide 12.5 MG tablet   Yes No    Take 1 tablet by mouth Daily.    hydrOXYzine (ATARAX) 50 MG tablet   No No    TAKE 1 TABLET BY MOUTH THREE TIMES DAILY AS NEEDED FOR ANXIETY OR SLEEP    losartan (COZAAR) 50 MG tablet   Yes No    metoprolol succinate XL (TOPROL-XL) 25 MG 24 hr tablet   "Spouse/Significant Other Yes No    Take 1 tablet by mouth Daily.    naloxone (NARCAN) 4 MG/0.1ML nasal spray   No No    1 spray into the nostril(s) as directed by provider As Needed (.). use for oversedation and call 911    ondansetron (Zofran) 4 MG tablet   No No    Take 1 tablet by mouth Every 8 (Eight) Hours As Needed for Nausea.    PARoxetine (PAXIL) 20 MG tablet   No No    Take 1 tablet by mouth Daily.    Patient not taking:  Reported on 4/11/2024    senna 8.6 MG tablet   No No    Take 2 tablets by mouth Every Night.    sucralfate (Carafate) 1 g tablet   No No    Take 1 tablet by mouth 4 (Four) Times a Day for 10 days. Indications: Stomach Ulcer          ED Medications:    Medications   sodium chloride 0.9 % flush 10 mL (has no administration in time range)   sodium chloride 0.9 % infusion (250 mL/hr Intravenous New Bag 4/18/24 0723)   pantoprazole (PROTONIX) 40 mg in 100mL NS IVPB (8 mg/hr Intravenous New Bag 4/18/24 0733)   morphine injection 4 mg (4 mg Intravenous Given 4/18/24 0911)   ondansetron (ZOFRAN) injection 4 mg (4 mg Intravenous Given 4/18/24 0723)   pantoprazole (PROTONIX) injection 40 mg (40 mg Intravenous Given 4/18/24 0723)   Morphine sulfate (PF) injection 2 mg (2 mg Intravenous Given 4/18/24 0723)     Vital Signs:  Temp:  [97.7 °F (36.5 °C)] 97.7 °F (36.5 °C)  Heart Rate:  [73-92] 80  Resp:  [20] 20  BP: (123-169)/() 161/99        04/18/24  0640   Weight: 72.6 kg (160 lb)     Body mass index is 22.96 kg/m².    Physical Exam:     Most recent vital Signs: /99   Pulse 80   Temp 97.7 °F (36.5 °C) (Oral)   Resp 20   Ht 177.8 cm (70\")   Wt 72.6 kg (160 lb)   SpO2 99%   BMI 22.96 kg/m²     Physical Exam  Vitals reviewed.   Constitutional:       General: He is not in acute distress.     Appearance: He is normal weight. He is ill-appearing.   HENT:      Head: Normocephalic and atraumatic.      Right Ear: External ear normal.      Left Ear: External ear normal.      Mouth/Throat:     " " Mouth: Mucous membranes are moist.      Pharynx: Oropharynx is clear.   Eyes:      Extraocular Movements: Extraocular movements intact.      Conjunctiva/sclera: Conjunctivae normal.   Cardiovascular:      Rate and Rhythm: Normal rate and regular rhythm.      Pulses: Normal pulses.      Heart sounds: Normal heart sounds.   Pulmonary:      Effort: Pulmonary effort is normal.      Breath sounds: Normal breath sounds.   Abdominal:      General: There is no distension.      Tenderness: There is abdominal tenderness. There is guarding.   Musculoskeletal:         General: Normal range of motion.      Right lower leg: No edema.      Left lower leg: No edema.   Skin:     General: Skin is warm and dry.   Neurological:      General: No focal deficit present.      Mental Status: He is alert and oriented to person, place, and time.   Psychiatric:         Behavior: Behavior normal.         Laboratory data:    I have reviewed the labs done in the emergency room.    Results from last 7 days   Lab Units 04/18/24  0659   SODIUM mmol/L 140   POTASSIUM mmol/L 4.5   CHLORIDE mmol/L 103   CO2 mmol/L 21.9*   BUN mg/dL 26*   CREATININE mg/dL 0.88   CALCIUM mg/dL 10.0   BILIRUBIN mg/dL <0.2   ALK PHOS U/L 77   ALT (SGPT) U/L 9   AST (SGOT) U/L 12   GLUCOSE mg/dL 126*     Results from last 7 days   Lab Units 04/18/24  0659   WBC 10*3/mm3 8.65   HEMOGLOBIN g/dL 14.6   HEMATOCRIT % 43.7   PLATELETS 10*3/mm3 249                     Results from last 7 days   Lab Units 04/18/24  0659   LIPASE U/L 82*               Invalid input(s): \"USDES\", \"NITRITITE\", \"BACT\", \"EP\"    Pain Management Panel  More data exists         Latest Ref Rng & Units 6/5/2023 5/15/2023   Pain Management Panel   Amphetamine, Urine Qual Negative Negative  Negative    Barbiturates Screen, Urine Negative Negative  Negative    Benzodiazepine Screen, Urine Negative Negative  Negative    Buprenorphine, Screen, Urine Negative Positive  Negative    Cocaine Screen, Urine Negative " Negative  Negative    Methadone Screen , Urine Negative Negative  Negative    Methamphetamine, Ur Negative Negative  Negative        EKG:          Radiology:    CT Abdomen Pelvis Without Contrast    Result Date: 4/18/2024  PROCEDURE: CT ABDOMEN PELVIS WO CONTRAST-  HISTORY: GI bleed, concern for perf; R10.9-Unspecified abdominal pain; G89.29-Other chronic pain; K27.9-Peptic ulcer, site unspecified, unspecified as acute or chronic, without hemorrhage or perforation  COMPARISON: 11/28/2023  Note: Noncontrast exam is less sensitive for assessment of the bowel and solid abdominal organs  TECHNIQUE: Noncontrast exam  CT ABDOMEN: Nonobstructing right renal stones are seen within the calyces largest measuring up to 6 mm similar to prior exam. Otherwise solid organs are grossly unremarkable. The bowel shows no evidence of obstruction. There is no free air or free fluid within the abdomen. Gallbladder is normal. Mild fecal impaction of the colon is present. No gross bowel wall thickening is appreciated.  CT PELVIS: No bowel dilatation is seen. There is no free fluid. Appendix is nonvisualized. No secondary findings of appendicitis are seen. Prostate is minimally enlarged..      1. No obvious inflammatory changes or bowel wall thickening. 2. No evidence of bowel obstruction 3. Nonobstructing right renal stones  This study was performed with techniques to keep radiation doses as low as reasonably achievable (ALARA). Individualized dose reduction techniques using automated exposure control or adjustment of vA and/or kV according to the patient size were employed.  This report was signed and finalized on 4/18/2024 8:00 AM by Aly Tong MD.       Assessment:    Bright red blood per rectum, GI bleed POA  History of gastric ulcer with hemorrhage  History of erosive gastropathy  Frequent NSAID use  Iron deficiency anemia  Hypertension  CAD  Depression/anxiety  Tobacco abuse    Plan:    Will admit patient hospital for observation,  anticipate less than 2 midnight stay.    GI bleed  -GI consult  -Plan for endoscopy tomorrow  -Continue Protonix drip  - against NSAID use  -Monitor H&H, no indication for PRBC transfusion  -Supportive care    Nicotine patch.  Further orders as clinical course dictates.    Risk Assessment: High  DVT Prophylaxis: SCDs  Code Status: Full  Diet: N.p.o.             Ashvin Kelley DO  04/18/24  09:46 EDT    Dictated utilizing Dragon dictation.      Electronically signed by Ashvin Kelley DO at 04/18/24 1511          Emergency Department Notes        Ashvin Levi PCT at 04/18/24 0711       Summary:GI                 Transferred Dr. Graff to Dr. Edward at this time.     Electronically signed by Ashvin Levi PCT at 04/18/24 0786       Mark Edward MD at 04/18/24 0656          Subjective   History of Present Illness  This patient is an unfortunate 48-year-old gentleman with a longstanding history of abdominal complaints.  Please refer to past medical documentation including recent GI note from 4/11/2024 which I have reviewed in detail.  Patient has had several EGDs.  It sounds like he is being scheduled for another EGD and potential colonoscopy in the coming days/weeks.  He has had ongoing complaints of abdominal pain and his GI physician believes this is related to persistent alcohol, tobacco and NSAID usage.  Patient comes in today stating that he has had pain for several months worse over the last week or so.  He has seen his PCP and his GI physician as mentioned above.  He has had ongoing nausea and vomiting as well.  Patient has had some of his medications adjusted by GI.  He has a history of PUD and bleeding ulcer.  He received a transfusion in the past as well.  He denies any hemoptysis or hematemesis currently.  Denies any dark tarry stools but has had some bright red blood per rectum.  He is resting comfortably, oriented x 4 and unaccompanied.    Patient denies chest pain,  shortness of breath, fevers, myalgias or headache.  He reports that he has symptoms like this quite often but that this discomfort and pain is worse than usual.    Past medical history  PUD, COPD, bleeding ulcer, dyslipidemia, hypertension, depression, migraine headaches, CAD, GERD    Family history  Thyroid disease and hypertension, mom      Review of Systems   Constitutional: Negative.  Negative for chills, fatigue, fever and unexpected weight change.   HENT:  Negative for dental problem, ear pain, hearing loss and sinus pressure.    Eyes:  Negative for pain and visual disturbance.   Respiratory:  Negative for chest tightness and shortness of breath.    Cardiovascular:  Negative for chest pain, palpitations and leg swelling.   Gastrointestinal:  Positive for abdominal pain, nausea and vomiting. Negative for blood in stool and diarrhea.   Genitourinary:  Negative for difficulty urinating, dysuria, frequency, hematuria and urgency.   Musculoskeletal:  Negative for myalgias, neck pain and neck stiffness.   Neurological:  Negative for seizures, syncope, speech difficulty, light-headedness and headaches.   Psychiatric/Behavioral:  Negative for confusion.    All other systems reviewed and are negative.      Past Medical History:   Diagnosis Date    Anemia     Anxiety     Asthma     Back pain     Bleeding ulcer     Body piercing 10/06/2021    ears    CHF (congestive heart failure)     Reported by patient's wife    Chronic venous insufficiency     Colitis     Constipation     COPD (chronic obstructive pulmonary disease)     [place] or callus     Coronary artery disease     Depression     GERD (gastroesophageal reflux disease)     GI bleed     Gout     Headache     History of ASCVD (atherosclerotic cardiovascular disease)     History of blood transfusion     Spouse reported no reaction to transfuson     History of fracture 10/06/2021    Hx right pinky fracture - did require surgery    History of heart attack     Spouse  "reported \"2 light heart attacks at age 31\"     History of stomach ulcers     Hyperlipidemia     Hypertension     Migraine headache     Mild aortic valve sclerosis     Mild mitral insufficiency     Spouse reported MVP and that Dr Preciado said not sever enough to warrant surgery    On home oxygen therapy     Spouse reported at 2L/min NC prn    Poor historian 10/06/2021    Snores     Tattoos     Vision problems     Weight loss     Recent Weight Loss Involuntary For Over A Year Or More       Allergies   Allergen Reactions    Sulfa Antibiotics Other (See Comments)     \"causes his insides to burn and his skin gets really red\" reported by patient's spouse        Past Surgical History:   Procedure Laterality Date    APPENDECTOMY      COLONOSCOPY      ENDOSCOPY      ENDOSCOPY N/A 11/5/2021    Procedure: ESOPHAGOGASTRODUODENOSCOPY WITH BIOPSY;  Surgeon: Leonor Graff MD;  Location: [facility] ENDOSCOPY;  Service: Gastroenterology;  Laterality: N/A;    ENDOSCOPY N/A 1/19/2023    Procedure: ESOPHAGOGASTRODUODENOSCOPY WITH BIOPSIES;  Surgeon: Leonor Graff MD;  Location: [facility] ENDOSCOPY;  Service: Gastroenterology;  Laterality: N/A;    FRACTURE SURGERY Right     Pinky finger    WISDOM TOOTH EXTRACTION         Family History   Problem Relation Age of Onset    Arthritis Mother     Thyroid disease Mother     Hypertension Mother     Migraines Sister     Thyroid disease Sister     Hypertension Sister     Mental illness Brother     Cancer Paternal Uncle     Arthritis Maternal Grandmother     Stroke Maternal Grandmother     Colon cancer Neg Hx     Cirrhosis Neg Hx     Liver cancer Neg Hx     Liver disease Neg Hx        Social History     Socioeconomic History    Marital status:    Tobacco Use    Smoking status: Every Day     Current packs/day: 0.50     Average packs/day: 0.5 packs/day for 33.3 years (16.6 ttl pk-yrs)     Types: Cigarettes     Start date: 1991     Passive exposure: Current    Smokeless tobacco: " Current     Types: Snuff   Vaping Use    Vaping status: Some Days    Substances: Nicotine (Spouse reported no use in 2 years), Flavoring    Devices: Disposable   Substance and Sexual Activity    Alcohol use: Not Currently     Comment: social    Drug use: Not Currently     Comment: opiates, snort    Sexual activity: Defer           Objective   Physical Exam  Vitals and nursing note reviewed.   Constitutional:       General: He is not in acute distress.     Appearance: He is well-developed. He is not toxic-appearing.   HENT:      Head: Normocephalic and atraumatic.      Jaw: No trismus.      Right Ear: External ear normal.      Left Ear: External ear normal.      Nose: Nose normal.      Mouth/Throat:      Mouth: Mucous membranes are moist. Mucous membranes are not dry. No oral lesions.      Dentition: No dental abscesses.      Pharynx: Oropharynx is clear. No posterior oropharyngeal erythema or uvula swelling.      Tonsils: No tonsillar exudate or tonsillar abscesses.   Eyes:      General:         Right eye: No discharge.         Left eye: No discharge.      Extraocular Movements: Extraocular movements intact.      Conjunctiva/sclera: Conjunctivae normal.      Right eye: Right conjunctiva is not injected.      Left eye: Left conjunctiva is not injected.      Pupils: Pupils are equal, round, and reactive to light.   Neck:      Vascular: No JVD.      Trachea: No tracheal tenderness.   Cardiovascular:      Rate and Rhythm: Normal rate and regular rhythm.      Heart sounds: Normal heart sounds.      No friction rub. No gallop.   Pulmonary:      Effort: Pulmonary effort is normal.      Breath sounds: Normal breath sounds. No wheezing or rales.   Chest:      Chest wall: No tenderness.   Abdominal:      General: Bowel sounds are normal. There is no distension.      Palpations: Abdomen is soft. Abdomen is not rigid. There is no mass or pulsatile mass.      Tenderness: There is abdominal tenderness. There is no guarding or  rebound. Negative signs include McBurney's sign.      Comments: No signs of acute abdomen.  No pain at McBurney's point.  No pulsatile abdominal mass.  Mild mid abdominal discomfort only.   Musculoskeletal:         General: No tenderness or deformity. Normal range of motion.      Cervical back: Normal range of motion and neck supple. No rigidity. Normal range of motion.   Lymphadenopathy:      Cervical: No cervical adenopathy.   Skin:     General: Skin is warm and dry.      Capillary Refill: Capillary refill takes less than 2 seconds.      Findings: No erythema or rash.      Comments: No diaphoresis, lesions, nevi, petechia, purpura   Neurological:      Mental Status: He is alert and oriented to person, place, and time.      Cranial Nerves: No cranial nerve deficit.      Sensory: No sensory deficit.      Motor: No tremor or abnormal muscle tone.      Comments: 5/5 strength bilaterally with flexion and extension of fingers, wrist, elbows, knees and hips as well as plantar and dorsiflexion of the foot.   Psychiatric:         Attention and Perception: He is attentive.         Speech: Speech normal.         Behavior: Behavior normal.         Thought Content: Thought content normal.         Judgment: Judgment normal.         Procedures          ED Course  ED Course as of 04/18/24 1531   Thu Apr 18, 2024   0651 I had a good conversation with the patient regarding the limitations of evaluation here and likely next steps.  Given the fact the patient was just seen by his GI physician 1 week ago and has an EGD and colonoscopy scheduled, I plan to discuss the case with  following results of the patient's labs.  At this point, the patient is not actively vomiting but fluids and Zofran have been ordered.  Will give a dose of IV Protonix as well although this has not worked incredibly well in the past, I wanted to give the patient something protective that might help.  I do not anticipate the patient will have any lab  derangements in need of immediate correction.  It looks like the recommended lifestyle changes have not been started by the patient and I share his GI specialist concerned that unless these changes are enacted, the patient is going to continue to suffer symptoms.  Patient does not have an acute abdomen in any way currently.  His symptoms are chronic and have been ongoing over the last several months to years.  Will discuss the case with GI in order to ensure we have a good plan for the patient going forward.  Differential diagnosis and medical decision making have been discussed in great detail with the patient.  Certainly must consider exacerbation of chronic PUD associated with NSAID usage, alcohol and tobacco.  Must also consider perforation, bleeding ulcer, small bowel obstruction, pancreatitis and other etiologies.  Labs should help us to differentiate these.  I do not leave advanced imaging is necessary at this time but we may go down this path based on a change in clinical exam or following consultation.  Patient is aware of the plan and without question or complaint.  Final impression and plan following completion of workup. [EZEQUIEL]   0714 I discussed the case with Dr. Graff personally at approximately 7:12 AM Eastern time.  He will see the patient in the hospital.  I do believe he will move forward with EGD and colonoscopy and I shared this with the patient and his mom.  Will get a CT scan of the abdomen and pelvis given the patient's pain and my concern for perforation.  Have ordered Zofran, Protonix and will add Protonix drip as well.  Will give a small dose of pain medication as well. [EZEQUIEL]   0716 Vital signs are stable currently.  Patient does not have evidence of tachycardia.  Current heart rate is 80.  Exam is concerning.  CT scan of the abdomen and pelvis ordered and pending.  GI has been formally consulted. [EZEQUIEL]   0744 I reexamined the patient personally at approximately 7:30 AM Eastern time.  I  "found him resting comfortably and he told me he felt \"much better.\"  His mom was very happy as well.  We discussed the labs that were back at that time including the CBC.  We talked about the likely need for admission and the plan for his GI physician to complete an EGD.  Patient is planning to go over to CT scan here shortly. [EZEQUIEL]   0759 I reexamined the patient after CT scan and he is feeling much better.  I reexamined his abdomen and he is still incredibly tender.  CT images not yet available but I plan to review them as soon as they are made available. [EZEQUIEL]   0808 Lipase is 82.  CMP shows a BUN and creatinine of 26 and 0.8.  CO2 21.9.  Transaminase, alkaline phosphatase and bilirubin normal.  Urinalysis pending.  CBC unremarkable as noted. [EZEQUIEL]   0809 CT scan of the abdomen pelvis was reviewed independently by me.  I do not see any evidence of acute disease process.  Specifically I do not see any evidence of fluid collection or free air.  Official radiologic read confirms my own independent interpretation and has been cut/pasted below for completeness.    IMPRESSION:  1. No obvious inflammatory changes or bowel wall thickening.  2. No evidence of bowel obstruction  3. Nonobstructing right renal stones     This study was performed with techniques to keep radiation doses as low  as reasonably achievable (ALARA). Individualized dose reduction  techniques using automated exposure control or adjustment of vA and/or  kV according to the patient size were employed.      This report was signed and finalized on 4/18/2024 8:00 AM by Aly Tong MD.   [EZEQUIEL]   0809 Still awaiting urinalysis which I anticipate we will get soon.  I am encouraged that the patient is feeling much better.  Given the patient's history of bright red blood per rectum, history of GI bleed and plan for GI to already evaluate the patient via EGD and colonoscopy, will bring the patient into the hospital to be evaluated by GI as previously discussed " with GI.  Hospitalist appears to be Dr. Kerley.  Will bring the patient in for abdominal pain, bright red blood per rectum, history of GI bleed and PUD. [EZEQUIEL]   4157 I talked to the patient and his mom about the CT results and the plan for admission.  They were very appreciative for care.  Mom asked if I knew when exactly the procedure would take place and I told her I did not but would let the admitting and GI consultative teams update them when more information was available. [EZEQUIEL]      ED Course User Index  [EZEQUIEL] Mark Edward MD      Recent Results (from the past 24 hour(s))   Comprehensive Metabolic Panel    Collection Time: 04/18/24  6:59 AM    Specimen: Blood   Result Value Ref Range    Glucose 126 (H) 65 - 99 mg/dL    BUN 26 (H) 6 - 20 mg/dL    Creatinine 0.88 0.76 - 1.27 mg/dL    Sodium 140 136 - 145 mmol/L    Potassium 4.5 3.5 - 5.2 mmol/L    Chloride 103 98 - 107 mmol/L    CO2 21.9 (L) 22.0 - 29.0 mmol/L    Calcium 10.0 8.6 - 10.5 mg/dL    Total Protein 7.7 6.0 - 8.5 g/dL    Albumin 4.2 3.5 - 5.2 g/dL    ALT (SGPT) 9 1 - 41 U/L    AST (SGOT) 12 1 - 40 U/L    Alkaline Phosphatase 77 39 - 117 U/L    Total Bilirubin <0.2 0.0 - 1.2 mg/dL    Globulin 3.5 gm/dL    A/G Ratio 1.2 g/dL    BUN/Creatinine Ratio 29.5 (H) 7.0 - 25.0    Anion Gap 15.1 (H) 5.0 - 15.0 mmol/L    eGFR 105.4 >60.0 mL/min/1.73   Lipase    Collection Time: 04/18/24  6:59 AM    Specimen: Blood   Result Value Ref Range    Lipase 82 (H) 13 - 60 U/L   Green Top (Gel)    Collection Time: 04/18/24  6:59 AM   Result Value Ref Range    Extra Tube Hold for add-ons.    Lavender Top    Collection Time: 04/18/24  6:59 AM   Result Value Ref Range    Extra Tube hold for add-on    Gold Top - SST    Collection Time: 04/18/24  6:59 AM   Result Value Ref Range    Extra Tube Hold for add-ons.    Light Blue Top    Collection Time: 04/18/24  6:59 AM   Result Value Ref Range    Extra Tube Hold for add-ons.    CBC Auto Differential    Collection Time: 04/18/24   6:59 AM    Specimen: Blood   Result Value Ref Range    WBC 8.65 3.40 - 10.80 10*3/mm3    RBC 4.91 4.14 - 5.80 10*6/mm3    Hemoglobin 14.6 13.0 - 17.7 g/dL    Hematocrit 43.7 37.5 - 51.0 %    MCV 89.0 79.0 - 97.0 fL    MCH 29.7 26.6 - 33.0 pg    MCHC 33.4 31.5 - 35.7 g/dL    RDW 14.0 12.3 - 15.4 %    RDW-SD 45.3 37.0 - 54.0 fl    MPV 9.3 6.0 - 12.0 fL    Platelets 249 140 - 450 10*3/mm3    Neutrophil % 55.7 42.7 - 76.0 %    Lymphocyte % 34.6 19.6 - 45.3 %    Monocyte % 6.4 5.0 - 12.0 %    Eosinophil % 2.8 0.3 - 6.2 %    Basophil % 0.3 0.0 - 1.5 %    Immature Grans % 0.2 0.0 - 0.5 %    Neutrophils, Absolute 4.82 1.70 - 7.00 10*3/mm3    Lymphocytes, Absolute 2.99 0.70 - 3.10 10*3/mm3    Monocytes, Absolute 0.55 0.10 - 0.90 10*3/mm3    Eosinophils, Absolute 0.24 0.00 - 0.40 10*3/mm3    Basophils, Absolute 0.03 0.00 - 0.20 10*3/mm3    Immature Grans, Absolute 0.02 0.00 - 0.05 10*3/mm3    nRBC 0.0 0.0 - 0.2 /100 WBC   Urinalysis With Microscopic If Indicated (No Culture) - Urine, Clean Catch    Collection Time: 04/18/24 10:39 AM    Specimen: Urine, Clean Catch   Result Value Ref Range    Color, UA Yellow Yellow, Straw    Appearance, UA Clear Clear    pH, UA 6.5 5.0 - 8.0    Specific Gravity, UA 1.019 1.005 - 1.030    Glucose, UA Negative Negative    Ketones, UA Negative Negative    Bilirubin, UA Negative Negative    Blood, UA Negative Negative    Protein, UA Negative Negative    Leuk Esterase, UA Negative Negative    Nitrite, UA Negative Negative    Urobilinogen, UA 0.2 E.U./dL 0.2 - 1.0 E.U./dL   Urine Drug Screen - Urine, Clean Catch    Collection Time: 04/18/24 10:39 AM    Specimen: Urine, Clean Catch   Result Value Ref Range    THC, Screen, Urine Negative Negative    Phencyclidine (PCP), Urine Negative Negative    Cocaine Screen, Urine Negative Negative    Methamphetamine, Ur Negative Negative    Opiate Screen Positive (A) Negative    Amphetamine Screen, Urine Negative Negative    Benzodiazepine Screen, Urine Negative  Negative    Tricyclic Antidepressants Screen Negative Negative    Methadone Screen, Urine Negative Negative    Barbiturates Screen, Urine Negative Negative    Oxycodone Screen, Urine Negative Negative    Buprenorphine, Screen, Urine Negative Negative   Fentanyl, Urine - Urine, Clean Catch    Collection Time: 04/18/24 10:39 AM    Specimen: Urine, Clean Catch   Result Value Ref Range    Fentanyl, Urine Negative Negative   Hemoglobin    Collection Time: 04/18/24  2:07 PM    Specimen: Blood   Result Value Ref Range    Hemoglobin 13.8 13.0 - 17.7 g/dL   Type & Screen    Collection Time: 04/18/24  2:07 PM    Specimen: Blood   Result Value Ref Range    ABO Type O     RH type Positive     Antibody Screen Negative     T&S Expiration Date 4/21/2024 11:59:59 PM    ABO RH Specimen Verification    Collection Time: 04/18/24  2:14 PM    Specimen: Blood   Result Value Ref Range    ABO Type O     RH type Positive      Note: In addition to lab results from this visit, the labs listed above may include labs taken at another facility or during a different encounter within the last 24 hours. Please correlate lab times with ED admission and discharge times for further clarification of the services performed during this visit.    CT Abdomen Pelvis Without Contrast   Final Result   1. No obvious inflammatory changes or bowel wall thickening.   2. No evidence of bowel obstruction   3. Nonobstructing right renal stones       This study was performed with techniques to keep radiation doses as low   as reasonably achievable (ALARA). Individualized dose reduction   techniques using automated exposure control or adjustment of vA and/or   kV according to the patient size were employed.        This report was signed and finalized on 4/18/2024 8:00 AM by Aly Tong MD.            Vitals:    04/18/24 1400 04/18/24 1401 04/18/24 1402 04/18/24 1444   BP: 159/96   139/95   BP Location:    Left arm   Patient Position:    Sitting   Pulse:    73  "  Resp:    16   Temp:    97.4 °F (36.3 °C)   TempSrc:    Oral   SpO2: 99% 100% 98% 98%   Weight:    70.5 kg (155 lb 6.8 oz)   Height:    177.8 cm (70\")     Medications   sodium chloride 0.9 % flush 10 mL (has no administration in time range)   sodium chloride 0.9 % infusion (0 mL/hr Intravenous Stopped 4/18/24 1037)   pantoprazole (PROTONIX) 40 mg in 100mL NS IVPB (8 mg/hr Intravenous New Bag 4/18/24 1338)   morphine injection 4 mg (4 mg Intravenous Given 4/18/24 0911)   hydroCHLOROthiazide oral 12.5 mg (12.5 mg Oral Given 4/18/24 1401)   metoprolol succinate XL (TOPROL-XL) 24 hr tablet 25 mg (25 mg Oral Given 4/18/24 1339)   losartan (COZAAR) tablet 50 mg (50 mg Oral Given 4/18/24 1401)   buprenorphine-naloxone (SUBOXONE) 8-2 MG film 1 film (1 film Sublingual Given 4/18/24 1339)   sodium chloride 0.9 % flush 10 mL (10 mL Intravenous Given 4/18/24 1340)   sodium chloride 0.9 % flush 10 mL (has no administration in time range)   sodium chloride 0.9 % infusion 40 mL (has no administration in time range)   acetaminophen (TYLENOL) tablet 650 mg (650 mg Oral Given 4/18/24 1339)     Or   acetaminophen (TYLENOL) 160 MG/5ML oral solution 650 mg ( Oral Not Given:  See Alt 4/18/24 1339)     Or   acetaminophen (TYLENOL) suppository 650 mg ( Rectal Not Given:  See Alt 4/18/24 1339)   ondansetron (ZOFRAN) injection 4 mg (has no administration in time range)   nitroglycerin (NITROSTAT) SL tablet 0.4 mg (has no administration in time range)   sodium chloride 0.9 % infusion (100 mL/hr Intravenous New Bag 4/18/24 1338)   Potassium Replacement - Follow Nurse / BPA Driven Protocol (has no administration in time range)   Magnesium Standard Dose Replacement - Follow Nurse / BPA Driven Protocol (has no administration in time range)   Phosphorus Replacement - Follow Nurse / BPA Driven Protocol (has no administration in time range)   Calcium Replacement - Follow Nurse / BPA Driven Protocol (has no administration in time range) "   sennosides-docusate (PERICOLACE) 8.6-50 MG per tablet 2 tablet (has no administration in time range)     And   polyethylene glycol (MIRALAX) packet 17 g (has no administration in time range)     And   bisacodyl (DULCOLAX) EC tablet 5 mg (has no administration in time range)     And   bisacodyl (DULCOLAX) suppository 10 mg (has no administration in time range)   melatonin tablet 5 mg (has no administration in time range)   polyethylene glycol (GoLYTELY) solution 2,000 mL (has no administration in time range)   nicotine (NICODERM CQ) 21 MG/24HR patch 1 patch (1 patch Transdermal Medication Applied 4/18/24 1507)   ondansetron (ZOFRAN) injection 4 mg (4 mg Intravenous Given 4/18/24 0723)   pantoprazole (PROTONIX) injection 40 mg (40 mg Intravenous Given 4/18/24 0723)   Morphine sulfate (PF) injection 2 mg (2 mg Intravenous Given 4/18/24 0723)   bisacodyl (DULCOLAX) EC tablet 20 mg (20 mg Oral Given 4/18/24 1339)     ECG/EMG Results (last 24 hours)       ** No results found for the last 24 hours. **          No orders to display                                      KATHY reviewed by Ashvin Kelley DO, Corbett, Jeremy J, MD       Medical Decision Making  Certainly must consider exacerbation of chronic condition/conditions.  Please consider bleeding ulcer, perforation, pancreatitis, small bowel obstruction.  Certainly could consider pneumonia pneumothorax and other lower pulmonic causes as well.  Must consider gastroenteritis, gastritis as well.  Will start by giving fluids and some medications and checking labs.  I do not believe advanced imaging warranted at this time.  Will also get GI consultation given recent evaluation and plan for further evaluation as an outpatient.    Problems Addressed:  Bright red blood per rectum: chronic illness or injury  Chronic abdominal pain: complicated acute illness or injury  PUD (peptic ulcer disease): chronic illness or injury    Amount and/or Complexity of Data  Reviewed  External Data Reviewed: notes.  Labs: ordered. Decision-making details documented in ED Course.  Radiology: ordered.    Risk  Prescription drug management.  Decision regarding hospitalization.        Final diagnoses:   Chronic abdominal pain   PUD (peptic ulcer disease)   Bright red blood per rectum       ED Disposition  ED Disposition       ED Disposition   Decision to Admit    Condition   --    Comment   Level of Care: Telemetry [5]   Diagnosis: GI bleed [395728]   Admitting Physician: KERLEY, BRIAN JOSEPH [[id number]]   Attending Physician: KERLEY, BRIAN JOSEPH [[id number]]   Certification: I Certify That Inpatient Hospital Services Are Medically Necessary For Greater Than 2 Midnights                 Tigist Wheatley, APRN  [address]   [facility] [id number] [phone]    In 1 week      Leonor Graff MD  [address]  [facility] [address]  [phone]    In 1 week           Medication List      No changes were made to your prescriptions during this visit.            Mark Edward MD  04/18/24 1531      Electronically signed by Mark Edward MD at 04/18/24 1531       Vital Signs (last day)       Date/Time Temp Temp src Pulse Resp BP Patient Position SpO2    04/19/24 0700 98.8 (37.1) Oral 79 14 102/64 Sitting 95    04/19/24 0317 99.3 (37.4) Axillary 84 16 96/60 Lying 92    04/19/24 0000 -- -- -- -- 102/70 -- --    04/18/24 2320 97.3 (36.3) Oral 70 16 82/54 Lying 100    04/18/24 1937 97.3 (36.3) Oral 62 16 95/61 Lying 94    04/18/24 1444 97.4 (36.3) Oral 73 16 139/95 Sitting 98    04/18/24 1402 -- -- -- -- -- -- 98    04/18/24 1401 -- -- -- -- -- -- 100    04/18/24 1400 -- -- -- -- 159/96 -- 99    04/18/24 1342 -- -- -- -- -- -- 98    04/18/24 1341 -- -- -- -- -- -- 98    04/18/24 1340 -- -- -- -- -- -- 97    04/18/24 1339 -- -- -- -- -- -- 97    04/18/24 1338 -- -- -- -- 144/95 -- --    04/18/24 1308 -- -- 99 -- -- -- 98    04/18/24 1303 -- -- 75  -- -- -- 98    04/18/24 1258 -- -- 74 -- 144/97 -- 97    04/18/24 1230 -- -- 83 -- 144/101 -- 97    04/18/24 1131 -- -- 74 -- -- -- 98    04/18/24 1130 -- -- 74 -- 155/108 -- 98    04/18/24 1115 -- -- 73 -- -- -- 98    04/18/24 1110 -- -- 74 -- -- -- 98    04/18/24 1105 -- -- 85 -- -- -- 99    04/18/24 1100 -- -- 71 -- 159/102 -- 98    04/18/24 1026 -- -- 76 -- -- -- 98    04/18/24 1021 -- -- 75 -- -- -- 98    04/18/24 1016 -- -- 77 -- -- -- 97    04/18/24 1011 -- -- 75 -- -- -- 98    04/18/24 1006 -- -- 74 -- -- -- 97    04/18/24 1001 -- -- 91 -- 179/101 -- 96    04/18/24 0849 -- -- 80 -- -- -- 99    04/18/24 0844 -- -- 89 -- -- -- 99    04/18/24 0839 -- -- 86 -- -- -- 99    04/18/24 0834 -- -- 83 -- -- -- 100    04/18/24 0829 -- -- 79 -- 161/99 -- 98    04/18/24 0811 -- -- 73 -- -- -- 98    04/18/24 0806 -- -- 76 -- -- -- 96    04/18/24 0801 -- -- 76 -- 169/113 -- 98    04/18/24 0737 -- -- 73 -- -- -- --    04/18/24 0736 -- -- 75 -- -- -- 99    04/18/24 0735 -- -- 74 -- -- -- 99    04/18/24 0734 -- -- 75 -- -- -- 99    04/18/24 0733 -- -- 79 -- -- -- 98    04/18/24 0732 -- -- 76 -- -- -- 100    04/18/24 0731 -- -- 77 -- -- -- 100    04/18/24 0730 -- -- 92 -- 156/118 -- 100    04/18/24 0715 -- -- 80 -- -- -- 100    04/18/24 0640 97.7 (36.5) Oral 90 20 123/102 Sitting 100          Current Facility-Administered Medications   Medication Dose Route Frequency Provider Last Rate Last Admin    [Transfer Hold] acetaminophen (TYLENOL) tablet 650 mg  650 mg Oral Q4H PRN Ashvin Kelley DO   650 mg at 04/18/24 1722    Or    [Transfer Hold] acetaminophen (TYLENOL) 160 MG/5ML oral solution 650 mg  650 mg Oral Q4H PRN Ashvin Kelley DO        Or    [Transfer Hold] acetaminophen (TYLENOL) suppository 650 mg  650 mg Rectal Q4H PRN Ashvin Kelley DO        bisacodyl (DULCOLAX) EC tablet 20 mg  20 mg Oral Once Leonor Graff MD        [Transfer Hold] sennosides-docusate (PERICOLACE) 8.6-50 MG per tablet 2 tablet  2  tablet Oral BID PRN Ashvin Kelley DO        And    [Transfer Hold] polyethylene glycol (MIRALAX) packet 17 g  17 g Oral Daily PRN Ashvin Kelley DO        And    [Transfer Hold] bisacodyl (DULCOLAX) EC tablet 5 mg  5 mg Oral Daily PRN Ashvin Kelley DO        And    [Transfer Hold] bisacodyl (DULCOLAX) suppository 10 mg  10 mg Rectal Daily PRN Ashvin Kelley DO        [Transfer Hold] buprenorphine-naloxone (SUBOXONE) 8-2 MG film 1 film  1 film Sublingual Daily Ashvin Kelley DO   1 film at 04/18/24 1339    [Transfer Hold] Calcium Replacement - Follow Nurse / BPA Driven Protocol   Does not apply PRN Ashvin Kelley DO        [Transfer Hold] hydroCHLOROthiazide oral 12.5 mg  12.5 mg Oral Daily Ashvin Kelley DO   12.5 mg at 04/18/24 1401    losartan (COZAAR) tablet 50 mg  50 mg Oral Q24H Ashvin Kelley DO   50 mg at 04/18/24 1401    [Transfer Hold] Magnesium Standard Dose Replacement - Follow Nurse / BPA Driven Protocol   Does not apply PRN Ashvin Kelley DO        [Transfer Hold] melatonin tablet 5 mg  5 mg Oral Nightly PRN Ashvin Kelley DO        metoprolol succinate XL (TOPROL-XL) 24 hr tablet 25 mg  25 mg Oral Daily Ashvin Kelley DO   25 mg at 04/18/24 1339    [Transfer Hold] morphine injection 4 mg  4 mg Intravenous Q4H PRN Ashvin Kelley DO   4 mg at 04/18/24 2121    [Transfer Hold] nicotine (NICODERM CQ) 21 MG/24HR patch 1 patch  1 patch Transdermal Q24H Ashvin Kelley DO   1 patch at 04/18/24 1507    [Transfer Hold] nitroglycerin (NITROSTAT) SL tablet 0.4 mg  0.4 mg Sublingual Q5 Min PRN Ashvin Kelley DO        [Transfer Hold] ondansetron (ZOFRAN) injection 4 mg  4 mg Intravenous Q6H PRN Ashvin Kelley DO        pantoprazole (PROTONIX) 40 mg in 100mL NS IVPB  8 mg/hr Intravenous Continuous Ashvin Kelley DO 20 mL/hr at 04/18/24 1916 8 mg/hr at 04/18/24 1916    [Transfer Hold] Phosphorus Replacement - Follow Nurse / BPA Driven Protocol   Does not apply PRN Ashvin Kelley DO         Potassium Replacement - Follow Nurse / BPA Driven Protocol   Does not apply PRN Ashvin Kelley DO        [Transfer Hold] sodium chloride 0.9 % flush 10 mL  10 mL Intravenous PRN Mark Edward MD        [Transfer Hold] sodium chloride 0.9 % flush 10 mL  10 mL Intravenous Q12H Ashvin Kelley DO   10 mL at 04/18/24 1340    [Transfer Hold] sodium chloride 0.9 % flush 10 mL  10 mL Intravenous PRN Ashvin Kelley DO        [Transfer Hold] sodium chloride 0.9 % infusion 40 mL  40 mL Intravenous PRN Ashvin Kelley DO        sodium chloride 0.9 % infusion  250 mL/hr Intravenous Continuous Mark Edward MD   Stopped at 04/18/24 1037    sodium chloride 0.9 % infusion  100 mL/hr Intravenous Continuous Ashvin Kelley  mL/hr at 04/18/24 1338 100 mL/hr at 04/18/24 1338     Lab Results (last 24 hours)       Procedure Component Value Units Date/Time    Basic Metabolic Panel [[id number]]  (Normal) Collected: 04/19/24 0541    Specimen: Blood Updated: 04/19/24 0618     Glucose 88 mg/dL      BUN 15 mg/dL      Creatinine 0.77 mg/dL      Sodium 136 mmol/L      Potassium 3.8 mmol/L      Chloride 103 mmol/L      CO2 23.2 mmol/L      Calcium 8.8 mg/dL      BUN/Creatinine Ratio 19.5     Anion Gap 9.8 mmol/L      eGFR 109.7 mL/min/1.73     Narrative:      GFR Normal >60  Chronic Kidney Disease <60  Kidney Failure <15      CBC & Differential [[id number]]  (Abnormal) Collected: 04/19/24 0541    Specimen: Blood Updated: 04/19/24 0604    Narrative:      The following orders were created for panel order CBC & Differential.  Procedure                               Abnormality         Status                     ---------                               -----------         ------                     CBC Auto Differential[[id number]]        Abnormal            Final result                 Please view results for these tests on the individual orders.    CBC Auto Differential [[id number]]  (Abnormal) Collected: 04/19/24 0541     Specimen: Blood Updated: 04/19/24 0604     WBC 8.26 10*3/mm3      RBC 4.14 10*6/mm3      Hemoglobin 12.2 g/dL      Hematocrit 37.4 %      MCV 90.3 fL      MCH 29.5 pg      MCHC 32.6 g/dL      RDW 14.2 %      RDW-SD 47.2 fl      MPV 9.5 fL      Platelets 221 10*3/mm3      Neutrophil % 61.7 %      Lymphocyte % 29.4 %      Monocyte % 6.1 %      Eosinophil % 2.1 %      Basophil % 0.5 %      Immature Grans % 0.2 %      Neutrophils, Absolute 5.10 10*3/mm3      Lymphocytes, Absolute 2.43 10*3/mm3      Monocytes, Absolute 0.50 10*3/mm3      Eosinophils, Absolute 0.17 10*3/mm3      Basophils, Absolute 0.04 10*3/mm3      Immature Grans, Absolute 0.02 10*3/mm3      nRBC 0.0 /100 WBC     Hemoglobin [[id number]]  (Abnormal) Collected: 04/19/24 0005    Specimen: Blood Updated: 04/19/24 0016     Hemoglobin 12.2 g/dL     Hemoglobin [[id number]]  (Abnormal) Collected: 04/18/24 1743    Specimen: Blood from Arm, Left Updated: 04/18/24 1752     Hemoglobin 12.6 g/dL     Hemoglobin [[id number]]  (Normal) Collected: 04/18/24 1407    Specimen: Blood Updated: 04/18/24 1419     Hemoglobin 13.8 g/dL     Urine Drug Screen - Urine, Clean Catch [[id number]]  (Abnormal) Collected: 04/18/24 1039    Specimen: Urine, Clean Catch Updated: 04/18/24 1145     THC, Screen, Urine Negative     Phencyclidine (PCP), Urine Negative     Cocaine Screen, Urine Negative     Methamphetamine, Ur Negative     Opiate Screen Positive     Amphetamine Screen, Urine Negative     Benzodiazepine Screen, Urine Negative     Tricyclic Antidepressants Screen Negative     Methadone Screen, Urine Negative     Barbiturates Screen, Urine Negative     Oxycodone Screen, Urine Negative     Buprenorphine, Screen, Urine Negative    Narrative:      Cutoff For Drugs Screened:    Amphetamines               500 ng/ml  Barbiturates               200 ng/ml  Benzodiazepines            150 ng/ml  Cocaine                    150 ng/ml  Methadone                  200 ng/ml  Opiates                     100 ng/ml  Phencyclidine               25 ng/ml  THC                         50 ng/ml  Methamphetamine            500 ng/ml  Tricyclic Antidepressants  300 ng/ml  Oxycodone                  100 ng/ml  Buprenorphine               10 ng/ml    The normal value for all drugs tested is negative. This report includes unconfirmed screening results, with the cutoff values listed, to be used for medical treatment purposes only.  Unconfirmed results must not be used for non-medical purposes such as employment or legal testing.  Clinical consideration should be applied to any drug of abuse test, particularly when unconfirmed results are used.      Fentanyl, Urine - Urine, Clean Catch [[id number]]  (Normal) Collected: 04/18/24 1039    Specimen: Urine, Clean Catch Updated: 04/18/24 1120     Fentanyl, Urine Negative    Narrative:      Negative Threshold:      Fentanyl 5 ng/mL     The normal value for the drug tested is negative. This report includes final unconfirmed screening results to be used for medical treatment purposes only. Unconfirmed results must not be used for non-medical purposes such as employment or legal testing. Clinical consideration should be applied to any drug of abuse test, particularly when unconfirmed results are used.           Urinalysis With Microscopic If Indicated (No Culture) - Urine, Clean Catch [[id number]]  (Normal) Collected: 04/18/24 1039    Specimen: Urine, Clean Catch Updated: 04/18/24 1106     Color, UA Yellow     Appearance, UA Clear     pH, UA 6.5     Specific Gravity, UA 1.019     Glucose, UA Negative     Ketones, UA Negative     Bilirubin, UA Negative     Blood, UA Negative     Protein, UA Negative     Leuk Esterase, UA Negative     Nitrite, UA Negative     Urobilinogen, UA 0.2 E.U./dL    Narrative:      Urine microscopic not indicated.          Imaging Results (Last 24 Hours)       ** No results found for the last 24 hours. **          Physician Progress Notes (last 24 hours)  Notes from  24 0833 through 24 0833   No notes of this type exist for this encounter.          Consult Notes (last 24 hours)        Leonor Graff MD at 24 1222               In Patient Consult      Date of Consultation: 2024  Patient Name: Americo Davis Jr.  MRN: [id number]  : 1975     Referring provider: Mark Edward MD    Primary care provider:  Tigist Wheatley APRN    Reason for consultation: Hematochezia    History of Present Illness: This is a 48-year-old male patient with gastroesophageal reflux disease.  Esophagitis, prior history of peptic ulcer disease, chronic deficiency anemia, long-term NSAID use, COPD, hypertension, hyperlipidemia, coronary artery disease, anxiety depression, was brought in emergency room this morning with a complaints of worsening abdominal pain and an episode of bright red rectal bleeding this morning    Patient just had a morphine for his abdominal pain and is very lethargic and drowsy unable to get any information from him.  His mom is at bedside and of history obtained.  She brought him to the emergency room this morning with worsening abdominal pain and episode of bright red rectal bleed this morning and associated with nausea vomiting without any blood in the vomitus..  He does have a ongoing nausea vomiting which is chronic.  Does have a chronic abdominal pain mostly mid and upper abdomen but has worsened in the last couple of weeks.  He also had bowel movement with small amount of red blood this morning.  There was no black stools.     He had a multiple EGDs done in the last 2 to 3 years time.  Patient has been taking ibuprofen, Aleve, Excedrin despite recommended to stop.  He continues to smoke.  He continues to drink excessive pops without changing his diet.  Seen in the office 2 weeks ago for the similar symptoms.  His prior EGDs revealed erosive esophagitis and also recurrent peptic ulcer disease.  Last EGD was  In January  "2024 that again revealed erosive gastropathy with a healed ulcer scars.   Patient's last colonoscopy was in 2020 and had polyps removed.  Present note.    In the emergency room today his lab work done revealed 26 creatinine of 0.8.  Glucose 126 otherwise rest of the CMP was normal.  Lipase was borderline elevated 82.  Hemoglobin much improved compared to 14.6 g/dL compared to to December value of 11.4.  Platelet count of 249,000.  Pelvis done without contrast this morning did not reveal any significant abnormalities except nonobstructing right renal stones    Subjective     Past Medical History:   Diagnosis Date    Anemia     Anxiety     Asthma     Back pain     Bleeding ulcer     Body piercing 10/06/2021    ears    CHF (congestive heart failure)     Reported by patient's wife    Chronic venous insufficiency     Colitis     Constipation     COPD (chronic obstructive pulmonary disease)     [place] or callus     Coronary artery disease     Depression     GERD (gastroesophageal reflux disease)     GI bleed     Gout     Headache     History of ASCVD (atherosclerotic cardiovascular disease)     History of blood transfusion     Spouse reported no reaction to transfuson     History of fracture 10/06/2021    Hx right pinky fracture - did require surgery    History of heart attack     Spouse reported \"2 light heart attacks at age 30\"     History of stomach ulcers     Hyperlipidemia     Hypertension     Migraine headache     Mild aortic valve sclerosis     Mild mitral insufficiency     Spouse reported MVP and that Dr Preciado said not sever enough to warrant surgery    On home oxygen therapy     Spouse reported at 2L/min NC prn    Poor historian 10/06/2021    Snores     Tattoos     Vision problems     Weight loss     Recent Weight Loss Involuntary For Over A Year Or More       Past Surgical History:   Procedure Laterality Date    APPENDECTOMY      COLONOSCOPY      ENDOSCOPY      ENDOSCOPY N/A 11/5/2021    Procedure: " ESOPHAGOGASTRODUODENOSCOPY WITH BIOPSY;  Surgeon: Leonor Graff MD;  Location: [facility] ENDOSCOPY;  Service: Gastroenterology;  Laterality: N/A;    ENDOSCOPY N/A 1/19/2023    Procedure: ESOPHAGOGASTRODUODENOSCOPY WITH BIOPSIES;  Surgeon: Leonor Graff MD;  Location: [facility] ENDOSCOPY;  Service: Gastroenterology;  Laterality: N/A;    FRACTURE SURGERY Right     Pinky finger    WISDOM TOOTH EXTRACTION         Family History   Problem Relation Age of Onset    Arthritis Mother     Thyroid disease Mother     Hypertension Mother     Migraines Sister     Thyroid disease Sister     Hypertension Sister     Mental illness Brother     Cancer Paternal Uncle     Arthritis Maternal Grandmother     Stroke Maternal Grandmother     Colon cancer Neg Hx     Cirrhosis Neg Hx     Liver cancer Neg Hx     Liver disease Neg Hx        Social History     Socioeconomic History    Marital status:    Tobacco Use    Smoking status: Every Day     Current packs/day: 0.50     Average packs/day: 0.5 packs/day for 33.3 years (16.6 ttl pk-yrs)     Types: Cigarettes     Start date: 1991     Passive exposure: Current    Smokeless tobacco: Current     Types: Snuff   Vaping Use    Vaping status: Some Days    Substances: Nicotine (Spouse reported no use in 2 years), Flavoring    Devices: Disposable   Substance and Sexual Activity    Alcohol use: Not Currently     Comment: social    Drug use: Not Currently     Comment: opiates, snort    Sexual activity: Defer         Current Facility-Administered Medications:     morphine injection 4 mg, 4 mg, Intravenous, Q4H PRN, Ashvin Kelley DO, 4 mg at 04/18/24 0911    pantoprazole (PROTONIX) 40 mg in 100mL NS IVPB, 8 mg/hr, Intravenous, Continuous, Mark Edward MD, Stopped at 04/18/24 1156    sodium chloride 0.9 % flush 10 mL, 10 mL, Intravenous, PRN, Mark Edward MD    sodium chloride 0.9 % infusion, 250 mL/hr, Intravenous, Continuous, Mark Edward MD, Stopped at 04/18/24  "1037    Current Outpatient Medications:     buprenorphine-naloxone (SUBOXONE) 8-2 MG per SL tablet, Place 1.5 tablets under the tongue Daily., Disp: , Rfl:     cyclobenzaprine (FLEXERIL) 10 MG tablet, , Disp: , Rfl:     esomeprazole (nexIUM) 40 MG capsule, Take 1 capsule by mouth 2 (Two) Times a Day., Disp: 28 capsule, Rfl: 0    hydroCHLOROthiazide 12.5 MG tablet, Take 1 tablet by mouth Daily., Disp: , Rfl:     hydrOXYzine (ATARAX) 50 MG tablet, TAKE 1 TABLET BY MOUTH THREE TIMES DAILY AS NEEDED FOR ANXIETY OR SLEEP, Disp: 90 tablet, Rfl: 0    losartan (COZAAR) 50 MG tablet, , Disp: , Rfl:     metoprolol succinate XL (TOPROL-XL) 25 MG 24 hr tablet, Take 1 tablet by mouth Daily., Disp: , Rfl:     naloxone (NARCAN) 4 MG/0.1ML nasal spray, 1 spray into the nostril(s) as directed by provider As Needed (.). use for oversedation and call 911, Disp: 1 each, Rfl: 2    ondansetron (Zofran) 4 MG tablet, Take 1 tablet by mouth Every 8 (Eight) Hours As Needed for Nausea., Disp: 30 tablet, Rfl: 1    PARoxetine (PAXIL) 20 MG tablet, Take 1 tablet by mouth Daily. (Patient not taking: Reported on 4/11/2024), Disp: 30 tablet, Rfl: 0    senna 8.6 MG tablet, Take 2 tablets by mouth Every Night., Disp: 60 tablet, Rfl: 5    sucralfate (Carafate) 1 g tablet, Take 1 tablet by mouth 4 (Four) Times a Day for 10 days. Indications: Stomach Ulcer, Disp: 40 tablet, Rfl: 0    Allergies   Allergen Reactions    Sulfa Antibiotics Other (See Comments)     \"causes his insides to burn and his skin gets really red\" reported by patient's spouse        Review of Systems   Constitutional:  Negative for appetite change, fatigue, fever and unexpected weight loss.   HENT:  Negative for trouble swallowing.    Gastrointestinal:  Positive for abdominal pain, nausea and vomiting. Negative for abdominal distention, anal bleeding, blood in stool, constipation, diarrhea, rectal pain, GERD and indigestion.        The following portions of the patient's history were " reviewed and updated as appropriate: allergies, current medications, past family history, past medical history, past social history, past surgical history and problem list.    Objective     Vitals:    04/18/24 1110 04/18/24 1115 04/18/24 1130 04/18/24 1131   BP:   (!) 155/108    BP Location:       Patient Position:       Pulse: 74 73 74 74   Resp:       Temp:       TempSrc:       SpO2: 98% 98% 98% 98%   Weight:       Height:           Physical Exam  Constitutional:       Comments: Patient currently very lethargic following morphine   HENT:      Head: Normocephalic and atraumatic.   Eyes:      Conjunctiva/sclera: Conjunctivae normal.   Abdominal:      General: Abdomen is flat. There is no distension.      Palpations: There is no mass.      Tenderness: There is abdominal tenderness (Diffuse abdominal tenderness). There is no guarding or rebound.      Hernia: No hernia is present.   Musculoskeletal:      Cervical back: Normal range of motion and neck supple.         Results from last 7 days   Lab Units 04/18/24  0659   SODIUM mmol/L 140   POTASSIUM mmol/L 4.5   CHLORIDE mmol/L 103   CO2 mmol/L 21.9*   BUN mg/dL 26*   CREATININE mg/dL 0.88   CALCIUM mg/dL 10.0   ALBUMIN g/dL 4.2   BILIRUBIN mg/dL <0.2   ALK PHOS U/L 77   ALT (SGPT) U/L 9   AST (SGOT) U/L 12   GLUCOSE mg/dL 126*   WBC 10*3/mm3 8.65   HEMOGLOBIN g/dL 14.6   PLATELETS 10*3/mm3 249       Imaging Results (Last 24 Hours)       Procedure Component Value Units Date/Time    CT Abdomen Pelvis Without Contrast [[id number]] Collected: 04/18/24 0756     Updated: 04/18/24 0802    Narrative:      PROCEDURE: CT ABDOMEN PELVIS WO CONTRAST-     HISTORY: GI bleed, concern for perf; R10.9-Unspecified abdominal pain;  G89.29-Other chronic pain; K27.9-Peptic ulcer, site unspecified,  unspecified as acute or chronic, without hemorrhage or perforation     COMPARISON: 11/28/2023     Note: Noncontrast exam is less sensitive for assessment of the bowel and  solid abdominal  organs     TECHNIQUE: Noncontrast exam      CT ABDOMEN: Nonobstructing right renal stones are seen within the  calyces largest measuring up to 6 mm similar to prior exam. Otherwise  solid organs are grossly unremarkable. The bowel shows no evidence of  obstruction. There is no free air or free fluid within the abdomen.  Gallbladder is normal. Mild fecal impaction of the colon is present. No  gross bowel wall thickening is appreciated.     CT PELVIS: No bowel dilatation is seen. There is no free fluid. Appendix  is nonvisualized. No secondary findings of appendicitis are seen.  Prostate is minimally enlarged..       Impression:      1. No obvious inflammatory changes or bowel wall thickening.  2. No evidence of bowel obstruction  3. Nonobstructing right renal stones     This study was performed with techniques to keep radiation doses as low  as reasonably achievable (ALARA). Individualized dose reduction  techniques using automated exposure control or adjustment of vA and/or  kV according to the patient size were employed.      This report was signed and finalized on 4/18/2024 8:00 AM by Aly Tong MD.             EGD dated 11/5/2021 per Dr. Graff  - Normal oropharynx.  - Z-line irregular, 40 cm from the incisors.  - LA Grade A reflux esophagitis with no bleeding.  - Esophageal small cyst was found.  - Erosive gastropathy with stigmata of recent bleeding. Biopsied.  - Atrophic, discolored and texture changed mucosa in the antrum. Biopsied.  - Multiple healed ulcer scar in the gastric body, in the incisura and in the gastric antrum.  - Normal duodenal bulb, first portion of the duodenum, second portion of the duodenum and third portion of the duodenum.  - Anemia improved, no current bleeding  -Gastric biopsy with mild foveolar hyperplasia and PPI effect.  No H. pylori.  Gastric antrum biopsy of abnormal mucosa with reactive gastropathy, no H. pylori.     EGD dated 1/19/2023 per Dr. Graff  - Hilda  oropharynx.  - Z-line irregular, 40 cm from the incisors.  - [place]-colored mucosa suspicious for short-segment Mg's esophagus. Biopsied.  - Erosive gastropathy with stigmata of recent bleeding. Biopsied.  - Non-bleeding gastric ulcers with no stigmata of bleeding.  - Normal duodenal bulb, first portion of the duodenum, second portion of the duodenum and third portion of the duodenum.  - Findings suggestive of NSAID induced gastropathy, PUD  A.   ANTRUM AND BODY, BIOPSY:   Reactive gastropathy.   Negative for intestinal metaplasia or dysplasia.   No H. pylori-like organisms identified on H&E.   B.   GE JUNCTION:   Reactive gastric mucosa; negative for intestinal metaplasia, dysplasia and   malignancy.   No squamous esophageal mucosa is identified.     EGD 1/19/2024  - Normal oropharynx.   - Z-line irregular, 40 cm from the incisors.   - [place]-colored mucosa suspicious for short-segment Mg's esophagus. Biopsied.   - Erosive gastropathy with stigmata of recent bleeding. Biopsied.   - Non-bleeding gastric ulcers with no stigmata of bleeding.   - Normal duodenal bulb, first portion of the duodenum, second portion of the duodenum and third portion of the duodenum.   - Findings sugestive of NSAID induced gastropathy, PUD     DIAGNOSIS:  A. ANTRUM AND BODY, BIOPSY:  Reactive gastropathy.  Negative for intestinal metaplasia or dysplasia.  No H. pylori-like organisms identified on H&E.  B. GE JUNCTION:  Reactive gastric mucosa; negative for intestinal metaplasia, dysplasia and malignancy.  No squamous esophageal mucosa is identified.    Assessment / Plan      Assessment/Recommendations:   1.  Hematochezia  2.  Suspected acute gastric erosion and peptic ulcer disease  3.  Chronic abdominal pain  4.  History of gastric ulcer with hemorrhage, unspecified chronicity  5.  History of erosive esophagitis  6.  Chronic functional nausea complicated by opioid use   7.  History of Iron deficiency anemia secondary to chronic  blood loss  8.  Long-term NSAID use  9. Personal history of colonic polyps  Patient has a significant history of NSAID use especially Excedrin almost daily.  He also takes Aleve intermittently.  He had multiple EGDs done in the past that showed erosive esophagitis or peptic ulcer disease.  He continues to smoke.  Continues to drink excessive pops without  working on lifestyle changes.  As per family he probably stopped his Suboxone recently.    His history this time is more suggestive of rectal outlet bleeding however he is extremely high risk for overt GI bleed.  History is more suggestive of acute gastric erosions peptic ulcer disease. CT abdomen pelvis with contrast on 11/28/2023 showed finding concerning for gastritis.  Nodular focus of soft tissue enhancement adjacent to the distal stomach representing possible enlarged lymph node.     Clinical examination today reveals a significant diffuse abdominal pain and tenderness.  CT abdomen full is done without contrast was unremarkable.  As the Protonix was not helping him that was changed to Nexium.  Not sure whether he feels the Nexium.  He had a course of sucralfate recently    He has been due for EGD colonoscopy as OP.  Will schedule that for tomorrow in AM.  Will keep him n.p.o. after midnight  Okay to have a clear liquids  GoLytely bowel prep this evening  Protonix IV twice daily  Will recommend further after EGD colonoscopy  Abstinence from smoking  Complete abstinence from NSAID use       Thank you very much for letting me participate in the care of this patient.  Please do not hesitate to call me if you have any questions.    Leonor Graff MD  [facility] [place]  4/18/2024  12:22 EDT    Please note that portions of this note may have been completed with a voice recognition program.     Electronically signed by Leonor Graff MD at 04/18/24 1957        Universal Safety Interventions

## 2024-04-19 NOTE — PROGRESS NOTES
[facility]    Name:  Americo Davis Jr.   Age:  48 y.o.  Sex:  male  :  1975  MRN:  [id number]   Visit Number:  [id number]  Admission Date:  2024  Date Of Service:  24  Primary Care Physician:  Tigist Wheatley APRN     LOS: 1 day :    Chief Complaint:      Abdominal pain    Subjective:    Patient evaluated post EGD.  Feeling well.  Saying he feels like he can eat.  Denies any discomfort, no further nausea or vomiting.    Hospital Course:    Patient is a chronically ill 48-year-old male with history of erosive esophagitis with bleeding gastric ulcer, iron deficiency anemia, opioid abuse on Suboxone, and hypertension.  Patient presents from home with his mother for continued abdominal pain, nausea/vomiting.  Also notes bright red blood per rectum.  Patient follows with Dr. Barbara Mata.  Was seen in the office 2024 for complaints of abdominal pain with nausea/vomiting.  At the time it is noted that patient is continuing to take NSAIDs despite counseling.  Will take Aleve and ibuprofen nearly daily for chronic joint pain.  Patient again underwent counseling, changed Protonix to Nexium 40 mg daily.  He was also prescribed Carafate.  EGD was scheduled for which patient did not follow-up.  Most recent EGD performed 2024 which showed persistent erosive gastropathy.  Continues to smoke 1 pack/day.     Review of Systems:     All systems were reviewed and negative except as mentioned in subjective, assessment and plan.    Vital Signs:    Temp:  [97.3 °F (36.3 °C)-99.3 °F (37.4 °C)] 97.9 °F (36.6 °C)  Heart Rate:  [62-87] 76  Resp:  [14-18] 18  BP: ()/(45-88) 121/82    Intake and output:    I/O last 3 completed shifts:  In: 100 [I.V.:100]  Out: -   I/O this shift:  In: 300 [I.V.:300]  Out: -     Physical Examination:    General Appearance:  Alert and cooperative.    Head:  Atraumatic and normocephalic.   Eyes: Conjunctivae and sclerae  "normal, no icterus. No pallor.   Throat: No oral lesions, no thrush, oral mucosa moist.   Neck: Supple, trachea midline, no thyromegaly.   Lungs:   Breath sounds heard bilaterally equally.  No wheezing or crackles. No Pleural rub or bronchial breathing.   Heart:  Normal S1 and S2, no murmur, no gallop, no rub. No JVD.   Abdomen:   Normal bowel sounds, no masses, no organomegaly. Soft, nontender, nondistended, no rebound tenderness.   Extremities: Supple, no edema, no cyanosis, no clubbing.   Skin: No bleeding or rash.   Neurologic: Alert and oriented x 3. No facial asymmetry. Moves all four limbs. No tremors.      Laboratory results:    Results from last 7 days   Lab Units 04/19/24  0541 04/18/24  0659   SODIUM mmol/L 136 140   POTASSIUM mmol/L 3.8 4.5   CHLORIDE mmol/L 103 103   CO2 mmol/L 23.2 21.9*   BUN mg/dL 15 26*   CREATININE mg/dL 0.77 0.88   CALCIUM mg/dL 8.8 10.0   BILIRUBIN mg/dL  --  <0.2   ALK PHOS U/L  --  77   ALT (SGPT) U/L  --  9   AST (SGOT) U/L  --  12   GLUCOSE mg/dL 88 126*     Results from last 7 days   Lab Units 04/19/24  1238 04/19/24  0541 04/19/24  0005 04/18/24  1407 04/18/24  0659   WBC 10*3/mm3  --  8.26  --   --  8.65   HEMOGLOBIN g/dL 11.6* 12.2* 12.2*   < > 14.6   HEMATOCRIT %  --  37.4*  --   --  43.7   PLATELETS 10*3/mm3  --  221  --   --  249    < > = values in this interval not displayed.                 No results for input(s): \"PHART\", \"UMY5YTA\", \"PO2ART\", \"IAL5NUZ\", \"BASEEXCESS\" in the last 8760 hours.   I have reviewed the patient's laboratory results.    Radiology results:    CT Abdomen Pelvis Without Contrast    Result Date: 4/18/2024  PROCEDURE: CT ABDOMEN PELVIS WO CONTRAST-  HISTORY: GI bleed, concern for perf; R10.9-Unspecified abdominal pain; G89.29-Other chronic pain; K27.9-Peptic ulcer, site unspecified, unspecified as acute or chronic, without hemorrhage or perforation  COMPARISON: 11/28/2023  Note: Noncontrast exam is less sensitive for assessment of the bowel and " solid abdominal organs  TECHNIQUE: Noncontrast exam  CT ABDOMEN: Nonobstructing right renal stones are seen within the calyces largest measuring up to 6 mm similar to prior exam. Otherwise solid organs are grossly unremarkable. The bowel shows no evidence of obstruction. There is no free air or free fluid within the abdomen. Gallbladder is normal. Mild fecal impaction of the colon is present. No gross bowel wall thickening is appreciated.  CT PELVIS: No bowel dilatation is seen. There is no free fluid. Appendix is nonvisualized. No secondary findings of appendicitis are seen. Prostate is minimally enlarged..      Impression: 1. No obvious inflammatory changes or bowel wall thickening. 2. No evidence of bowel obstruction 3. Nonobstructing right renal stones  This study was performed with techniques to keep radiation doses as low as reasonably achievable (ALARA). Individualized dose reduction techniques using automated exposure control or adjustment of vA and/or kV according to the patient size were employed.  This report was signed and finalized on 4/18/2024 8:00 AM by Aly Tong MD.     I have reviewed the patient's radiology reports.    Medication Review:     I have reviewed the patient's active and prn medications.     Problem List:      GIB (gastrointestinal bleeding)    PUD (peptic ulcer disease)    Hematochezia    Upper abdominal pain    Nausea    GI bleed      Assessment:    Bright red blood per rectum, GI bleed POA  History of gastric ulcer with hemorrhage  History of erosive gastropathy  Frequent NSAID use  Iron deficiency anemia  Hypertension  CAD  Depression/anxiety  Tobacco abuse    Plan:    GI bleed  -GI consult  -Patient refused to complete prep for colonoscopy.  -EGD performed confirming deep cratered ulcer with high risk for perforation.  -Continue Protonix drip transition to oral Protonix tomorrow at discharge  - against NSAID use  -Monitor H&H, no indication for PRBC  transfusion  -Supportive care    Tobacco abuse  -Nicotine patch  -Counseled on cessation     Nicotine patch.  Further orders as clinical course dictates.    DVT Prophylaxis: SCDs  Code Status: Full  Diet: GI, low irritant  Discharge Plan: Home tomorrow    Ashvin Kelley DO  04/19/24  16:06 EDT    Dictated utilizing Dragon dictation.

## 2024-04-19 NOTE — ANESTHESIA POSTPROCEDURE EVALUATION
Patient: Americo Davis Jr.    Procedure Summary       Date: 04/19/24 Room / Location: [facility] ENDOSCOPY 2 / [facility] ENDOSCOPY    Anesthesia Start: 1325 Anesthesia Stop: 1348    Procedure: ESOPHAGOGASTRODUODENOSCOPY with biopsy (Esophagus) Diagnosis:       PUD (peptic ulcer disease)      Hematochezia      Upper abdominal pain      Nausea      (PUD (peptic ulcer disease) [K27.9])      (Hematochezia [K92.1])      (Upper abdominal pain [R10.10])      (Nausea [R11.0])    Surgeons: Leonor Graff MD Provider: Fan Perez CRNA    Anesthesia Type: MAC ASA Status: 3            Anesthesia Type: MAC    Vitals  Vitals Value Taken Time   /76 04/19/24 1446   Temp 98 °F (36.7 °C) 04/19/24 1446   Pulse 80 04/19/24 1511   Resp 18 04/19/24 1446   SpO2 94 % 04/19/24 1446   Vitals shown include unfiled device data.          Post Anesthesia Care and Evaluation    Patient location during evaluation: PACU  Patient participation: complete - patient participated  Level of consciousness: awake  Pain score: 1  Pain management: adequate    Airway patency: patent  Anesthetic complications: No anesthetic complications  PONV Status: controlled  Cardiovascular status: acceptable and stable  Respiratory status: acceptable  Hydration status: acceptable    Comments: See Nursing record for post procedural Vital Signs as per protocol.

## 2024-04-20 ENCOUNTER — READMISSION MANAGEMENT (OUTPATIENT)
Dept: CALL CENTER | Facility: HOSPITAL | Age: 49
End: 2024-04-20
Payer: MEDICAID

## 2024-04-20 VITALS
TEMPERATURE: 97.3 F | HEART RATE: 63 BPM | DIASTOLIC BLOOD PRESSURE: 54 MMHG | HEIGHT: 70 IN | OXYGEN SATURATION: 93 % | SYSTOLIC BLOOD PRESSURE: 89 MMHG | BODY MASS INDEX: 22.85 KG/M2 | RESPIRATION RATE: 16 BRPM | WEIGHT: 159.61 LBS

## 2024-04-20 LAB
ANION GAP SERPL CALCULATED.3IONS-SCNC: 7.7 MMOL/L (ref 5–15)
BUN SERPL-MCNC: 14 MG/DL (ref 6–20)
BUN/CREAT SERPL: 19.4 (ref 7–25)
CALCIUM SPEC-SCNC: 8.5 MG/DL (ref 8.6–10.5)
CHLORIDE SERPL-SCNC: 105 MMOL/L (ref 98–107)
CO2 SERPL-SCNC: 24.3 MMOL/L (ref 22–29)
CREAT SERPL-MCNC: 0.72 MG/DL (ref 0.76–1.27)
DEPRECATED RDW RBC AUTO: 46.4 FL (ref 37–54)
EGFRCR SERPLBLD CKD-EPI 2021: 112 ML/MIN/1.73
ERYTHROCYTE [DISTWIDTH] IN BLOOD BY AUTOMATED COUNT: 13.9 % (ref 12.3–15.4)
GLUCOSE SERPL-MCNC: 85 MG/DL (ref 65–99)
HCT VFR BLD AUTO: 33.7 % (ref 37.5–51)
HGB BLD-MCNC: 10.9 G/DL (ref 13–17.7)
MCH RBC QN AUTO: 29.2 PG (ref 26.6–33)
MCHC RBC AUTO-ENTMCNC: 32.3 G/DL (ref 31.5–35.7)
MCV RBC AUTO: 90.3 FL (ref 79–97)
PLATELET # BLD AUTO: 175 10*3/MM3 (ref 140–450)
PMV BLD AUTO: 9.5 FL (ref 6–12)
POTASSIUM SERPL-SCNC: 4.1 MMOL/L (ref 3.5–5.2)
RBC # BLD AUTO: 3.73 10*6/MM3 (ref 4.14–5.8)
SODIUM SERPL-SCNC: 137 MMOL/L (ref 136–145)
WBC NRBC COR # BLD AUTO: 5.73 10*3/MM3 (ref 3.4–10.8)

## 2024-04-20 PROCEDURE — 80048 BASIC METABOLIC PNL TOTAL CA: CPT | Performed by: INTERNAL MEDICINE

## 2024-04-20 PROCEDURE — 99239 HOSP IP/OBS DSCHRG MGMT >30: CPT | Performed by: INTERNAL MEDICINE

## 2024-04-20 PROCEDURE — 85027 COMPLETE CBC AUTOMATED: CPT | Performed by: INTERNAL MEDICINE

## 2024-04-20 PROCEDURE — 25810000003 SODIUM CHLORIDE 0.9 % SOLUTION: Performed by: INTERNAL MEDICINE

## 2024-04-20 RX ADMIN — SODIUM CHLORIDE 100 ML/HR: 9 INJECTION, SOLUTION INTRAVENOUS at 09:26

## 2024-04-20 RX ADMIN — Medication 1 PATCH: at 09:29

## 2024-04-20 RX ADMIN — BUPRENORPHINE AND NALOXONE 1 FILM: 8; 2 FILM, SOLUBLE BUCCAL; SUBLINGUAL at 09:27

## 2024-04-20 RX ADMIN — PANTOPRAZOLE SODIUM 8 MG/HR: 40 INJECTION, POWDER, FOR SOLUTION INTRAVENOUS at 09:26

## 2024-04-20 NOTE — PLAN OF CARE
Goal Outcome Evaluation:  Plan of Care Reviewed With: patient        Progress: no change  Outcome Evaluation: VSS. RA. no acute overnight events

## 2024-04-20 NOTE — DISCHARGE SUMMARY
[facility]   DISCHARGE SUMMARY      Name:  Americo Davis Jr.   Age:  48 y.o.  Sex:  male  :  1975  MRN:  [id number]   Visit Number:  [id number]    Admission Date:  2024  Date of Discharge:  2024  Primary Care Physician:  Tigist Wheatley APRN      Discharge Diagnoses:     Bright red blood per rectum, GI bleed POA  History of gastric ulcer with hemorrhage  History of erosive gastropathy  Frequent NSAID use  Iron deficiency anemia  Hypertension  CAD  Depression/anxiety  Tobacco abuse    Problem List:     Active Hospital Problems    Diagnosis  POA    **GIB (gastrointestinal bleeding) [K92.2]  Yes    Hematochezia [K92.1]  Unknown    Upper abdominal pain [R10.10]  Unknown    Nausea [R11.0]  Unknown    GI bleed [K92.2]  Yes    PUD (peptic ulcer disease) [K27.9]  Unknown      Resolved Hospital Problems   No resolved problems to display.     Presenting Problem:    Chief Complaint   Patient presents with    Abdominal Pain      Consults:     Consulting Physician(s)         Provider   Role Specialty     Leonor Graff MD      Consulting Physician Gastroenterology          Procedures Performed:    Procedure(s):  ESOPHAGOGASTRODUODENOSCOPY with biopsy    History of presenting illness/Hospital Course:    Patient is a chronically ill 48-year-old male with history of erosive esophagitis with bleeding gastric ulcer, iron deficiency anemia, opioid abuse on Suboxone, and hypertension.  Patient presents from home with his mother for continued abdominal pain, nausea/vomiting.  Also notes bright red blood per rectum.  Patient follows with Dr. Barbara Mata.  Was seen in the office 2024 for complaints of abdominal pain with nausea/vomiting.  At the time it is noted that patient is continuing to take NSAIDs despite counseling.  Will take Aleve and ibuprofen nearly daily for chronic joint pain.  Patient again underwent counseling, changed Protonix to Nexium 40 mg daily.   He was also prescribed Carafate.  EGD was scheduled for which patient did not follow-up.  Most recent EGD performed 1/19/2024 which showed persistent erosive gastropathy.  Continues to smoke 1 pack/day.  Patient also continues to use NSAIDs daily.  GI bleed  -GI consult  -Patient refused to complete prep for colonoscopy.  -EGD performed confirming deep cratered ulcer with high risk for perforation.  -Continue Protonix drip transition to oral Protonix at discharge  - against NSAID use  -Stable H&H, no indication for PRBC transfusion    Tobacco abuse  -Nicotine patch  -Counseled on cessation    Vital Signs:    Temp:  [97.3 °F (36.3 °C)-98.4 °F (36.9 °C)] 97.3 °F (36.3 °C)  Heart Rate:  [63-87] 63  Resp:  [16-18] 16  BP: ()/(45-88) 89/54    Physical Exam:    General Appearance:  Alert and cooperative.    Head:  Atraumatic and normocephalic.   Eyes: Conjunctivae and sclerae normal, no icterus. No pallor.   Ears:  Ears with no abnormalities noted.   Throat: No oral lesions, no thrush, oral mucosa moist.   Neck: Supple, trachea midline, no thyromegaly.   Back:   No kyphoscoliosis present. No tenderness to palpation.   Lungs:   Breath sounds heard bilaterally equally.  No crackles or wheezing. No Pleural rub or bronchial breathing.   Heart:  Normal S1 and S2, no murmur, no gallop, no rub. No JVD.   Abdomen:   Normal bowel sounds, no masses, no organomegaly. Soft, nontender, nondistended, no rebound tenderness.   Extremities: Supple, no edema, no cyanosis, no clubbing.   Pulses: Pulses palpable bilaterally.   Skin: No bleeding or rash.   Neurologic: Alert and oriented x 3. No facial asymmetry. Moves all four limbs. No tremors.     Pertinent Lab Results:     Results from last 7 days   Lab Units 04/20/24  0545 04/19/24  0541 04/18/24  0659   SODIUM mmol/L 137 136 140   POTASSIUM mmol/L 4.1 3.8 4.5   CHLORIDE mmol/L 105 103 103   CO2 mmol/L 24.3 23.2 21.9*   BUN mg/dL 14 15 26*   CREATININE mg/dL 0.72* 0.77 0.88    CALCIUM mg/dL 8.5* 8.8 10.0   BILIRUBIN mg/dL  --   --  <0.2   ALK PHOS U/L  --   --  77   ALT (SGPT) U/L  --   --  9   AST (SGOT) U/L  --   --  12   GLUCOSE mg/dL 85 88 126*     Results from last 7 days   Lab Units 04/20/24  0545 04/19/24  1238 04/19/24  0541 04/18/24  1407 04/18/24  0659   WBC 10*3/mm3 5.73  --  8.26  --  8.65   HEMOGLOBIN g/dL 10.9* 11.6* 12.2*   < > 14.6   HEMATOCRIT % 33.7*  --  37.4*  --  43.7   PLATELETS 10*3/mm3 175  --  221  --  249    < > = values in this interval not displayed.                     Results from last 7 days   Lab Units 04/18/24  0659   LIPASE U/L 82*               Pertinent Radiology Results:    Imaging Results (All)       Procedure Component Value Units Date/Time    CT Abdomen Pelvis Without Contrast [[id number]] Collected: 04/18/24 0756     Updated: 04/18/24 0802    Narrative:      PROCEDURE: CT ABDOMEN PELVIS WO CONTRAST-     HISTORY: GI bleed, concern for perf; R10.9-Unspecified abdominal pain;  G89.29-Other chronic pain; K27.9-Peptic ulcer, site unspecified,  unspecified as acute or chronic, without hemorrhage or perforation     COMPARISON: 11/28/2023     Note: Noncontrast exam is less sensitive for assessment of the bowel and  solid abdominal organs     TECHNIQUE: Noncontrast exam      CT ABDOMEN: Nonobstructing right renal stones are seen within the  calyces largest measuring up to 6 mm similar to prior exam. Otherwise  solid organs are grossly unremarkable. The bowel shows no evidence of  obstruction. There is no free air or free fluid within the abdomen.  Gallbladder is normal. Mild fecal impaction of the colon is present. No  gross bowel wall thickening is appreciated.     CT PELVIS: No bowel dilatation is seen. There is no free fluid. Appendix  is nonvisualized. No secondary findings of appendicitis are seen.  Prostate is minimally enlarged..       Impression:      1. No obvious inflammatory changes or bowel wall thickening.  2. No evidence of bowel  obstruction  3. Nonobstructing right renal stones     This study was performed with techniques to keep radiation doses as low  as reasonably achievable (ALARA). Individualized dose reduction  techniques using automated exposure control or adjustment of vA and/or  kV according to the patient size were employed.      This report was signed and finalized on 2024 8:00 AM by Aly Tong MD.               Echo:    Results for orders placed in visit on 14    CONVERTED (HISTORICAL) ECHO    Narrative  Patient:      TAYLOR OHARA  [facility] Rec#:     [id number]               :          1975  Date:         2014            Age:          38y  Height:       152.4 cm / 60.0 in  Weight:       74.84 kg / 164.9 lbs  Sex:          M                     BSA:          1.72  Room#:        [facility]    Sonographer:  Olrin Darden PITA  Referring:    CHRISTIANO  Reading:      JOSE GUADALUPE Can MD  Primary:      Scar Serrato MD  ______________________________________________________________________    Transthoracic Echocardiogram    Indication:  Chest Pain, SOA  BP:           140/84  HR:           76    Conclusions  1. The estimated ejection fraction is 55-60%.  Global left ventricular  wall motion and contractility are within normal limits.  2. The right ventricular systolic pressure is calculated at 23 mmHg.    Findings  Technical Comments:  The study quality is good.    Left Ventricle:  The left ventricular chamber size is normal. Global left ventricular  wall motion and contractility are within normal limits. Left ventricular  systolic function is at the lower limits of normal.  The estimated  ejection fraction is 55-60%.    Left Atrium:  The left atrial chamber size is normal.    Right Ventricle:  The right ventricular cavity size is normal. The right ventricular  global systolic function is normal.    Right Atrium:  The right atrial cavity size is normal. No atrial septal defect is  visualized.    Aortic Valve:  The  aortic valve is trileaflet. There is no evidence of aortic  regurgitation. There is no evidence of aortic stenosis.    Mitral Valve:  The mitral valve leaflets appear normal. There is no evidence of mitral  valve prolapse. There is a trace of mitral regurgitation. There is no  evidence of mitral stenosis.  Tricuspid Valve:  The tricuspid valve leaflets are normal.  There is mild tricuspid  regurgitation. The right ventricular systolic pressure is calculated at  23 mmHg.    Pulmonic Valve:  The pulmonic valve appears normal.    Pericardium:  There is no evidence of pericardial effusion.    Aorta:  There is no dilatation of the aortic root.    Pulmonary Artery:  The main pulmonary artery appears normal.    Venous:  The venous system appears normal.    Measurements  Chambers  Name                    Value  RVIDd (AP) 2D           3.03 cm  IVSd (2D)               0.88 cm  LVIDd (2D)              5.57 cm  LVIDs (2D)              3.96 cm  LVPWd (2D)              0.9 cm  EF (2D)                 55.1 %  Ao root diameter (2D)   3.1 cm  LA dimension (AP) 2D    3.2 cm  LA:Ao ratio (2D)        1.03 ratio    Diastolic/Systolic Function  Name                    Value  LV lateral e' Vmax      0.21 m/sec    Tricuspid Valve  Name                    Value  TR Vmax                 1.77 m/sec  TR peak gradient        13 mmHg  RVSP                    23 mmHg    Electronically signed by: JOSE GUADALUPE Can MD on 02/12/2014 18:20:06    Condition on Discharge:      Stable.    Code status during the hospital stay:    Code Status and Medical Interventions:   Ordered at: 04/18/24 0946     Code Status (Patient has no pulse and is not breathing):    CPR (Attempt to Resuscitate)     Medical Interventions (Patient has pulse or is breathing):    Full Support     Discharge Disposition:    Home or Self Care    Discharge Medications:       Discharge Medications        New Medications        Instructions Start Date   Nicotine Step 1 21 MG/24HR  patch  Generic drug: nicotine   1 patch, Transdermal, Every 24 Hours Scheduled      pantoprazole 40 MG EC tablet  Commonly known as: Protonix   40 mg, Oral, 2 Times Daily             Continue These Medications        Instructions Start Date   buprenorphine-naloxone 8-2 MG per SL tablet  Commonly known as: SUBOXONE   1.5 tablets, Sublingual, Daily      hydroCHLOROthiazide 12.5 MG tablet   1 tablet, Oral, Daily      hydrOXYzine 50 MG tablet  Commonly known as: ATARAX   TAKE 1 TABLET BY MOUTH THREE TIMES DAILY AS NEEDED FOR ANXIETY OR SLEEP      losartan 50 MG tablet  Commonly known as: COZAAR   No dose, route, or frequency recorded.      metoprolol succinate XL 25 MG 24 hr tablet  Commonly known as: TOPROL-XL   25 mg, Oral, Daily      naloxone 4 MG/0.1ML nasal spray  Commonly known as: NARCAN   1 spray, Nasal, As Needed, use for oversedation and call 911      ondansetron 4 MG tablet  Commonly known as: Zofran   4 mg, Oral, Every 8 Hours PRN      senna 8.6 MG tablet  Commonly known as: SENOKOT   2 tablets, Oral, Nightly      sucralfate 1 g tablet  Commonly known as: Carafate   1 g, Oral, 4 Times Daily             Stop These Medications      cyclobenzaprine 10 MG tablet  Commonly known as: FLEXERIL     esomeprazole 40 MG capsule  Commonly known as: nexIUM     PARoxetine 20 MG tablet  Commonly known as: PAXIL            Discharge Diet:     Diet Instructions       Diet: Gastrointestinal Diets; Low Irritant, Fiber-Restricted; Regular (IDDSI 7); Thin (IDDSI 0)      Discharge Diet: Gastrointestinal Diets    Gastrointestinal Diet:  Low Irritant  Fiber-Restricted       Texture: Regular (IDDSI 7)    Fluid Consistency: Thin (IDDSI 0)          Activity at Discharge:     Activity Instructions       Activity as Tolerated            Follow-up Appointments:    Additional Instructions for the Follow-ups that You Need to Schedule       Discharge Follow-up with PCP   As directed       Currently Documented PCP:    Tigist Wheatley,  DENNY    PCP Phone Number:    [phone]     Follow Up Details: 1 week        Discharge Follow-up with Specified Provider: Dajuan; 6 Weeks   As directed      To: Dajuan   Follow Up: 6 Weeks               Follow-up Information       Ly Darden APRN. Go on 4/26/2024.    Specialty: Nurse Practitioner  Why: 02:20pm  Contact information:  [address]   [place] [address]  [phone]               Leonor Graff MD Follow up on 7/24/2024.    Specialty: Gastroenterology  Why: 03:15pm  Contact information:  [address]  [place] [address]  [phone]                           Future Appointments   Date Time Provider Department Center   7/24/2024  3:15 PM Leonor Graff MD [facility]     Test Results Pending at Discharge:    Pending Labs       Order Current Status    TISSUE EXAM, [facility] ([facility]) Collected (04/19/24 1341)               Ashvin Kelley DO  04/20/24  12:00 EDT    Time: I spent >30 minutes on this discharge activity which included: face-to-face encounter with the patient, reviewing the data in the system, coordination of the care with the nursing staff as well as consultants, documentation, and entering orders.     Dictated utilizing Dragon dictation.

## 2024-04-20 NOTE — OUTREACH NOTE
Prep Survey      Flowsheet Row Responses   [facility] facility patient discharged from? [place]   Is LACE score < 7 ? No   Eligibility Readm Mgmt   Discharge diagnosis *GIB (gastrointestinal bleeding   Does the patient have one of the following disease processes/diagnoses(primary or secondary)? Other   Does the patient have Home health ordered? No   Is there a DME ordered? No   Prep survey completed? Yes            KENDRICK ESTES - Registered Nurse

## 2024-04-20 NOTE — PROGRESS NOTES
Patient: Americo Usdallin Buckley  Procedure(s):  ESOPHAGOGASTRODUODENOSCOPY with biopsy  Anesthesia type: MAC    Patient location: [facility] Surgical Floor  Last vitals:   Vitals:    04/20/24 0727   BP: (!) 89/54   Pulse: 63   Resp: 16   Temp: 97.3 °F (36.3 °C)   SpO2: 93%     Level of consciousness: awake, alert, and oriented    Post-anesthesia pain: adequate analgesia  Airway patency: patent  Respiratory: unassisted  Cardiovascular: stable and blood pressure at baseline  Hydration: euvolemic    Anesthetic complications: no

## 2024-04-20 NOTE — CASE MANAGEMENT/SOCIAL WORK
Case Management Discharge Note      Final Note: Plans to DC home with his mother    Provided Post Acute Provider List?: N/A  N/A Provider List Comment: Patient plans to return home; no new DME needs  Provided Post Acute Provider Quality & Resource List?: N/A  N/A Quality & Resource List Comment: Patient plans to return home; no new DME needs    Selected Continued Care - Admitted Since 4/18/2024       Destination    No services have been selected for the patient.                Durable Medical Equipment    No services have been selected for the patient.                Dialysis/Infusion    No services have been selected for the patient.                Home Medical Care    No services have been selected for the patient.                Therapy    No services have been selected for the patient.                Community Resources    No services have been selected for the patient.                Community & DME    No services have been selected for the patient.                    Transportation Services  Private: Car    Final Discharge Disposition Code: 01 - home or self-care

## 2024-04-21 NOTE — PAYOR COMM NOTE
"To:  [facility]  From: Miracle Meadows RN  Phone: [phone]  Fax: [phone]  NPI: [id number]  TIN: [id number]  Member ID: [id number]   MRN: [id number]    Taylor Ohara Jr. (48 y.o. Male)       Date of Birth   1975    Social Security Number       Address   [address] [place], [place] [address]    Home Phone   [phone]    MRN   [id number]       Rastafari   None    Marital Status                               Admission Date   4/18/24    Admission Type   Emergency    Admitting Provider   Kerley, Brian Joseph, DO    Attending Provider       Department, Room/Bed   [facility] TELEMETRY 4, 426/1       Discharge Date   4/20/2024    Discharge Disposition   Home or Self Care    Discharge Destination                                 Attending Provider: (none)   Allergies: Sulfa Antibiotics    Isolation: None   Infection: None   Code Status: Prior    Ht: 177.8 cm (70\")   Wt: 72.4 kg (159 lb 9.8 oz)    Admission Cmt: None   Principal Problem: GIB (gastrointestinal bleeding) [K92.2]                   Active Insurance as of 4/18/2024       Primary Coverage       Payor Plan Insurance Group Employer/Plan Group    [facility] COMMUNITY PLAN [facility] COMMUNITY PLAN [facility] [place]       Payor Plan Address Payor Plan Phone Number Payor Plan Fax Number Effective Dates    [address]   3/1/2022 - None Entered    [facility] [phone]         Subscriber Name Subscriber Birth Date Member ID       TAYLOR OHARA JR. 1975 [id number]                     Emergency Contacts        (Rel.) Home Phone Work Phone Mobile Phone    RYDER DAMIAN (Mother) -- -- [phone]                 History & Physical        Leonor Graff MD at 04/19/24 1329            H&P reviewed. The patient was examined and there are no changes to the H&P.          Electronically signed by Leonor Graff MD at 04/19/24 1613   Source Note: H&P (View-Only)               Follow Up Note     Date: 04/11/2024   Patient " Name: Americo Davis Jr.  MRN: [id number]  : 1975     Referring Physician: Tigist Wheatley APRN    Chief Complaint:    Chief Complaint   Patient presents with    Gastric Ulcer    Peptic Ulcer Disease    Heartburn    Vomiting     Seen at ER recently         Interval History:   2024  Americo Davis Jr. is a 48 y.o. male who is here today for follow up for his ongoing abdominal pain nausea vomiting.  He states that he continues to have a worsening abdominal pain mostly in the upper abdomen and the left side abdomen with nausea and vomiting.  He could not stop his NSAIDs he is still taking ibuprofen Aleve and Excedrin as needed for his migraine headache.  He continues to smoke.  Continues to drink excessive pops.  He did not change any of his diet.  States that he had to come to emergency room in December and Protonix given is not working for him.    2021  He had an EGD at [facility] [place] in 2021 by Dr. Ricks and was told he had a bleeding ulcer (not sure of exact location). He was given a blood transfusion at that time and was placed on protonix 40 mg BID but insurance did not cover protonix. He has been taking prilosec 20 mg once daily otc. He has not been having heartburn unless he eats spicy foods. He denies any current abdominal pain. No melena. No rectal bleeding. Normal bowel movements brown in color, occurring every 2-3 days. Reports severe fatigue and relates this to his lung disease, still smoking and uses oxygen at night. He had labs last a couple of weeks ago, not sure of those results. Not currently taking any iron supplement.      Had colonoscopy last year in , had 1 colon polyp removed. He was told to have another colonoscopy in 5 years. He cannot recall the provider's name who completed this. There is no known family history of colon cancer or colon polyps.    Subjective      Past Medical History:   Past Medical History:   Diagnosis Date    Anemia     Anxiety      "Asthma     Back pain     Bleeding ulcer     Body piercing 10/06/2021    ears    CHF (congestive heart failure)     Reported by patient's wife    Chronic venous insufficiency     Colitis     Constipation     COPD (chronic obstructive pulmonary disease)     [place] or callus     Coronary artery disease     Depression     GERD (gastroesophageal reflux disease)     GI bleed     Gout     Headache     History of ASCVD (atherosclerotic cardiovascular disease)     History of blood transfusion     Spouse reported no reaction to transfuson     History of fracture 10/06/2021    Hx right pinky fracture - did require surgery    History of heart attack     Spouse reported \"2 light heart attacks at age 30\"     History of stomach ulcers     Hyperlipidemia     Hypertension     Migraine headache     Mild aortic valve sclerosis     Mild mitral insufficiency     Spouse reported MVP and that Dr Preciado said not sever enough to warrant surgery    On home oxygen therapy     Spouse reported at 2L/min NC prn    Poor historian 10/06/2021    Snores     Tattoos     Vision problems     Weight loss     Recent Weight Loss Involuntary For Over A Year Or More     Past Surgical History:   Past Surgical History:   Procedure Laterality Date    APPENDECTOMY      COLONOSCOPY      ENDOSCOPY      ENDOSCOPY N/A 11/5/2021    Procedure: ESOPHAGOGASTRODUODENOSCOPY WITH BIOPSY;  Surgeon: Leonor Graff MD;  Location: [facility] ENDOSCOPY;  Service: Gastroenterology;  Laterality: N/A;    ENDOSCOPY N/A 1/19/2023    Procedure: ESOPHAGOGASTRODUODENOSCOPY WITH BIOPSIES;  Surgeon: Leonor Graff MD;  Location: [facility] ENDOSCOPY;  Service: Gastroenterology;  Laterality: N/A;    FRACTURE SURGERY Right     Pinky finger    WISDOM TOOTH EXTRACTION         Family History:   Family History   Problem Relation Age of Onset    Arthritis Mother     Thyroid disease Mother     Hypertension Mother     Migraines Sister     Thyroid disease Sister     Hypertension Sister     " Mental illness Brother     Cancer Paternal Uncle     Arthritis Maternal Grandmother     Stroke Maternal Grandmother     Colon cancer Neg Hx     Cirrhosis Neg Hx     Liver cancer Neg Hx     Liver disease Neg Hx        Social History:   Social History     Socioeconomic History    Marital status:    Tobacco Use    Smoking status: Every Day     Current packs/day: 0.50     Average packs/day: 0.5 packs/day for 33.3 years (16.6 ttl pk-yrs)     Types: Cigarettes     Start date: 1991     Passive exposure: Current    Smokeless tobacco: Current     Types: Snuff   Vaping Use    Vaping status: Some Days    Substances: Nicotine (Spouse reported no use in 2 years), Flavoring    Devices: Disposable   Substance and Sexual Activity    Alcohol use: Not Currently     Comment: social    Drug use: Not Currently     Comment: opiates, snort    Sexual activity: Defer       Medications:     Current Outpatient Medications:     buprenorphine-naloxone (SUBOXONE) 8-2 MG per SL tablet, Place 1.5 tablets under the tongue Daily., Disp: , Rfl:     hydroCHLOROthiazide 12.5 MG tablet, Take 1 tablet by mouth Daily., Disp: , Rfl:     hydrOXYzine (ATARAX) 50 MG tablet, TAKE 1 TABLET BY MOUTH THREE TIMES DAILY AS NEEDED FOR ANXIETY OR SLEEP, Disp: 90 tablet, Rfl: 0    losartan (COZAAR) 50 MG tablet, , Disp: , Rfl:     metoprolol succinate XL (TOPROL-XL) 25 MG 24 hr tablet, Take 1 tablet by mouth Daily., Disp: , Rfl:     naloxone (NARCAN) 4 MG/0.1ML nasal spray, 1 spray into the nostril(s) as directed by provider As Needed (.). use for oversedation and call 911, Disp: 1 each, Rfl: 2    pantoprazole (PROTONIX) 40 MG EC tablet, Take 1 tablet by mouth Daily., Disp: 90 tablet, Rfl: 0    cyclobenzaprine (FLEXERIL) 10 MG tablet, , Disp: , Rfl:     PARoxetine (PAXIL) 20 MG tablet, Take 1 tablet by mouth Daily. (Patient not taking: Reported on 4/11/2024), Disp: 30 tablet, Rfl: 0    Allergies:   Allergies   Allergen Reactions    Sulfa Antibiotics Other (See  "Comments)     \"causes his insides to burn and his skin gets really red\" reported by patient's spouse        Review of Systems:   Review of Systems   Constitutional:  Negative for appetite change, fatigue, fever and unexpected weight loss.   HENT:  Negative for trouble swallowing.    Gastrointestinal:  Positive for abdominal pain, constipation, nausea, vomiting, GERD and indigestion. Negative for abdominal distention, anal bleeding, blood in stool, diarrhea and rectal pain.       The following portions of the patient's history were reviewed and updated as appropriate: allergies, current medications, past family history, past medical history, past social history, past surgical history and problem list.    Objective     Physical Exam:  Vital Signs:   Vitals:    04/11/24 1548   BP: 115/87   Pulse: 89   Temp: 96.6 °F (35.9 °C)   TempSrc: Infrared   Weight: 73.5 kg (162 lb)  Comment: with clothing/shoes   Height: 177.8 cm (70\")  Comment: per patient       Physical Exam  Constitutional:       Appearance: Normal appearance.   HENT:      Head: Normocephalic and atraumatic.   Eyes:      Conjunctiva/sclera: Conjunctivae normal.   Abdominal:      General: Abdomen is flat. There is no distension.      Palpations: There is no mass.      Tenderness: There is no abdominal tenderness (Minimal tenderness in the epigastric region umbilical region the left lower quadrant). There is no guarding or rebound.      Hernia: No hernia is present.   Musculoskeletal:      Cervical back: Normal range of motion and neck supple.   Neurological:      Mental Status: He is alert.         Results Review:   I reviewed the patient's new clinical results.    No visits with results within 90 Day(s) from this visit.   Latest known visit with results is:   Admission on 12/14/2023, Discharged on 12/14/2023   Component Date Value Ref Range Status    Color, UA 12/14/2023 Yellow  Yellow, Straw Final    Appearance, UA 12/14/2023 Cloudy (A)  Clear Final    pH, UA " 12/14/2023 7.5  5.0 - 8.0 Final    Specific Gravity, UA 12/14/2023 1.015  1.005 - 1.030 Final    Glucose, UA 12/14/2023 Negative  Negative Final    Ketones, UA 12/14/2023 Negative  Negative Final    Bilirubin, UA 12/14/2023 Negative  Negative Final    Blood, UA 12/14/2023 Negative  Negative Final    Protein, UA 12/14/2023 30 mg/dL (1+) (A)  Negative Final    Leuk Esterase, UA 12/14/2023 Large (3+) (A)  Negative Final    Nitrite, UA 12/14/2023 Positive (A)  Negative Final    Urobilinogen, UA 12/14/2023 0.2 E.U./dL  0.2 - 1.0 E.U./dL Final    RBC, UA 12/14/2023 None Seen  None Seen, 0-2 /HPF Final    WBC, UA 12/14/2023 11-20 (A)  None Seen, 0-2 /HPF Final    Bacteria, UA 12/14/2023 1+ (A)  None Seen /HPF Final    Squamous Epithelial Cells, UA 12/14/2023 7-12 (A)  None Seen, 0-2 /HPF Final    Hyaline Casts, UA 12/14/2023 None Seen  None Seen /LPF Final    Methodology 12/14/2023 Manual Light Microscopy   Final    WBC 12/14/2023 13.89 (H)  3.40 - 10.80 10*3/mm3 Final    RBC 12/14/2023 3.84 (L)  4.14 - 5.80 10*6/mm3 Final    Hemoglobin 12/14/2023 11.4 (L)  13.0 - 17.7 g/dL Final    Hematocrit 12/14/2023 34.8 (L)  37.5 - 51.0 % Final    MCV 12/14/2023 90.6  79.0 - 97.0 fL Final    MCH 12/14/2023 29.7  26.6 - 33.0 pg Final    MCHC 12/14/2023 32.8  31.5 - 35.7 g/dL Final    RDW 12/14/2023 14.7  12.3 - 15.4 % Final    RDW-SD 12/14/2023 49.1  37.0 - 54.0 fl Final    MPV 12/14/2023 9.2  6.0 - 12.0 fL Final    Platelets 12/14/2023 361  140 - 450 10*3/mm3 Final    Neutrophil % 12/14/2023 82.6 (H)  42.7 - 76.0 % Final    Lymphocyte % 12/14/2023 11.4 (L)  19.6 - 45.3 % Final    Monocyte % 12/14/2023 4.7 (L)  5.0 - 12.0 % Final    Eosinophil % 12/14/2023 0.2 (L)  0.3 - 6.2 % Final    Basophil % 12/14/2023 0.1  0.0 - 1.5 % Final    Immature Grans % 12/14/2023 1.0 (H)  0.0 - 0.5 % Final    Neutrophils, Absolute 12/14/2023 11.46 (H)  1.70 - 7.00 10*3/mm3 Final    Lymphocytes, Absolute 12/14/2023 1.59  0.70 - 3.10 10*3/mm3 Final     Monocytes, Absolute 12/14/2023 0.65  0.10 - 0.90 10*3/mm3 Final    Eosinophils, Absolute 12/14/2023 0.03  0.00 - 0.40 10*3/mm3 Final    Basophils, Absolute 12/14/2023 0.02  0.00 - 0.20 10*3/mm3 Final    Immature Grans, Absolute 12/14/2023 0.14 (H)  0.00 - 0.05 10*3/mm3 Final    nRBC 12/14/2023 0.0  0.0 - 0.2 /100 WBC Final    Glucose 12/14/2023 107 (H)  65 - 99 mg/dL Final    BUN 12/14/2023 13  6 - 20 mg/dL Final    Creatinine 12/14/2023 0.87  0.76 - 1.27 mg/dL Final    Sodium 12/14/2023 130 (L)  136 - 145 mmol/L Final    Potassium 12/14/2023 3.7  3.5 - 5.2 mmol/L Final    Chloride 12/14/2023 95 (L)  98 - 107 mmol/L Final    CO2 12/14/2023 23.6  22.0 - 29.0 mmol/L Final    Calcium 12/14/2023 9.4  8.6 - 10.5 mg/dL Final    Total Protein 12/14/2023 8.0  6.0 - 8.5 g/dL Final    Albumin 12/14/2023 3.7  3.5 - 5.2 g/dL Final    ALT (SGPT) 12/14/2023 10  1 - 41 U/L Final    AST (SGOT) 12/14/2023 12  1 - 40 U/L Final    Alkaline Phosphatase 12/14/2023 84  39 - 117 U/L Final    Total Bilirubin 12/14/2023 <0.2  0.0 - 1.2 mg/dL Final    Globulin 12/14/2023 4.3  gm/dL Final    A/G Ratio 12/14/2023 0.9  g/dL Final    BUN/Creatinine Ratio 12/14/2023 14.9  7.0 - 25.0 Final    Anion Gap 12/14/2023 11.4  5.0 - 15.0 mmol/L Final    eGFR 12/14/2023 105.8  >60.0 mL/min/1.73 Final      No radiology results for the last 90 days.      EGD dated 11/5/2021 per Dr. Graff  - Normal oropharynx.  - Z-line irregular, 40 cm from the incisors.  - LA Grade A reflux esophagitis with no bleeding.  - Esophageal small cyst was found.  - Erosive gastropathy with stigmata of recent bleeding. Biopsied.  - Atrophic, discolored and texture changed mucosa in the antrum. Biopsied.  - Multiple healed ulcer scar in the gastric body, in the incisura and in the gastric antrum.  - Normal duodenal bulb, first portion of the duodenum, second portion of the duodenum and third portion of the duodenum.  - Anemia improved, no current bleeding  -Gastric biopsy with  mild foveolar hyperplasia and PPI effect.  No H. pylori.  Gastric antrum biopsy of abnormal mucosa with reactive gastropathy, no H. pylori.     EGD dated 1/19/2023 per Dr. Graff  - Normal oropharynx.  - Z-line irregular, 40 cm from the incisors.  - [place]-colored mucosa suspicious for short-segment Mg's esophagus. Biopsied.  - Erosive gastropathy with stigmata of recent bleeding. Biopsied.  - Non-bleeding gastric ulcers with no stigmata of bleeding.  - Normal duodenal bulb, first portion of the duodenum, second portion of the duodenum and third portion of the duodenum.  - Findings suggestive of NSAID induced gastropathy, PUD  A.   ANTRUM AND BODY, BIOPSY:   Reactive gastropathy.   Negative for intestinal metaplasia or dysplasia.   No H. pylori-like organisms identified on H&E.   B.   GE JUNCTION:   Reactive gastric mucosa; negative for intestinal metaplasia, dysplasia and   malignancy.   No squamous esophageal mucosa is identified.    EGD 1/19/2024  - Normal oropharynx.   - Z-line irregular, 40 cm from the incisors.   - [place]-colored mucosa suspicious for short-segment Mg's esophagus. Biopsied.   - Erosive gastropathy with stigmata of recent bleeding. Biopsied.   - Non-bleeding gastric ulcers with no stigmata of bleeding.   - Normal duodenal bulb, first portion of the duodenum, second portion of the duodenum and third portion of the duodenum.   - Findings sugestive of NSAID induced gastropathy, PUD     DIAGNOSIS:  A. ANTRUM AND BODY, BIOPSY:  Reactive gastropathy.  Negative for intestinal metaplasia or dysplasia.  No H. pylori-like organisms identified on H&E.  B. GE JUNCTION:  Reactive gastric mucosa; negative for intestinal metaplasia, dysplasia and malignancy.  No squamous esophageal mucosa is identified.    Assessment / Plan      1.  Suspected acute gastric erosions  2.  History of gastric ulcer with hemorrhage, unspecified chronicity  3.  History of PUD (peptic ulcer disease)  4.  History of  erosive esophagitis  5.  Opioid induced nausea  6.  Opioid-induced constipation  7.  Iron deficiency anemia secondary to chronic blood loss  8.  Long-term NSAID use  9.  Abnormal CT abdomen  4/11/2024  His EGD done on 01/19/2024 again showed persistent erosive gastropathy.  Patient did not change his diet.  Patient did not change his lifestyle.  He continues to smoke, continues to drink excessive pops.  Patient has a significant abdominal pain, nausea associated opioid-induced constipation.  Continue to take NSAIDs especially Excedrin, ibuprofen frequently.  He is currently on Suboxone.  Lab work on 12/14/2023 revealed a borderline low sodium of 130 chloride 95 glucose of 107 otherwise CMP was normal.  Hemoglobin was 11.4 with BBC 13,000.  CT abdomen pelvis with contrast on 11/28/2023 showed finding concerning for gastritis.  Nodular focus of soft tissue states representing possible enlarged lymph node.  That Protonix is not helping.    Unfortunately without the lifestyle changes and dietary changes and controlled reduction of opioid use patient's symptoms may not improve.  Patient has been counseled on these things again this time  Abstinence from smoking  Controlled reduction of Suboxone dose.  Will change Protonix to Nexium 40 mg p.o. daily  Short course therapy with sucralfate 1 g 4 times daily for 10 days  Will start him on senna 2 tablets p.o. nightly  Zofran 4 mg p.o. twice daily as needed  Will schedule him for EGD      10. Personal history of colonic polyps  As per patient he may have had a colonoscopy done about 3-4 years ago at [place] details unknown.  He thinks he had a polyps removed.    Will schedule him for colonoscopy along with the EGD.    - Case Request  - PEG-KCl-NaCl-NaSulf-Na Asc-C (MOVIPREP) 100 g reconstituted solution powder; Take 1,000 mL by mouth Take As Directed. Take as directed for colonoscopy prep.  Dispense: 1 each; Refill: 0        Follow Up:   No follow-ups on file.    Leonor  TOM Graff MD  [facility] [place]  2024  15:49 EDT     Please note that portions of this note may have been completed with a voice recognition program.     Electronically signed by Leonor Graff MD at 24 [phone]                 Ashvin Kelley DO at 24 0940            [facility]   HISTORY AND PHYSICAL      Name:  Americo Davis Jr.   Age:  48 y.o.  Sex:  male  :  1975  MRN:  [id number]   Visit Number:  [id number]  Admission Date:  2024  Date Of Service:  24  Primary Care Physician:  Tigist Wheatley APRN    Chief Complaint:     Abdominal pain    History Of Presenting Illness:      Patient is a chronically ill 48-year-old male with history of erosive esophagitis with bleeding gastric ulcer, iron deficiency anemia, opioid abuse on Suboxone, and hypertension.  Patient presents from home with his mother for continued abdominal pain, nausea/vomiting.  Also notes bright red blood per rectum.  Patient follows with Dr. Barbara Mata.  Was seen in the office 2024 for complaints of abdominal pain with nausea/vomiting.  At the time it is noted that patient is continuing to take NSAIDs despite counseling.  Will take Aleve and ibuprofen nearly daily for chronic joint pain.  Patient again underwent counseling, changed Protonix to Nexium 40 mg daily.  He was also prescribed Carafate.  EGD was scheduled for which patient did not follow-up.  Most recent EGD performed 2024 which showed persistent erosive gastropathy.  Continues to smoke 1 pack/day.  Denies excessive alcohol use.  Upon arrival to the ER, patient afebrile hypertensive and nonhypoxic on room air.  Labs significant for BUN 26.  CBC unremarkable, hemoglobin 13.8 and hematocrit 44.  UA negative.  CT abdomen pelvis showed no obvious inflammatory changes or bowel wall thickening.  No evidence of bowel obstruction.  Does note nonobstructing right renal stones.  Gastroenterology  agreed to consult recommended observation with plans for endoscopy.  Patient started on Protonix drip and hospitalist asked to admit.    Review Of Systems:    All systems were reviewed and negative except as mentioned in history of presenting illness, assessment and plan.    Past Medical History: Patient  has a past medical history of Anemia, Anxiety, Asthma, Back pain, Bleeding ulcer, Body piercing (10/06/2021), CHF (congestive heart failure), Chronic venous insufficiency, Colitis, Constipation, COPD (chronic obstructive pulmonary disease), Corn or callus, Coronary artery disease, Depression, GERD (gastroesophageal reflux disease), GI bleed, Gout, Headache, History of ASCVD (atherosclerotic cardiovascular disease), History of blood transfusion, History of fracture (10/06/2021), History of heart attack, History of stomach ulcers, Hyperlipidemia, Hypertension, Migraine headache, Mild aortic valve sclerosis, Mild mitral insufficiency, On home oxygen therapy, Poor historian (10/06/2021), Snores, Tattoos, Vision problems, and Weight loss.    Past Surgical History: Patient  has a past surgical history that includes Appendectomy; Fracture surgery (Right); [place] tooth extraction; Colonoscopy; Esophagogastroduodenoscopy; Esophagogastroduodenoscopy (N/A, 11/5/2021); and Esophagogastroduodenoscopy (N/A, 1/19/2023).    Social History: Patient  reports that he has been smoking cigarettes. He started smoking about 33 years ago. He has a 16.6 pack-year smoking history. He has been exposed to tobacco smoke. His smokeless tobacco use includes snuff. He reports that he does not currently use alcohol. He reports that he does not currently use drugs.    Family History:  Patient's family history has been reviewed and found to be noncontributory.     Allergies:      Sulfa antibiotics    Home Medications:    Prior to Admission Medications       Prescriptions Last Dose Informant Patient Reported? Taking?    buprenorphine-naloxone  (SUBOXONE) 8-2 MG per SL tablet   Yes No    Place 1.5 tablets under the tongue Daily.    cyclobenzaprine (FLEXERIL) 10 MG tablet   Yes No    Patient not taking:  Reported on 4/11/2024    esomeprazole (nexIUM) 40 MG capsule   No No    Take 1 capsule by mouth 2 (Two) Times a Day.    hydroCHLOROthiazide 12.5 MG tablet   Yes No    Take 1 tablet by mouth Daily.    hydrOXYzine (ATARAX) 50 MG tablet   No No    TAKE 1 TABLET BY MOUTH THREE TIMES DAILY AS NEEDED FOR ANXIETY OR SLEEP    losartan (COZAAR) 50 MG tablet   Yes No    metoprolol succinate XL (TOPROL-XL) 25 MG 24 hr tablet  Spouse/Significant Other Yes No    Take 1 tablet by mouth Daily.    naloxone (NARCAN) 4 MG/0.1ML nasal spray   No No    1 spray into the nostril(s) as directed by provider As Needed (.). use for oversedation and call 911    ondansetron (Zofran) 4 MG tablet   No No    Take 1 tablet by mouth Every 8 (Eight) Hours As Needed for Nausea.    PARoxetine (PAXIL) 20 MG tablet   No No    Take 1 tablet by mouth Daily.    Patient not taking:  Reported on 4/11/2024    senna 8.6 MG tablet   No No    Take 2 tablets by mouth Every Night.    sucralfate (Carafate) 1 g tablet   No No    Take 1 tablet by mouth 4 (Four) Times a Day for 10 days. Indications: Stomach Ulcer          ED Medications:    Medications   sodium chloride 0.9 % flush 10 mL (has no administration in time range)   sodium chloride 0.9 % infusion (250 mL/hr Intravenous New Bag 4/18/24 0723)   pantoprazole (PROTONIX) 40 mg in 100mL NS IVPB (8 mg/hr Intravenous New Bag 4/18/24 0733)   morphine injection 4 mg (4 mg Intravenous Given 4/18/24 0911)   ondansetron (ZOFRAN) injection 4 mg (4 mg Intravenous Given 4/18/24 0723)   pantoprazole (PROTONIX) injection 40 mg (40 mg Intravenous Given 4/18/24 0723)   Morphine sulfate (PF) injection 2 mg (2 mg Intravenous Given 4/18/24 0723)     Vital Signs:  Temp:  [97.7 °F (36.5 °C)] 97.7 °F (36.5 °C)  Heart Rate:  [73-92] 80  Resp:  [20] 20  BP: (123-169)/()  "161/99        04/18/24  0640   Weight: 72.6 kg (160 lb)     Body mass index is 22.96 kg/m².    Physical Exam:     Most recent vital Signs: /99   Pulse 80   Temp 97.7 °F (36.5 °C) (Oral)   Resp 20   Ht 177.8 cm (70\")   Wt 72.6 kg (160 lb)   SpO2 99%   BMI 22.96 kg/m²     Physical Exam  Vitals reviewed.   Constitutional:       General: He is not in acute distress.     Appearance: He is normal weight. He is ill-appearing.   HENT:      Head: Normocephalic and atraumatic.      Right Ear: External ear normal.      Left Ear: External ear normal.      Mouth/Throat:      Mouth: Mucous membranes are moist.      Pharynx: Oropharynx is clear.   Eyes:      Extraocular Movements: Extraocular movements intact.      Conjunctiva/sclera: Conjunctivae normal.   Cardiovascular:      Rate and Rhythm: Normal rate and regular rhythm.      Pulses: Normal pulses.      Heart sounds: Normal heart sounds.   Pulmonary:      Effort: Pulmonary effort is normal.      Breath sounds: Normal breath sounds.   Abdominal:      General: There is no distension.      Tenderness: There is abdominal tenderness. There is guarding.   Musculoskeletal:         General: Normal range of motion.      Right lower leg: No edema.      Left lower leg: No edema.   Skin:     General: Skin is warm and dry.   Neurological:      General: No focal deficit present.      Mental Status: He is alert and oriented to person, place, and time.   Psychiatric:         Behavior: Behavior normal.         Laboratory data:    I have reviewed the labs done in the emergency room.    Results from last 7 days   Lab Units 04/18/24  0659   SODIUM mmol/L 140   POTASSIUM mmol/L 4.5   CHLORIDE mmol/L 103   CO2 mmol/L 21.9*   BUN mg/dL 26*   CREATININE mg/dL 0.88   CALCIUM mg/dL 10.0   BILIRUBIN mg/dL <0.2   ALK PHOS U/L 77   ALT (SGPT) U/L 9   AST (SGOT) U/L 12   GLUCOSE mg/dL 126*     Results from last 7 days   Lab Units 04/18/24  0659   WBC 10*3/mm3 8.65   HEMOGLOBIN g/dL 14.6 " "  HEMATOCRIT % 43.7   PLATELETS 10*3/mm3 249                     Results from last 7 days   Lab Units 04/18/24  0659   LIPASE U/L 82*               Invalid input(s): \"USDES\", \"NITRITITE\", \"BACT\", \"EP\"    Pain Management Panel  More data exists         Latest Ref Rng & Units 6/5/2023 5/15/2023   Pain Management Panel   Amphetamine, Urine Qual Negative Negative  Negative    Barbiturates Screen, Urine Negative Negative  Negative    Benzodiazepine Screen, Urine Negative Negative  Negative    Buprenorphine, Screen, Urine Negative Positive  Negative    Cocaine Screen, Urine Negative Negative  Negative    Methadone Screen , Urine Negative Negative  Negative    Methamphetamine, Ur Negative Negative  Negative        EKG:          Radiology:    CT Abdomen Pelvis Without Contrast    Result Date: 4/18/2024  PROCEDURE: CT ABDOMEN PELVIS WO CONTRAST-  HISTORY: GI bleed, concern for perf; R10.9-Unspecified abdominal pain; G89.29-Other chronic pain; K27.9-Peptic ulcer, site unspecified, unspecified as acute or chronic, without hemorrhage or perforation  COMPARISON: 11/28/2023  Note: Noncontrast exam is less sensitive for assessment of the bowel and solid abdominal organs  TECHNIQUE: Noncontrast exam  CT ABDOMEN: Nonobstructing right renal stones are seen within the calyces largest measuring up to 6 mm similar to prior exam. Otherwise solid organs are grossly unremarkable. The bowel shows no evidence of obstruction. There is no free air or free fluid within the abdomen. Gallbladder is normal. Mild fecal impaction of the colon is present. No gross bowel wall thickening is appreciated.  CT PELVIS: No bowel dilatation is seen. There is no free fluid. Appendix is nonvisualized. No secondary findings of appendicitis are seen. Prostate is minimally enlarged..      1. No obvious inflammatory changes or bowel wall thickening. 2. No evidence of bowel obstruction 3. Nonobstructing right renal stones  This study was performed with techniques " to keep radiation doses as low as reasonably achievable (ALARA). Individualized dose reduction techniques using automated exposure control or adjustment of vA and/or kV according to the patient size were employed.  This report was signed and finalized on 2024 8:00 AM by Aly Tong MD.       Assessment:    Bright red blood per rectum, GI bleed POA  History of gastric ulcer with hemorrhage  History of erosive gastropathy  Frequent NSAID use  Iron deficiency anemia  Hypertension  CAD  Depression/anxiety  Tobacco abuse    Plan:    Will admit patient hospital for observation, anticipate less than 2 midnight stay.    GI bleed  -GI consult  -Plan for endoscopy tomorrow  -Continue Protonix drip  - against NSAID use  -Monitor H&H, no indication for PRBC transfusion  -Supportive care    Nicotine patch.  Further orders as clinical course dictates.    Risk Assessment: High  DVT Prophylaxis: SCDs  Code Status: Full  Diet: N.p.o.             Ashvin Kelley DO  24  09:46 EDT    Dictated utilizing Dragon dictation.      Electronically signed by Ashvin Kelley DO at 24 1511       Leonor Graff MD at 24 1545               Follow Up Note     Date: 2024   Patient Name: Americo Davis Jr.  MRN: [id number]  : 1975     Referring Physician: Tigist Wheatley APRN    Chief Complaint:    Chief Complaint   Patient presents with    Gastric Ulcer    Peptic Ulcer Disease    Heartburn    Vomiting     Seen at ER recently         Interval History:   2024  Americo Davis Jr. is a 48 y.o. male who is here today for follow up for his ongoing abdominal pain nausea vomiting.  He states that he continues to have a worsening abdominal pain mostly in the upper abdomen and the left side abdomen with nausea and vomiting.  He could not stop his NSAIDs he is still taking ibuprofen Aleve and Excedrin as needed for his migraine headache.  He continues to smoke.  Continues to drink  "excessive pops.  He did not change any of his diet.  States that he had to come to emergency room in December and Protonix given is not working for him.    5/20/2021  He had an EGD at [facility] in [place] in Jan 2021 by Dr. Ricks and was told he had a bleeding ulcer (not sure of exact location). He was given a blood transfusion at that time and was placed on protonix 40 mg BID but insurance did not cover protonix. He has been taking prilosec 20 mg once daily otc. He has not been having heartburn unless he eats spicy foods. He denies any current abdominal pain. No melena. No rectal bleeding. Normal bowel movements brown in color, occurring every 2-3 days. Reports severe fatigue and relates this to his lung disease, still smoking and uses oxygen at night. He had labs last a couple of weeks ago, not sure of those results. Not currently taking any iron supplement.      Had colonoscopy last year in 2020, had 1 colon polyp removed. He was told to have another colonoscopy in 5 years. He cannot recall the provider's name who completed this. There is no known family history of colon cancer or colon polyps.    Subjective      Past Medical History:   Past Medical History:   Diagnosis Date    Anemia     Anxiety     Asthma     Back pain     Bleeding ulcer     Body piercing 10/06/2021    ears    CHF (congestive heart failure)     Reported by patient's wife    Chronic venous insufficiency     Colitis     Constipation     COPD (chronic obstructive pulmonary disease)     [place] or callus     Coronary artery disease     Depression     GERD (gastroesophageal reflux disease)     GI bleed     Gout     Headache     History of ASCVD (atherosclerotic cardiovascular disease)     History of blood transfusion     Spouse reported no reaction to transfuson     History of fracture 10/06/2021    Hx right pinky fracture - did require surgery    History of heart attack     Spouse reported \"2 light heart attacks at age 30\"     History of stomach ulcers     " Hyperlipidemia     Hypertension     Migraine headache     Mild aortic valve sclerosis     Mild mitral insufficiency     Spouse reported MVP and that Dr Preciado said not sever enough to warrant surgery    On home oxygen therapy     Spouse reported at 2L/min NC prn    Poor historian 10/06/2021    Snores     Tattoos     Vision problems     Weight loss     Recent Weight Loss Involuntary For Over A Year Or More     Past Surgical History:   Past Surgical History:   Procedure Laterality Date    APPENDECTOMY      COLONOSCOPY      ENDOSCOPY      ENDOSCOPY N/A 11/5/2021    Procedure: ESOPHAGOGASTRODUODENOSCOPY WITH BIOPSY;  Surgeon: Leonor Graff MD;  Location: [facility] ENDOSCOPY;  Service: Gastroenterology;  Laterality: N/A;    ENDOSCOPY N/A 1/19/2023    Procedure: ESOPHAGOGASTRODUODENOSCOPY WITH BIOPSIES;  Surgeon: Leonor Graff MD;  Location: [facility] ENDOSCOPY;  Service: Gastroenterology;  Laterality: N/A;    FRACTURE SURGERY Right     Pinky finger    WISDOM TOOTH EXTRACTION         Family History:   Family History   Problem Relation Age of Onset    Arthritis Mother     Thyroid disease Mother     Hypertension Mother     Migraines Sister     Thyroid disease Sister     Hypertension Sister     Mental illness Brother     Cancer Paternal Uncle     Arthritis Maternal Grandmother     Stroke Maternal Grandmother     Colon cancer Neg Hx     Cirrhosis Neg Hx     Liver cancer Neg Hx     Liver disease Neg Hx        Social History:   Social History     Socioeconomic History    Marital status:    Tobacco Use    Smoking status: Every Day     Current packs/day: 0.50     Average packs/day: 0.5 packs/day for 33.3 years (16.6 ttl pk-yrs)     Types: Cigarettes     Start date: 1991     Passive exposure: Current    Smokeless tobacco: Current     Types: Snuff   Vaping Use    Vaping status: Some Days    Substances: Nicotine (Spouse reported no use in 2 years), Flavoring    Devices: Disposable   Substance and Sexual Activity  "   Alcohol use: Not Currently     Comment: social    Drug use: Not Currently     Comment: opiates, snort    Sexual activity: Defer       Medications:     Current Outpatient Medications:     buprenorphine-naloxone (SUBOXONE) 8-2 MG per SL tablet, Place 1.5 tablets under the tongue Daily., Disp: , Rfl:     hydroCHLOROthiazide 12.5 MG tablet, Take 1 tablet by mouth Daily., Disp: , Rfl:     hydrOXYzine (ATARAX) 50 MG tablet, TAKE 1 TABLET BY MOUTH THREE TIMES DAILY AS NEEDED FOR ANXIETY OR SLEEP, Disp: 90 tablet, Rfl: 0    losartan (COZAAR) 50 MG tablet, , Disp: , Rfl:     metoprolol succinate XL (TOPROL-XL) 25 MG 24 hr tablet, Take 1 tablet by mouth Daily., Disp: , Rfl:     naloxone (NARCAN) 4 MG/0.1ML nasal spray, 1 spray into the nostril(s) as directed by provider As Needed (.). use for oversedation and call 911, Disp: 1 each, Rfl: 2    pantoprazole (PROTONIX) 40 MG EC tablet, Take 1 tablet by mouth Daily., Disp: 90 tablet, Rfl: 0    cyclobenzaprine (FLEXERIL) 10 MG tablet, , Disp: , Rfl:     PARoxetine (PAXIL) 20 MG tablet, Take 1 tablet by mouth Daily. (Patient not taking: Reported on 4/11/2024), Disp: 30 tablet, Rfl: 0    Allergies:   Allergies   Allergen Reactions    Sulfa Antibiotics Other (See Comments)     \"causes his insides to burn and his skin gets really red\" reported by patient's spouse        Review of Systems:   Review of Systems   Constitutional:  Negative for appetite change, fatigue, fever and unexpected weight loss.   HENT:  Negative for trouble swallowing.    Gastrointestinal:  Positive for abdominal pain, constipation, nausea, vomiting, GERD and indigestion. Negative for abdominal distention, anal bleeding, blood in stool, diarrhea and rectal pain.       The following portions of the patient's history were reviewed and updated as appropriate: allergies, current medications, past family history, past medical history, past social history, past surgical history and problem list.    Objective " "    Physical Exam:  Vital Signs:   Vitals:    04/11/24 1548   BP: 115/87   Pulse: 89   Temp: 96.6 °F (35.9 °C)   TempSrc: Infrared   Weight: 73.5 kg (162 lb)  Comment: with clothing/shoes   Height: 177.8 cm (70\")  Comment: per patient       Physical Exam  Constitutional:       Appearance: Normal appearance.   HENT:      Head: Normocephalic and atraumatic.   Eyes:      Conjunctiva/sclera: Conjunctivae normal.   Abdominal:      General: Abdomen is flat. There is no distension.      Palpations: There is no mass.      Tenderness: There is no abdominal tenderness (Minimal tenderness in the epigastric region umbilical region the left lower quadrant). There is no guarding or rebound.      Hernia: No hernia is present.   Musculoskeletal:      Cervical back: Normal range of motion and neck supple.   Neurological:      Mental Status: He is alert.         Results Review:   I reviewed the patient's new clinical results.    No visits with results within 90 Day(s) from this visit.   Latest known visit with results is:   Admission on 12/14/2023, Discharged on 12/14/2023   Component Date Value Ref Range Status    Color, UA 12/14/2023 Yellow  Yellow, Straw Final    Appearance, UA 12/14/2023 Cloudy (A)  Clear Final    pH, UA 12/14/2023 7.5  5.0 - 8.0 Final    Specific Gravity, UA 12/14/2023 1.015  1.005 - 1.030 Final    Glucose, UA 12/14/2023 Negative  Negative Final    Ketones, UA 12/14/2023 Negative  Negative Final    Bilirubin, UA 12/14/2023 Negative  Negative Final    Blood, UA 12/14/2023 Negative  Negative Final    Protein, UA 12/14/2023 30 mg/dL (1+) (A)  Negative Final    Leuk Esterase, UA 12/14/2023 Large (3+) (A)  Negative Final    Nitrite, UA 12/14/2023 Positive (A)  Negative Final    Urobilinogen, UA 12/14/2023 0.2 E.U./dL  0.2 - 1.0 E.U./dL Final    RBC, UA 12/14/2023 None Seen  None Seen, 0-2 /HPF Final    WBC, UA 12/14/2023 11-20 (A)  None Seen, 0-2 /HPF Final    Bacteria, UA 12/14/2023 1+ (A)  None Seen /HPF Final    " Squamous Epithelial Cells, UA 12/14/2023 7-12 (A)  None Seen, 0-2 /HPF Final    Hyaline Casts, UA 12/14/2023 None Seen  None Seen /LPF Final    Methodology 12/14/2023 Manual Light Microscopy   Final    WBC 12/14/2023 13.89 (H)  3.40 - 10.80 10*3/mm3 Final    RBC 12/14/2023 3.84 (L)  4.14 - 5.80 10*6/mm3 Final    Hemoglobin 12/14/2023 11.4 (L)  13.0 - 17.7 g/dL Final    Hematocrit 12/14/2023 34.8 (L)  37.5 - 51.0 % Final    MCV 12/14/2023 90.6  79.0 - 97.0 fL Final    MCH 12/14/2023 29.7  26.6 - 33.0 pg Final    MCHC 12/14/2023 32.8  31.5 - 35.7 g/dL Final    RDW 12/14/2023 14.7  12.3 - 15.4 % Final    RDW-SD 12/14/2023 49.1  37.0 - 54.0 fl Final    MPV 12/14/2023 9.2  6.0 - 12.0 fL Final    Platelets 12/14/2023 361  140 - 450 10*3/mm3 Final    Neutrophil % 12/14/2023 82.6 (H)  42.7 - 76.0 % Final    Lymphocyte % 12/14/2023 11.4 (L)  19.6 - 45.3 % Final    Monocyte % 12/14/2023 4.7 (L)  5.0 - 12.0 % Final    Eosinophil % 12/14/2023 0.2 (L)  0.3 - 6.2 % Final    Basophil % 12/14/2023 0.1  0.0 - 1.5 % Final    Immature Grans % 12/14/2023 1.0 (H)  0.0 - 0.5 % Final    Neutrophils, Absolute 12/14/2023 11.46 (H)  1.70 - 7.00 10*3/mm3 Final    Lymphocytes, Absolute 12/14/2023 1.59  0.70 - 3.10 10*3/mm3 Final    Monocytes, Absolute 12/14/2023 0.65  0.10 - 0.90 10*3/mm3 Final    Eosinophils, Absolute 12/14/2023 0.03  0.00 - 0.40 10*3/mm3 Final    Basophils, Absolute 12/14/2023 0.02  0.00 - 0.20 10*3/mm3 Final    Immature Grans, Absolute 12/14/2023 0.14 (H)  0.00 - 0.05 10*3/mm3 Final    nRBC 12/14/2023 0.0  0.0 - 0.2 /100 WBC Final    Glucose 12/14/2023 107 (H)  65 - 99 mg/dL Final    BUN 12/14/2023 13  6 - 20 mg/dL Final    Creatinine 12/14/2023 0.87  0.76 - 1.27 mg/dL Final    Sodium 12/14/2023 130 (L)  136 - 145 mmol/L Final    Potassium 12/14/2023 3.7  3.5 - 5.2 mmol/L Final    Chloride 12/14/2023 95 (L)  98 - 107 mmol/L Final    CO2 12/14/2023 23.6  22.0 - 29.0 mmol/L Final    Calcium 12/14/2023 9.4  8.6 - 10.5 mg/dL  Final    Total Protein 12/14/2023 8.0  6.0 - 8.5 g/dL Final    Albumin 12/14/2023 3.7  3.5 - 5.2 g/dL Final    ALT (SGPT) 12/14/2023 10  1 - 41 U/L Final    AST (SGOT) 12/14/2023 12  1 - 40 U/L Final    Alkaline Phosphatase 12/14/2023 84  39 - 117 U/L Final    Total Bilirubin 12/14/2023 <0.2  0.0 - 1.2 mg/dL Final    Globulin 12/14/2023 4.3  gm/dL Final    A/G Ratio 12/14/2023 0.9  g/dL Final    BUN/Creatinine Ratio 12/14/2023 14.9  7.0 - 25.0 Final    Anion Gap 12/14/2023 11.4  5.0 - 15.0 mmol/L Final    eGFR 12/14/2023 105.8  >60.0 mL/min/1.73 Final      No radiology results for the last 90 days.      EGD dated 11/5/2021 per Dr. Graff  - Normal oropharynx.  - Z-line irregular, 40 cm from the incisors.  - LA Grade A reflux esophagitis with no bleeding.  - Esophageal small cyst was found.  - Erosive gastropathy with stigmata of recent bleeding. Biopsied.  - Atrophic, discolored and texture changed mucosa in the antrum. Biopsied.  - Multiple healed ulcer scar in the gastric body, in the incisura and in the gastric antrum.  - Normal duodenal bulb, first portion of the duodenum, second portion of the duodenum and third portion of the duodenum.  - Anemia improved, no current bleeding  -Gastric biopsy with mild foveolar hyperplasia and PPI effect.  No H. pylori.  Gastric antrum biopsy of abnormal mucosa with reactive gastropathy, no H. pylori.     EGD dated 1/19/2023 per Dr. Graff  - Normal oropharynx.  - Z-line irregular, 40 cm from the incisors.  - [place]-colored mucosa suspicious for short-segment Mg's esophagus. Biopsied.  - Erosive gastropathy with stigmata of recent bleeding. Biopsied.  - Non-bleeding gastric ulcers with no stigmata of bleeding.  - Normal duodenal bulb, first portion of the duodenum, second portion of the duodenum and third portion of the duodenum.  - Findings suggestive of NSAID induced gastropathy, PUD  A.   ANTRUM AND BODY, BIOPSY:   Reactive gastropathy.   Negative for intestinal  metaplasia or dysplasia.   No H. pylori-like organisms identified on H&E.   B.   GE JUNCTION:   Reactive gastric mucosa; negative for intestinal metaplasia, dysplasia and   malignancy.   No squamous esophageal mucosa is identified.    EGD 1/19/2024  - Normal oropharynx.   - Z-line irregular, 40 cm from the incisors.   - [place]-colored mucosa suspicious for short-segment Mg's esophagus. Biopsied.   - Erosive gastropathy with stigmata of recent bleeding. Biopsied.   - Non-bleeding gastric ulcers with no stigmata of bleeding.   - Normal duodenal bulb, first portion of the duodenum, second portion of the duodenum and third portion of the duodenum.   - Findings sugestive of NSAID induced gastropathy, PUD     DIAGNOSIS:  A. ANTRUM AND BODY, BIOPSY:  Reactive gastropathy.  Negative for intestinal metaplasia or dysplasia.  No H. pylori-like organisms identified on H&E.  B. GE JUNCTION:  Reactive gastric mucosa; negative for intestinal metaplasia, dysplasia and malignancy.  No squamous esophageal mucosa is identified.    Assessment / Plan      1.  Suspected acute gastric erosions  2.  History of gastric ulcer with hemorrhage, unspecified chronicity  3.  History of PUD (peptic ulcer disease)  4.  History of erosive esophagitis  5.  Opioid induced nausea  6.  Opioid-induced constipation  7.  Iron deficiency anemia secondary to chronic blood loss  8.  Long-term NSAID use  9.  Abnormal CT abdomen  4/11/2024  His EGD done on 01/19/2024 again showed persistent erosive gastropathy.  Patient did not change his diet.  Patient did not change his lifestyle.  He continues to smoke, continues to drink excessive pops.  Patient has a significant abdominal pain, nausea associated opioid-induced constipation.  Continue to take NSAIDs especially Excedrin, ibuprofen frequently.  He is currently on Suboxone.  Lab work on 12/14/2023 revealed a borderline low sodium of 130 chloride 95 glucose of 107 otherwise CMP was normal.  Hemoglobin was  11.4 with [facility] 13,000.  CT abdomen pelvis with contrast on 11/28/2023 showed finding concerning for gastritis.  Nodular focus of soft tissue states representing possible enlarged lymph node.  That Protonix is not helping.    Unfortunately without the lifestyle changes and dietary changes and controlled reduction of opioid use patient's symptoms may not improve.  Patient has been counseled on these things again this time  Abstinence from smoking  Controlled reduction of Suboxone dose.  Will change Protonix to Nexium 40 mg p.o. daily  Short course therapy with sucralfate 1 g 4 times daily for 10 days  Will start him on senna 2 tablets p.o. nightly  Zofran 4 mg p.o. twice daily as needed  Will schedule him for EGD      10. Personal history of colonic polyps  As per patient he may have had a colonoscopy done about 3-4 years ago at [facility] details unknown.  He thinks he had a polyps removed.    Will schedule him for colonoscopy along with the EGD.    - Case Request  - PEG-KCl-NaCl-NaSulf-Na Asc-C (MOVIPREP) 100 g reconstituted solution powder; Take 1,000 mL by mouth Take As Directed. Take as directed for colonoscopy prep.  Dispense: 1 each; Refill: 0        Follow Up:   No follow-ups on file.    Leonor Graff MD  [facility] [place]  4/11/2024  15:49 EDT     Please note that portions of this note may have been completed with a voice recognition program.     Electronically signed by Leonor Graff MD at 04/11/24 1853          Emergency Department Notes        Ashvin Levi PCT at 04/18/24 0711       Summary:GI                 Transferred Dr. Graff to Dr. Edward at this time.     Electronically signed by Ashvin Levi PCT at 04/18/24 0717       Mark Edward MD at 04/18/24 0696          Subjective   History of Present Illness  This patient is an unfortunate 48-year-old gentleman with a longstanding history of abdominal complaints.  Please refer to past medical documentation  including recent GI note from 4/11/2024 which I have reviewed in detail.  Patient has had several EGDs.  It sounds like he is being scheduled for another EGD and potential colonoscopy in the coming days/weeks.  He has had ongoing complaints of abdominal pain and his GI physician believes this is related to persistent alcohol, tobacco and NSAID usage.  Patient comes in today stating that he has had pain for several months worse over the last week or so.  He has seen his PCP and his GI physician as mentioned above.  He has had ongoing nausea and vomiting as well.  Patient has had some of his medications adjusted by GI.  He has a history of PUD and bleeding ulcer.  He received a transfusion in the past as well.  He denies any hemoptysis or hematemesis currently.  Denies any dark tarry stools but has had some bright red blood per rectum.  He is resting comfortably, oriented x 4 and unaccompanied.    Patient denies chest pain, shortness of breath, fevers, myalgias or headache.  He reports that he has symptoms like this quite often but that this discomfort and pain is worse than usual.    Past medical history  PUD, COPD, bleeding ulcer, dyslipidemia, hypertension, depression, migraine headaches, CAD, GERD    Family history  Thyroid disease and hypertension, mom      Review of Systems   Constitutional: Negative.  Negative for chills, fatigue, fever and unexpected weight change.   HENT:  Negative for dental problem, ear pain, hearing loss and sinus pressure.    Eyes:  Negative for pain and visual disturbance.   Respiratory:  Negative for chest tightness and shortness of breath.    Cardiovascular:  Negative for chest pain, palpitations and leg swelling.   Gastrointestinal:  Positive for abdominal pain, nausea and vomiting. Negative for blood in stool and diarrhea.   Genitourinary:  Negative for difficulty urinating, dysuria, frequency, hematuria and urgency.   Musculoskeletal:  Negative for myalgias, neck pain and neck  "stiffness.   Neurological:  Negative for seizures, syncope, speech difficulty, light-headedness and headaches.   Psychiatric/Behavioral:  Negative for confusion.    All other systems reviewed and are negative.      Past Medical History:   Diagnosis Date    Anemia     Anxiety     Asthma     Back pain     Bleeding ulcer     Body piercing 10/06/2021    ears    CHF (congestive heart failure)     Reported by patient's wife    Chronic venous insufficiency     Colitis     Constipation     COPD (chronic obstructive pulmonary disease)     [place] or callus     Coronary artery disease     Depression     GERD (gastroesophageal reflux disease)     GI bleed     Gout     Headache     History of ASCVD (atherosclerotic cardiovascular disease)     History of blood transfusion     Spouse reported no reaction to transfuson     History of fracture 10/06/2021    Hx right pinky fracture - did require surgery    History of heart attack     Spouse reported \"2 light heart attacks at age 30\"     History of stomach ulcers     Hyperlipidemia     Hypertension     Migraine headache     Mild aortic valve sclerosis     Mild mitral insufficiency     Spouse reported MVP and that Dr Preciado said not sever enough to warrant surgery    On home oxygen therapy     Spouse reported at 2L/min NC prn    Poor historian 10/06/2021    Snores     Tattoos     Vision problems     Weight loss     Recent Weight Loss Involuntary For Over A Year Or More       Allergies   Allergen Reactions    Sulfa Antibiotics Other (See Comments)     \"causes his insides to burn and his skin gets really red\" reported by patient's spouse        Past Surgical History:   Procedure Laterality Date    APPENDECTOMY      COLONOSCOPY      ENDOSCOPY      ENDOSCOPY N/A 11/5/2021    Procedure: ESOPHAGOGASTRODUODENOSCOPY WITH BIOPSY;  Surgeon: Leonor Graff MD;  Location: [facility] ENDOSCOPY;  Service: Gastroenterology;  Laterality: N/A;    ENDOSCOPY N/A 1/19/2023    Procedure: " ESOPHAGOGASTRODUODENOSCOPY WITH BIOPSIES;  Surgeon: Leonor Graff MD;  Location: [facility] ENDOSCOPY;  Service: Gastroenterology;  Laterality: N/A;    FRACTURE SURGERY Right     Pinky finger    WISDOM TOOTH EXTRACTION         Family History   Problem Relation Age of Onset    Arthritis Mother     Thyroid disease Mother     Hypertension Mother     Migraines Sister     Thyroid disease Sister     Hypertension Sister     Mental illness Brother     Cancer Paternal Uncle     Arthritis Maternal Grandmother     Stroke Maternal Grandmother     Colon cancer Neg Hx     Cirrhosis Neg Hx     Liver cancer Neg Hx     Liver disease Neg Hx        Social History     Socioeconomic History    Marital status:    Tobacco Use    Smoking status: Every Day     Current packs/day: 0.50     Average packs/day: 0.5 packs/day for 33.3 years (16.6 ttl pk-yrs)     Types: Cigarettes     Start date: 1991     Passive exposure: Current    Smokeless tobacco: Current     Types: Snuff   Vaping Use    Vaping status: Some Days    Substances: Nicotine (Spouse reported no use in 2 years), Flavoring    Devices: Disposable   Substance and Sexual Activity    Alcohol use: Not Currently     Comment: social    Drug use: Not Currently     Comment: opiates, snort    Sexual activity: Defer           Objective   Physical Exam  Vitals and nursing note reviewed.   Constitutional:       General: He is not in acute distress.     Appearance: He is well-developed. He is not toxic-appearing.   HENT:      Head: Normocephalic and atraumatic.      Jaw: No trismus.      Right Ear: External ear normal.      Left Ear: External ear normal.      Nose: Nose normal.      Mouth/Throat:      Mouth: Mucous membranes are moist. Mucous membranes are not dry. No oral lesions.      Dentition: No dental abscesses.      Pharynx: Oropharynx is clear. No posterior oropharyngeal erythema or uvula swelling.      Tonsils: No tonsillar exudate or tonsillar abscesses.   Eyes:      General:          Right eye: No discharge.         Left eye: No discharge.      Extraocular Movements: Extraocular movements intact.      Conjunctiva/sclera: Conjunctivae normal.      Right eye: Right conjunctiva is not injected.      Left eye: Left conjunctiva is not injected.      Pupils: Pupils are equal, round, and reactive to light.   Neck:      Vascular: No JVD.      Trachea: No tracheal tenderness.   Cardiovascular:      Rate and Rhythm: Normal rate and regular rhythm.      Heart sounds: Normal heart sounds.      No friction rub. No gallop.   Pulmonary:      Effort: Pulmonary effort is normal.      Breath sounds: Normal breath sounds. No wheezing or rales.   Chest:      Chest wall: No tenderness.   Abdominal:      General: Bowel sounds are normal. There is no distension.      Palpations: Abdomen is soft. Abdomen is not rigid. There is no mass or pulsatile mass.      Tenderness: There is abdominal tenderness. There is no guarding or rebound. Negative signs include McBurney's sign.      Comments: No signs of acute abdomen.  No pain at McBurney's point.  No pulsatile abdominal mass.  Mild mid abdominal discomfort only.   Musculoskeletal:         General: No tenderness or deformity. Normal range of motion.      Cervical back: Normal range of motion and neck supple. No rigidity. Normal range of motion.   Lymphadenopathy:      Cervical: No cervical adenopathy.   Skin:     General: Skin is warm and dry.      Capillary Refill: Capillary refill takes less than 2 seconds.      Findings: No erythema or rash.      Comments: No diaphoresis, lesions, nevi, petechia, purpura   Neurological:      Mental Status: He is alert and oriented to person, place, and time.      Cranial Nerves: No cranial nerve deficit.      Sensory: No sensory deficit.      Motor: No tremor or abnormal muscle tone.      Comments: 5/5 strength bilaterally with flexion and extension of fingers, wrist, elbows, knees and hips as well as plantar and dorsiflexion of  the foot.   Psychiatric:         Attention and Perception: He is attentive.         Speech: Speech normal.         Behavior: Behavior normal.         Thought Content: Thought content normal.         Judgment: Judgment normal.         Procedures          ED Course  ED Course as of 04/18/24 1531   u Apr 18, 2024   0651 I had a good conversation with the patient regarding the limitations of evaluation here and likely next steps.  Given the fact the patient was just seen by his GI physician 1 week ago and has an EGD and colonoscopy scheduled, I plan to discuss the case with  following results of the patient's labs.  At this point, the patient is not actively vomiting but fluids and Zofran have been ordered.  Will give a dose of IV Protonix as well although this has not worked incredibly well in the past, I wanted to give the patient something protective that might help.  I do not anticipate the patient will have any lab derangements in need of immediate correction.  It looks like the recommended lifestyle changes have not been started by the patient and I share his GI specialist concerned that unless these changes are enacted, the patient is going to continue to suffer symptoms.  Patient does not have an acute abdomen in any way currently.  His symptoms are chronic and have been ongoing over the last several months to years.  Will discuss the case with GI in order to ensure we have a good plan for the patient going forward.  Differential diagnosis and medical decision making have been discussed in great detail with the patient.  Certainly must consider exacerbation of chronic PUD associated with NSAID usage, alcohol and tobacco.  Must also consider perforation, bleeding ulcer, small bowel obstruction, pancreatitis and other etiologies.  Labs should help us to differentiate these.  I do not leave advanced imaging is necessary at this time but we may go down this path based on a change in clinical exam or  "following consultation.  Patient is aware of the plan and without question or complaint.  Final impression and plan following completion of workup. [EZEQUIEL]   0714 I discussed the case with Dr. Graff personally at approximately 7:12 AM Eastern time.  He will see the patient in the hospital.  I do believe he will move forward with EGD and colonoscopy and I shared this with the patient and his mom.  Will get a CT scan of the abdomen and pelvis given the patient's pain and my concern for perforation.  Have ordered Zofran, Protonix and will add Protonix drip as well.  Will give a small dose of pain medication as well. [EZEQUIEL]   0716 Vital signs are stable currently.  Patient does not have evidence of tachycardia.  Current heart rate is 80.  Exam is concerning.  CT scan of the abdomen and pelvis ordered and pending.  GI has been formally consulted. [EZEQUIEL]   0711 I reexamined the patient personally at approximately 7:30 AM Eastern time.  I found him resting comfortably and he told me he felt \"much better.\"  His mom was very happy as well.  We discussed the labs that were back at that time including the CBC.  We talked about the likely need for admission and the plan for his GI physician to complete an EGD.  Patient is planning to go over to CT scan here shortly. [EZEQUIEL]   0759 I reexamined the patient after CT scan and he is feeling much better.  I reexamined his abdomen and he is still incredibly tender.  CT images not yet available but I plan to review them as soon as they are made available. [EZEQUIEL]   0808 Lipase is 82.  CMP shows a BUN and creatinine of 26 and 0.8.  CO2 21.9.  Transaminase, alkaline phosphatase and bilirubin normal.  Urinalysis pending.  CBC unremarkable as noted. [EZEQUIEL]   0809 CT scan of the abdomen pelvis was reviewed independently by me.  I do not see any evidence of acute disease process.  Specifically I do not see any evidence of fluid collection or free air.  Official radiologic read confirms my own " independent interpretation and has been cut/pasted below for completeness.    IMPRESSION:  1. No obvious inflammatory changes or bowel wall thickening.  2. No evidence of bowel obstruction  3. Nonobstructing right renal stones     This study was performed with techniques to keep radiation doses as low  as reasonably achievable (ALARA). Individualized dose reduction  techniques using automated exposure control or adjustment of vA and/or  kV according to the patient size were employed.      This report was signed and finalized on 4/18/2024 8:00 AM by Aly Tong MD.   [EZEQUIEL]   0809 Still awaiting urinalysis which I anticipate we will get soon.  I am encouraged that the patient is feeling much better.  Given the patient's history of bright red blood per rectum, history of GI bleed and plan for GI to already evaluate the patient via EGD and colonoscopy, will bring the patient into the hospital to be evaluated by GI as previously discussed with GI.  Hospitalist appears to be Dr. Kerley.  Will bring the patient in for abdominal pain, bright red blood per rectum, history of GI bleed and PUD. [EZEQUIEL]   0818 I talked to the patient and his mom about the CT results and the plan for admission.  They were very appreciative for care.  Mom asked if I knew when exactly the procedure would take place and I told her I did not but would let the admitting and GI consultative teams update them when more information was available. [EZEQUIEL]      ED Course User Index  [EZEQUIEL] Mark Edward MD      Recent Results (from the past 24 hour(s))   Comprehensive Metabolic Panel    Collection Time: 04/18/24  6:59 AM    Specimen: Blood   Result Value Ref Range    Glucose 126 (H) 65 - 99 mg/dL    BUN 26 (H) 6 - 20 mg/dL    Creatinine 0.88 0.76 - 1.27 mg/dL    Sodium 140 136 - 145 mmol/L    Potassium 4.5 3.5 - 5.2 mmol/L    Chloride 103 98 - 107 mmol/L    CO2 21.9 (L) 22.0 - 29.0 mmol/L    Calcium 10.0 8.6 - 10.5 mg/dL    Total Protein 7.7 6.0 - 8.5 g/dL     Albumin 4.2 3.5 - 5.2 g/dL    ALT (SGPT) 9 1 - 41 U/L    AST (SGOT) 12 1 - 40 U/L    Alkaline Phosphatase 77 39 - 117 U/L    Total Bilirubin <0.2 0.0 - 1.2 mg/dL    Globulin 3.5 gm/dL    A/G Ratio 1.2 g/dL    BUN/Creatinine Ratio 29.5 (H) 7.0 - 25.0    Anion Gap 15.1 (H) 5.0 - 15.0 mmol/L    eGFR 105.4 >60.0 mL/min/1.73   Lipase    Collection Time: 04/18/24  6:59 AM    Specimen: Blood   Result Value Ref Range    Lipase 82 (H) 13 - 60 U/L   Green Top (Gel)    Collection Time: 04/18/24  6:59 AM   Result Value Ref Range    Extra Tube Hold for add-ons.    Lavender Top    Collection Time: 04/18/24  6:59 AM   Result Value Ref Range    Extra Tube hold for add-on    Gold Top - SST    Collection Time: 04/18/24  6:59 AM   Result Value Ref Range    Extra Tube Hold for add-ons.    Light Blue Top    Collection Time: 04/18/24  6:59 AM   Result Value Ref Range    Extra Tube Hold for add-ons.    CBC Auto Differential    Collection Time: 04/18/24  6:59 AM    Specimen: Blood   Result Value Ref Range    WBC 8.65 3.40 - 10.80 10*3/mm3    RBC 4.91 4.14 - 5.80 10*6/mm3    Hemoglobin 14.6 13.0 - 17.7 g/dL    Hematocrit 43.7 37.5 - 51.0 %    MCV 89.0 79.0 - 97.0 fL    MCH 29.7 26.6 - 33.0 pg    MCHC 33.4 31.5 - 35.7 g/dL    RDW 14.0 12.3 - 15.4 %    RDW-SD 45.3 37.0 - 54.0 fl    MPV 9.3 6.0 - 12.0 fL    Platelets 249 140 - 450 10*3/mm3    Neutrophil % 55.7 42.7 - 76.0 %    Lymphocyte % 34.6 19.6 - 45.3 %    Monocyte % 6.4 5.0 - 12.0 %    Eosinophil % 2.8 0.3 - 6.2 %    Basophil % 0.3 0.0 - 1.5 %    Immature Grans % 0.2 0.0 - 0.5 %    Neutrophils, Absolute 4.82 1.70 - 7.00 10*3/mm3    Lymphocytes, Absolute 2.99 0.70 - 3.10 10*3/mm3    Monocytes, Absolute 0.55 0.10 - 0.90 10*3/mm3    Eosinophils, Absolute 0.24 0.00 - 0.40 10*3/mm3    Basophils, Absolute 0.03 0.00 - 0.20 10*3/mm3    Immature Grans, Absolute 0.02 0.00 - 0.05 10*3/mm3    nRBC 0.0 0.0 - 0.2 /100 WBC   Urinalysis With Microscopic If Indicated (No Culture) - Urine, Clean Catch     Collection Time: 04/18/24 10:39 AM    Specimen: Urine, Clean Catch   Result Value Ref Range    Color, UA Yellow Yellow, Straw    Appearance, UA Clear Clear    pH, UA 6.5 5.0 - 8.0    Specific Gravity, UA 1.019 1.005 - 1.030    Glucose, UA Negative Negative    Ketones, UA Negative Negative    Bilirubin, UA Negative Negative    Blood, UA Negative Negative    Protein, UA Negative Negative    Leuk Esterase, UA Negative Negative    Nitrite, UA Negative Negative    Urobilinogen, UA 0.2 E.U./dL 0.2 - 1.0 E.U./dL   Urine Drug Screen - Urine, Clean Catch    Collection Time: 04/18/24 10:39 AM    Specimen: Urine, Clean Catch   Result Value Ref Range    THC, Screen, Urine Negative Negative    Phencyclidine (PCP), Urine Negative Negative    Cocaine Screen, Urine Negative Negative    Methamphetamine, Ur Negative Negative    Opiate Screen Positive (A) Negative    Amphetamine Screen, Urine Negative Negative    Benzodiazepine Screen, Urine Negative Negative    Tricyclic Antidepressants Screen Negative Negative    Methadone Screen, Urine Negative Negative    Barbiturates Screen, Urine Negative Negative    Oxycodone Screen, Urine Negative Negative    Buprenorphine, Screen, Urine Negative Negative   Fentanyl, Urine - Urine, Clean Catch    Collection Time: 04/18/24 10:39 AM    Specimen: Urine, Clean Catch   Result Value Ref Range    Fentanyl, Urine Negative Negative   Hemoglobin    Collection Time: 04/18/24  2:07 PM    Specimen: Blood   Result Value Ref Range    Hemoglobin 13.8 13.0 - 17.7 g/dL   Type & Screen    Collection Time: 04/18/24  2:07 PM    Specimen: Blood   Result Value Ref Range    ABO Type O     RH type Positive     Antibody Screen Negative     T&S Expiration Date 4/21/2024 11:59:59 PM    ABO RH Specimen Verification    Collection Time: 04/18/24  2:14 PM    Specimen: Blood   Result Value Ref Range    ABO Type O     RH type Positive      Note: In addition to lab results from this visit, the labs listed above may include  "labs taken at another facility or during a different encounter within the last 24 hours. Please correlate lab times with ED admission and discharge times for further clarification of the services performed during this visit.    CT Abdomen Pelvis Without Contrast   Final Result   1. No obvious inflammatory changes or bowel wall thickening.   2. No evidence of bowel obstruction   3. Nonobstructing right renal stones       This study was performed with techniques to keep radiation doses as low   as reasonably achievable (ALARA). Individualized dose reduction   techniques using automated exposure control or adjustment of vA and/or   kV according to the patient size were employed.        This report was signed and finalized on 4/18/2024 8:00 AM by Aly Tong MD.            Vitals:    04/18/24 1400 04/18/24 1401 04/18/24 1402 04/18/24 1444   BP: 159/96   139/95   BP Location:    Left arm   Patient Position:    Sitting   Pulse:    73   Resp:    16   Temp:    97.4 °F (36.3 °C)   TempSrc:    Oral   SpO2: 99% 100% 98% 98%   Weight:    70.5 kg (155 lb 6.8 oz)   Height:    177.8 cm (70\")     Medications   sodium chloride 0.9 % flush 10 mL (has no administration in time range)   sodium chloride 0.9 % infusion (0 mL/hr Intravenous Stopped 4/18/24 1037)   pantoprazole (PROTONIX) 40 mg in 100mL NS IVPB (8 mg/hr Intravenous New Bag 4/18/24 1338)   morphine injection 4 mg (4 mg Intravenous Given 4/18/24 0911)   hydroCHLOROthiazide oral 12.5 mg (12.5 mg Oral Given 4/18/24 1401)   metoprolol succinate XL (TOPROL-XL) 24 hr tablet 25 mg (25 mg Oral Given 4/18/24 1339)   losartan (COZAAR) tablet 50 mg (50 mg Oral Given 4/18/24 1401)   buprenorphine-naloxone (SUBOXONE) 8-2 MG film 1 film (1 film Sublingual Given 4/18/24 1339)   sodium chloride 0.9 % flush 10 mL (10 mL Intravenous Given 4/18/24 1340)   sodium chloride 0.9 % flush 10 mL (has no administration in time range)   sodium chloride 0.9 % infusion 40 mL (has no administration " in time range)   acetaminophen (TYLENOL) tablet 650 mg (650 mg Oral Given 4/18/24 1339)     Or   acetaminophen (TYLENOL) 160 MG/5ML oral solution 650 mg ( Oral Not Given:  See Alt 4/18/24 1339)     Or   acetaminophen (TYLENOL) suppository 650 mg ( Rectal Not Given:  See Alt 4/18/24 1339)   ondansetron (ZOFRAN) injection 4 mg (has no administration in time range)   nitroglycerin (NITROSTAT) SL tablet 0.4 mg (has no administration in time range)   sodium chloride 0.9 % infusion (100 mL/hr Intravenous New Bag 4/18/24 1338)   Potassium Replacement - Follow Nurse / BPA Driven Protocol (has no administration in time range)   Magnesium Standard Dose Replacement - Follow Nurse / BPA Driven Protocol (has no administration in time range)   Phosphorus Replacement - Follow Nurse / BPA Driven Protocol (has no administration in time range)   Calcium Replacement - Follow Nurse / BPA Driven Protocol (has no administration in time range)   sennosides-docusate (PERICOLACE) 8.6-50 MG per tablet 2 tablet (has no administration in time range)     And   polyethylene glycol (MIRALAX) packet 17 g (has no administration in time range)     And   bisacodyl (DULCOLAX) EC tablet 5 mg (has no administration in time range)     And   bisacodyl (DULCOLAX) suppository 10 mg (has no administration in time range)   melatonin tablet 5 mg (has no administration in time range)   polyethylene glycol (GoLYTELY) solution 2,000 mL (has no administration in time range)   nicotine (NICODERM CQ) 21 MG/24HR patch 1 patch (1 patch Transdermal Medication Applied 4/18/24 1507)   ondansetron (ZOFRAN) injection 4 mg (4 mg Intravenous Given 4/18/24 0723)   pantoprazole (PROTONIX) injection 40 mg (40 mg Intravenous Given 4/18/24 0723)   Morphine sulfate (PF) injection 2 mg (2 mg Intravenous Given 4/18/24 0723)   bisacodyl (DULCOLAX) EC tablet 20 mg (20 mg Oral Given 4/18/24 1339)     ECG/EMG Results (last 24 hours)       ** No results found for the last 24 hours. **           No orders to display                                      KATHY reviewed by Ashvin Kelley DO, Corbett, Jeremy J, MD       Medical Decision Making  Certainly must consider exacerbation of chronic condition/conditions.  Please consider bleeding ulcer, perforation, pancreatitis, small bowel obstruction.  Certainly could consider pneumonia pneumothorax and other lower pulmonic causes as well.  Must consider gastroenteritis, gastritis as well.  Will start by giving fluids and some medications and checking labs.  I do not believe advanced imaging warranted at this time.  Will also get GI consultation given recent evaluation and plan for further evaluation as an outpatient.    Problems Addressed:  Bright red blood per rectum: chronic illness or injury  Chronic abdominal pain: complicated acute illness or injury  PUD (peptic ulcer disease): chronic illness or injury    Amount and/or Complexity of Data Reviewed  External Data Reviewed: notes.  Labs: ordered. Decision-making details documented in ED Course.  Radiology: ordered.    Risk  Prescription drug management.  Decision regarding hospitalization.        Final diagnoses:   Chronic abdominal pain   PUD (peptic ulcer disease)   Bright red blood per rectum       ED Disposition  ED Disposition       ED Disposition   Decision to Admit    Condition   --    Comment   Level of Care: Telemetry [5]   Diagnosis: GI bleed [283428]   Admitting Physician: KERLEY, BRIAN JOSEPH [[id number]]   Attending Physician: KERLEY, BRIAN JOSEPH [[id number]]   Certification: I Certify That Inpatient Hospital Services Are Medically Necessary For Greater Than 2 Midnights                 Tigist Wheatley APRN  [address] [place] [address]  [phone]    In 1 week      Leonor Graff MD  [address] [facility] [address], Medical Office [facility] [address]  [place] [address]  [phone]    In 1 week           Medication List      No changes were made to your prescriptions during this  visit.            Mark Edward MD  04/18/24 1531      Electronically signed by Mark Edward MD at 04/18/24 1531       Vital Signs (last day) before discharge       Date/Time Temp Temp src Pulse Resp BP Patient Position SpO2    04/20/24 0727 97.3 (36.3) Oral 63 16 89/54 Lying 93    04/20/24 0320 97.8 (36.6) Oral -- 16 97/62 Sitting 95    04/19/24 2348 97.8 (36.6) Oral 77 16 110/68 Sitting 96    04/19/24 1922 98.4 (36.9) Axillary 78 16 101/66 Lying 95    04/19/24 1531 97.9 (36.6) Oral 76 18 121/82 Sitting 90    04/19/24 1446 98 (36.7) Oral 69 18 114/76 Lying 94    04/19/24 1420 -- -- 73 16 119/88 Lying 96    04/19/24 1415 -- -- 75 16 122/83 Lying 95    04/19/24 1410 -- -- 78 16 116/76 Lying 93    04/19/24 1405 -- -- 77 16 106/79 Lying 93    04/19/24 1400 -- -- 85 16 124/83 Lying 94    04/19/24 1355 -- -- 87 16 101/73 Lying 91    04/19/24 1351 97.6 (36.4) Temporal 87 16 83/45 Lying 92    04/19/24 1100 97.9 (36.6) Oral 84 16 106/66 Sitting 93    04/19/24 0700 98.8 (37.1) Oral 79 14 102/64 Sitting 95    04/19/24 0317 99.3 (37.4) Axillary 84 16 96/60 Lying 92    04/19/24 0000 -- -- -- -- 102/70 -- --          No current facility-administered medications for this encounter.     Current Outpatient Medications   Medication Sig Dispense Refill    nicotine (NICODERM CQ) 21 MG/24HR patch Place 1 patch on the skin as directed by provider Daily. 14 each 0    buprenorphine-naloxone (SUBOXONE) 8-2 MG per SL tablet Place 1.5 tablets under the tongue Daily.      hydroCHLOROthiazide 12.5 MG tablet Take 1 tablet by mouth Daily.      hydrOXYzine (ATARAX) 50 MG tablet TAKE 1 TABLET BY MOUTH THREE TIMES DAILY AS NEEDED FOR ANXIETY OR SLEEP 90 tablet 0    losartan (COZAAR) 50 MG tablet       metoprolol succinate XL (TOPROL-XL) 25 MG 24 hr tablet Take 1 tablet by mouth Daily.      naloxone (NARCAN) 4 MG/0.1ML nasal spray 1 spray into the nostril(s) as directed by provider As Needed (.). use for oversedation and call 911 1 each 2     ondansetron (Zofran) 4 MG tablet Take 1 tablet by mouth Every 8 (Eight) Hours As Needed for Nausea. 30 tablet 1    pantoprazole (Protonix) 40 MG EC tablet Take 1 tablet by mouth 2 (Two) Times a Day for 30 days. 60 tablet 0    senna 8.6 MG tablet Take 2 tablets by mouth Every Night. 60 tablet 5    sucralfate (Carafate) 1 g tablet Take 1 tablet by mouth 4 (Four) Times a Day for 10 days. Indications: Stomach Ulcer 40 tablet 0     Lab Results (last 24 hours)       Procedure Component Value Units Date/Time    Basic Metabolic Panel [[id number]]  (Abnormal) Collected: 04/20/24 0545    Specimen: Blood Updated: 04/20/24 0629     Glucose 85 mg/dL      BUN 14 mg/dL      Creatinine 0.72 mg/dL      Sodium 137 mmol/L      Potassium 4.1 mmol/L      Chloride 105 mmol/L      CO2 24.3 mmol/L      Calcium 8.5 mg/dL      BUN/Creatinine Ratio 19.4     Anion Gap 7.7 mmol/L      eGFR 112.0 mL/min/1.73     Narrative:      GFR Normal >60  Chronic Kidney Disease <60  Kidney Failure <15      CBC (No Diff) [[id number]]  (Abnormal) Collected: 04/20/24 0545    Specimen: Blood Updated: 04/20/24 0614     WBC 5.73 10*3/mm3      RBC 3.73 10*6/mm3      Hemoglobin 10.9 g/dL      Hematocrit 33.7 %      MCV 90.3 fL      MCH 29.2 pg      MCHC 32.3 g/dL      RDW 13.9 %      RDW-SD 46.4 fl      MPV 9.5 fL      Platelets 175 10*3/mm3     Hemoglobin [[id number]]  (Abnormal) Collected: 04/19/24 1238    Specimen: Blood Updated: 04/19/24 1244     Hemoglobin 11.6 g/dL           Imaging Results (Last 24 Hours)       ** No results found for the last 24 hours. **          Orders (last 24 hrs)        Start     Ordered    04/20/24 0000  Discharge Follow-up with Specified Provider: Dajuan; 6 Weeks         04/20/24 0947 04/20/24 0000  Diet: Gastrointestinal Diets; Low Irritant, Fiber-Restricted; Regular (IDDSI 7); Thin (IDDSI 0)         04/20/24 0947 04/20/24 0000  Activity as Tolerated         04/20/24 0947    04/19/24 0000  nicotine (NICODERM CQ) 21 MG/24HR  patch  Every 24 Hours Scheduled         24 1607    24 0000  pantoprazole (Protonix) 40 MG EC tablet  2 Times Daily         24 1607    24 0000  Discharge Follow-up with PCP         24 1607    24 1800  Oral Care  2 Times Daily,   Status:  Canceled       24 1325    24 1600  Vital Signs  Every 4 Hours,   Status:  Canceled       24 1325    24 1600  Strict Intake & Output  Every 4 Hours,   Status:  Canceled       24 1325    24 1400  Incentive Spirometry  Every 4 Hours While Awake,   Status:  Canceled       24 1325    24 1400  Vital Signs  Every 2 Hours,   Status:  Canceled       24 1325    Signed and Held  sodium chloride 0.9 % infusion  Continuous PRN,   Status:  Canceled         Signed and Held                  Physician Progress Notes (last 24 hours)  Notes from 24 1344 through 24 1344   No notes of this type exist for this encounter.       Consult Notes (last 24 hours)  Notes from 24 1344 through 24 1344   No notes of this type exist for this encounter.          Discharge Summary        Ashvin Kelley DO at [address] [place] [address]   DISCHARGE SUMMARY      Name:  Americo Davis Jr.   Age:  48 y.o.  Sex:  male  :  1975  MRN:  [id number]   Visit Number:  [id number]    Admission Date:  2024  Date of Discharge:  2024  Primary Care Physician:  Tigist Wheatley APRN      Discharge Diagnoses:     Bright red blood per rectum, GI bleed POA  History of gastric ulcer with hemorrhage  History of erosive gastropathy  Frequent NSAID use  Iron deficiency anemia  Hypertension  CAD  Depression/anxiety  Tobacco abuse    Problem List:     Active Hospital Problems    Diagnosis  POA    **GIB (gastrointestinal bleeding) [K92.2]  Yes    Hematochezia [K92.1]  Unknown    Upper abdominal pain [R10.10]  Unknown    Nausea [R11.0]  Unknown    GI bleed [K92.2]  Yes     PUD (peptic ulcer disease) [K27.9]  Unknown      Resolved Hospital Problems   No resolved problems to display.     Presenting Problem:    Chief Complaint   Patient presents with    Abdominal Pain      Consults:     Consulting Physician(s)         Provider   Role Specialty     Leonor Graff MD      Consulting Physician Gastroenterology          Procedures Performed:    Procedure(s):  ESOPHAGOGASTRODUODENOSCOPY with biopsy    History of presenting illness/Hospital Course:    Patient is a chronically ill 48-year-old male with history of erosive esophagitis with bleeding gastric ulcer, iron deficiency anemia, opioid abuse on Suboxone, and hypertension.  Patient presents from home with his mother for continued abdominal pain, nausea/vomiting.  Also notes bright red blood per rectum.  Patient follows with Dr. Barbara Mata.  Was seen in the office 4/11/2024 for complaints of abdominal pain with nausea/vomiting.  At the time it is noted that patient is continuing to take NSAIDs despite counseling.  Will take Aleve and ibuprofen nearly daily for chronic joint pain.  Patient again underwent counseling, changed Protonix to Nexium 40 mg daily.  He was also prescribed Carafate.  EGD was scheduled for which patient did not follow-up.  Most recent EGD performed 1/19/2024 which showed persistent erosive gastropathy.  Continues to smoke 1 pack/day.  Patient also continues to use NSAIDs daily.  GI bleed  -GI consult  -Patient refused to complete prep for colonoscopy.  -EGD performed confirming deep cratered ulcer with high risk for perforation.  -Continue Protonix drip transition to oral Protonix at discharge  - against NSAID use  -Stable H&H, no indication for PRBC transfusion    Tobacco abuse  -Nicotine patch  -Counseled on cessation    Vital Signs:    Temp:  [97.3 °F (36.3 °C)-98.4 °F (36.9 °C)] 97.3 °F (36.3 °C)  Heart Rate:  [63-87] 63  Resp:  [16-18] 16  BP: ()/(45-88) 89/54    Physical Exam:    General  Appearance:  Alert and cooperative.    Head:  Atraumatic and normocephalic.   Eyes: Conjunctivae and sclerae normal, no icterus. No pallor.   Ears:  Ears with no abnormalities noted.   Throat: No oral lesions, no thrush, oral mucosa moist.   Neck: Supple, trachea midline, no thyromegaly.   Back:   No kyphoscoliosis present. No tenderness to palpation.   Lungs:   Breath sounds heard bilaterally equally.  No crackles or wheezing. No Pleural rub or bronchial breathing.   Heart:  Normal S1 and S2, no murmur, no gallop, no rub. No JVD.   Abdomen:   Normal bowel sounds, no masses, no organomegaly. Soft, nontender, nondistended, no rebound tenderness.   Extremities: Supple, no edema, no cyanosis, no clubbing.   Pulses: Pulses palpable bilaterally.   Skin: No bleeding or rash.   Neurologic: Alert and oriented x 3. No facial asymmetry. Moves all four limbs. No tremors.     Pertinent Lab Results:     Results from last 7 days   Lab Units 04/20/24  0545 04/19/24  0541 04/18/24  0659   SODIUM mmol/L 137 136 140   POTASSIUM mmol/L 4.1 3.8 4.5   CHLORIDE mmol/L 105 103 103   CO2 mmol/L 24.3 23.2 21.9*   BUN mg/dL 14 15 26*   CREATININE mg/dL 0.72* 0.77 0.88   CALCIUM mg/dL 8.5* 8.8 10.0   BILIRUBIN mg/dL  --   --  <0.2   ALK PHOS U/L  --   --  77   ALT (SGPT) U/L  --   --  9   AST (SGOT) U/L  --   --  12   GLUCOSE mg/dL 85 88 126*     Results from last 7 days   Lab Units 04/20/24  0545 04/19/24  1238 04/19/24  0541 04/18/24  1407 04/18/24  0659   WBC 10*3/mm3 5.73  --  8.26  --  8.65   HEMOGLOBIN g/dL 10.9* 11.6* 12.2*   < > 14.6   HEMATOCRIT % 33.7*  --  37.4*  --  43.7   PLATELETS 10*3/mm3 175  --  221  --  249    < > = values in this interval not displayed.                     Results from last 7 days   Lab Units 04/18/24  0659   LIPASE U/L 82*               Pertinent Radiology Results:    Imaging Results (All)       Procedure Component Value Units Date/Time    CT Abdomen Pelvis Without Contrast [[id number]] Collected: 04/18/24  0756     Updated: 24 0802    Narrative:      PROCEDURE: CT ABDOMEN PELVIS WO CONTRAST-     HISTORY: GI bleed, concern for perf; R10.9-Unspecified abdominal pain;  G89.29-Other chronic pain; K27.9-Peptic ulcer, site unspecified,  unspecified as acute or chronic, without hemorrhage or perforation     COMPARISON: 2023     Note: Noncontrast exam is less sensitive for assessment of the bowel and  solid abdominal organs     TECHNIQUE: Noncontrast exam      CT ABDOMEN: Nonobstructing right renal stones are seen within the  calyces largest measuring up to 6 mm similar to prior exam. Otherwise  solid organs are grossly unremarkable. The bowel shows no evidence of  obstruction. There is no free air or free fluid within the abdomen.  Gallbladder is normal. Mild fecal impaction of the colon is present. No  gross bowel wall thickening is appreciated.     CT PELVIS: No bowel dilatation is seen. There is no free fluid. Appendix  is nonvisualized. No secondary findings of appendicitis are seen.  Prostate is minimally enlarged..       Impression:      1. No obvious inflammatory changes or bowel wall thickening.  2. No evidence of bowel obstruction  3. Nonobstructing right renal stones     This study was performed with techniques to keep radiation doses as low  as reasonably achievable (ALARA). Individualized dose reduction  techniques using automated exposure control or adjustment of vA and/or  kV according to the patient size were employed.      This report was signed and finalized on 2024 8:00 AM by Aly Tong MD.               Echo:    Results for orders placed in visit on 14    CONVERTED (HISTORICAL) ECHO    Narrative  Patient:      TAYLOR OHARA  Med Rec#:     [id number]               :          1975  Date:         2014            Age:          38y  Height:       152.4 cm / 60.0 in  Weight:       74.84 kg / 164.9 lbs  Sex:          M                     BSA:          1.72  Room#:         [facility]    Sonographer:  Orlin Darden [facility]  Referring:    CHRISTIANO  Reading:      JOSE GUADALUPE Can MD  Primary:      Scar Serrato MD  ______________________________________________________________________    Transthoracic Echocardiogram    Indication:  Chest Pain, SOA  BP:           140/84  HR:           76    Conclusions  1. The estimated ejection fraction is 55-60%.  Global left ventricular  wall motion and contractility are within normal limits.  2. The right ventricular systolic pressure is calculated at 23 mmHg.    Findings  Technical Comments:  The study quality is good.    Left Ventricle:  The left ventricular chamber size is normal. Global left ventricular  wall motion and contractility are within normal limits. Left ventricular  systolic function is at the lower limits of normal.  The estimated  ejection fraction is 55-60%.    Left Atrium:  The left atrial chamber size is normal.    Right Ventricle:  The right ventricular cavity size is normal. The right ventricular  global systolic function is normal.    Right Atrium:  The right atrial cavity size is normal. No atrial septal defect is  visualized.    Aortic Valve:  The aortic valve is trileaflet. There is no evidence of aortic  regurgitation. There is no evidence of aortic stenosis.    Mitral Valve:  The mitral valve leaflets appear normal. There is no evidence of mitral  valve prolapse. There is a trace of mitral regurgitation. There is no  evidence of mitral stenosis.  Tricuspid Valve:  The tricuspid valve leaflets are normal.  There is mild tricuspid  regurgitation. The right ventricular systolic pressure is calculated at  23 mmHg.    Pulmonic Valve:  The pulmonic valve appears normal.    Pericardium:  There is no evidence of pericardial effusion.    Aorta:  There is no dilatation of the aortic root.    Pulmonary Artery:  The main pulmonary artery appears normal.    Venous:  The venous system appears normal.    Measurements  Chambers  Name                     Value  RVIDd (AP) 2D           3.03 cm  IVSd (2D)               0.88 cm  LVIDd (2D)              5.57 cm  LVIDs (2D)              3.96 cm  LVPWd (2D)              0.9 cm  EF (2D)                 55.1 %  Ao root diameter (2D)   3.1 cm  LA dimension (AP) 2D    3.2 cm  LA:Ao ratio (2D)        1.03 ratio    Diastolic/Systolic Function  Name                    Value  LV lateral e' Vmax      0.21 m/sec    Tricuspid Valve  Name                    Value  TR Vmax                 1.77 m/sec  TR peak gradient        13 mmHg  RVSP                    23 mmHg    Electronically signed by: JOSE GUADALUPE Can MD on 02/12/2014 18:20:06    Condition on Discharge:      Stable.    Code status during the hospital stay:    Code Status and Medical Interventions:   Ordered at: 04/18/24 0946     Code Status (Patient has no pulse and is not breathing):    CPR (Attempt to Resuscitate)     Medical Interventions (Patient has pulse or is breathing):    Full Support     Discharge Disposition:    Home or Self Care    Discharge Medications:       Discharge Medications        New Medications        Instructions Start Date   Nicotine Step 1 21 MG/24HR patch  Generic drug: nicotine   1 patch, Transdermal, Every 24 Hours Scheduled      pantoprazole 40 MG EC tablet  Commonly known as: Protonix   40 mg, Oral, 2 Times Daily             Continue These Medications        Instructions Start Date   buprenorphine-naloxone 8-2 MG per SL tablet  Commonly known as: SUBOXONE   1.5 tablets, Sublingual, Daily      hydroCHLOROthiazide 12.5 MG tablet   1 tablet, Oral, Daily      hydrOXYzine 50 MG tablet  Commonly known as: ATARAX   TAKE 1 TABLET BY MOUTH THREE TIMES DAILY AS NEEDED FOR ANXIETY OR SLEEP      losartan 50 MG tablet  Commonly known as: COZAAR   No dose, route, or frequency recorded.      metoprolol succinate XL 25 MG 24 hr tablet  Commonly known as: TOPROL-XL   25 mg, Oral, Daily      naloxone 4 MG/0.1ML nasal spray  Commonly known as: NARCAN   1 spray,  Nasal, As Needed, use for oversedation and call 911      ondansetron 4 MG tablet  Commonly known as: Zofran   4 mg, Oral, Every 8 Hours PRN      senna 8.6 MG tablet  Commonly known as: SENOKOT   2 tablets, Oral, Nightly      sucralfate 1 g tablet  Commonly known as: Carafate   1 g, Oral, 4 Times Daily             Stop These Medications      cyclobenzaprine 10 MG tablet  Commonly known as: FLEXERIL     esomeprazole 40 MG capsule  Commonly known as: nexIUM     PARoxetine 20 MG tablet  Commonly known as: PAXIL            Discharge Diet:     Diet Instructions       Diet: Gastrointestinal Diets; Low Irritant, Fiber-Restricted; Regular (IDDSI 7); Thin (IDDSI 0)      Discharge Diet: Gastrointestinal Diets    Gastrointestinal Diet:  Low Irritant  Fiber-Restricted       Texture: Regular (IDDSI 7)    Fluid Consistency: Thin (IDDSI 0)          Activity at Discharge:     Activity Instructions       Activity as Tolerated            Follow-up Appointments:    Additional Instructions for the Follow-ups that You Need to Schedule       Discharge Follow-up with PCP   As directed       Currently Documented PCP:    Tigist Wheatley APRN    PCP Phone Number:    [phone]     Follow Up Details: 1 week        Discharge Follow-up with Specified Provider: Dajuan; 6 Weeks   As directed      To: Dajuan   Follow Up: 6 Weeks               Follow-up Information       Ly Darden APRN. Go on 4/26/2024.    Specialty: Nurse Practitioner  Why: 02:20pm  Contact information:  [address] [place] [address]  [phone]               Leonor Graff MD Follow up on 7/24/2024.    Specialty: Gastroenterology  Why: 03:15pm  Contact information:  [address]  [place] [address]  [phone]                           Future Appointments   Date Time Provider Department Center   7/24/2024  3:15 PM Leonor Graff MD E  [facility]     Test Results Pending at Discharge:    Pending  Labs       Order Current Status    TISSUE EXAM, [facility] ([facility]) Collected (04/19/24 4758)               Ashvin Kelley DO  04/20/24  12:00 EDT    Time: I spent >30 minutes on this discharge activity which included: face-to-face encounter with the patient, reviewing the data in the system, coordination of the care with the nursing staff as well as consultants, documentation, and entering orders.     Dictated utilizing Dragon dictation.        Electronically signed by Ashvin Kelley DO at 04/20/24 3858

## 2024-04-22 DIAGNOSIS — Z12.11 ENCOUNTER FOR SCREENING FOR MALIGNANT NEOPLASM OF COLON: Primary | ICD-10-CM

## 2024-04-22 RX ORDER — SODIUM CHLORIDE 9 MG/ML
30 INJECTION, SOLUTION INTRAVENOUS CONTINUOUS PRN
OUTPATIENT
Start: 2024-04-22

## 2024-04-23 LAB — REF LAB TEST METHOD: NORMAL

## 2024-04-30 ENCOUNTER — READMISSION MANAGEMENT (OUTPATIENT)
Dept: CALL CENTER | Facility: HOSPITAL | Age: 49
End: 2024-04-30
Payer: MEDICAID

## 2024-04-30 NOTE — OUTREACH NOTE
Medical Week 2 Survey      Flowsheet Row Responses   [facility] patient discharged from? [place]   Does the patient have one of the following disease processes/diagnoses(primary or secondary)? Other   Week 2 attempt successful? Yes   Call start time 1730   Discharge diagnosis *GIB (gastrointestinal bleeding   Call end time 1732   Person spoke with today (if not patient) and relationship pt   Meds reviewed with patient/caregiver? Yes   Is the patient having any side effects they believe may be caused by any medication additions or changes? No   Does the patient have all medications ordered at discharge? Yes   Is the patient taking all medications as directed (includes completed medication regime)? Yes   Does the patient have a primary care provider?  Yes   Does the patient have an appointment with their PCP within 7 days of discharge? Yes   Has the patient kept scheduled appointments due by today? Yes   Psychosocial issues? No   What is the patient's perception of their health status since discharge? Improving   Is the patient/caregiver able to teach back signs and symptoms related to disease process for when to call PCP? Yes   Is the patient/caregiver able to teach back signs and symptoms related to disease process for when to call 911? Yes   Is the patient/caregiver able to teach back the hierarchy of who to call/visit for symptoms/problems? PCP, Specialist, Home health nurse, Urgent Care, ED, 911 Yes   Week 2 Call Completed? Yes   Is the patient interested in additional calls from an ambulatory ? No   Would this patient benefit from a Referral to Amb Social Work? No   Wrap up additional comments Pt states he is doing better, and denies abdominal pain/blood with BMs. No medication issues. Pt had PCP fu appt, and verified colonoscopy 5/1/24.   Call end time 1732            Kimberly GUZMAN - Registered Nurse

## 2024-05-08 ENCOUNTER — TELEPHONE (OUTPATIENT)
Dept: GASTROENTEROLOGY | Facility: CLINIC | Age: 49
End: 2024-05-08
Payer: MEDICAID

## 2024-05-09 ENCOUNTER — READMISSION MANAGEMENT (OUTPATIENT)
Dept: CALL CENTER | Facility: HOSPITAL | Age: 49
End: 2024-05-09
Payer: MEDICAID

## 2024-11-07 ENCOUNTER — LAB (OUTPATIENT)
Dept: LAB | Facility: HOSPITAL | Age: 49
End: 2024-11-07
Payer: MEDICAID

## 2024-11-07 ENCOUNTER — OFFICE VISIT (OUTPATIENT)
Dept: GASTROENTEROLOGY | Facility: CLINIC | Age: 49
End: 2024-11-07
Payer: MEDICAID

## 2024-11-07 VITALS
DIASTOLIC BLOOD PRESSURE: 77 MMHG | HEART RATE: 94 BPM | TEMPERATURE: 98.4 F | HEIGHT: 70 IN | BODY MASS INDEX: 21.76 KG/M2 | WEIGHT: 152 LBS | SYSTOLIC BLOOD PRESSURE: 109 MMHG

## 2024-11-07 DIAGNOSIS — D50.0 IRON DEFICIENCY ANEMIA DUE TO CHRONIC BLOOD LOSS: ICD-10-CM

## 2024-11-07 DIAGNOSIS — K59.03 DRUG INDUCED CONSTIPATION: ICD-10-CM

## 2024-11-07 DIAGNOSIS — D50.0 ANEMIA, BLOOD LOSS: ICD-10-CM

## 2024-11-07 DIAGNOSIS — K21.9 GASTROESOPHAGEAL REFLUX DISEASE WITHOUT ESOPHAGITIS: ICD-10-CM

## 2024-11-07 DIAGNOSIS — K27.9 PUD (PEPTIC ULCER DISEASE): Primary | ICD-10-CM

## 2024-11-07 DIAGNOSIS — R11.0 NAUSEA: ICD-10-CM

## 2024-11-07 LAB
BASOPHILS # BLD AUTO: 0.03 10*3/MM3 (ref 0–0.2)
BASOPHILS NFR BLD AUTO: 0.4 % (ref 0–1.5)
DEPRECATED RDW RBC AUTO: 41.9 FL (ref 37–54)
EOSINOPHIL # BLD AUTO: 0.25 10*3/MM3 (ref 0–0.4)
EOSINOPHIL NFR BLD AUTO: 3.7 % (ref 0.3–6.2)
ERYTHROCYTE [DISTWIDTH] IN BLOOD BY AUTOMATED COUNT: 13.5 % (ref 12.3–15.4)
HCT VFR BLD AUTO: 34 % (ref 37.5–51)
HGB BLD-MCNC: 11.4 G/DL (ref 13–17.7)
IMM GRANULOCYTES # BLD AUTO: 0.02 10*3/MM3 (ref 0–0.05)
IMM GRANULOCYTES NFR BLD AUTO: 0.3 % (ref 0–0.5)
LYMPHOCYTES # BLD AUTO: 2.84 10*3/MM3 (ref 0.7–3.1)
LYMPHOCYTES NFR BLD AUTO: 42.4 % (ref 19.6–45.3)
MCH RBC QN AUTO: 28.8 PG (ref 26.6–33)
MCHC RBC AUTO-ENTMCNC: 33.5 G/DL (ref 31.5–35.7)
MCV RBC AUTO: 85.9 FL (ref 79–97)
MONOCYTES # BLD AUTO: 0.42 10*3/MM3 (ref 0.1–0.9)
MONOCYTES NFR BLD AUTO: 6.3 % (ref 5–12)
NEUTROPHILS NFR BLD AUTO: 3.14 10*3/MM3 (ref 1.7–7)
NEUTROPHILS NFR BLD AUTO: 46.9 % (ref 42.7–76)
NRBC BLD AUTO-RTO: 0 /100 WBC (ref 0–0.2)
PLATELET # BLD AUTO: 259 10*3/MM3 (ref 140–450)
PMV BLD AUTO: 10 FL (ref 6–12)
RBC # BLD AUTO: 3.96 10*6/MM3 (ref 4.14–5.8)
WBC NRBC COR # BLD AUTO: 6.7 10*3/MM3 (ref 3.4–10.8)

## 2024-11-07 PROCEDURE — 1160F RVW MEDS BY RX/DR IN RCRD: CPT | Performed by: INTERNAL MEDICINE

## 2024-11-07 PROCEDURE — 3078F DIAST BP <80 MM HG: CPT | Performed by: INTERNAL MEDICINE

## 2024-11-07 PROCEDURE — 1159F MED LIST DOCD IN RCRD: CPT | Performed by: INTERNAL MEDICINE

## 2024-11-07 PROCEDURE — 36415 COLL VENOUS BLD VENIPUNCTURE: CPT

## 2024-11-07 PROCEDURE — 85025 COMPLETE CBC W/AUTO DIFF WBC: CPT

## 2024-11-07 PROCEDURE — 99214 OFFICE O/P EST MOD 30 MIN: CPT | Performed by: INTERNAL MEDICINE

## 2024-11-07 PROCEDURE — 3074F SYST BP LT 130 MM HG: CPT | Performed by: INTERNAL MEDICINE

## 2024-11-07 RX ORDER — PANTOPRAZOLE SODIUM 40 MG/1
1 TABLET, DELAYED RELEASE ORAL DAILY
COMMUNITY
Start: 2024-11-05 | End: 2024-11-07 | Stop reason: SDUPTHER

## 2024-11-07 RX ORDER — SENNOSIDES A AND B 8.6 MG/1
2 TABLET, FILM COATED ORAL NIGHTLY
Qty: 180 TABLET | Refills: 3 | Status: SHIPPED | OUTPATIENT
Start: 2024-11-07

## 2024-11-07 RX ORDER — FERROUS GLUCONATE 324(38)MG
324 TABLET ORAL
Qty: 90 TABLET | Refills: 3 | Status: SHIPPED | OUTPATIENT
Start: 2024-11-07

## 2024-11-07 RX ORDER — FAMOTIDINE 40 MG/1
40 TABLET, FILM COATED ORAL NIGHTLY
Qty: 90 TABLET | Refills: 3 | Status: SHIPPED | OUTPATIENT
Start: 2024-11-07

## 2024-11-07 RX ORDER — PANTOPRAZOLE SODIUM 40 MG/1
40 TABLET, DELAYED RELEASE ORAL DAILY
Qty: 90 TABLET | Refills: 3 | Status: SHIPPED | OUTPATIENT
Start: 2024-11-07

## 2024-11-07 RX ORDER — ONDANSETRON 4 MG/1
4 TABLET, FILM COATED ORAL EVERY 8 HOURS PRN
Qty: 30 TABLET | Refills: 1 | Status: SHIPPED | OUTPATIENT
Start: 2024-11-07

## 2024-11-07 NOTE — H&P (VIEW-ONLY)
Follow Up Note     Date: 2024   Patient Name: Americo Davis Jr.  MRN: [id number]  : 1975     Referring Physician: Tigist Wheatley APRN    Chief Complaint:    Chief Complaint   Patient presents with    Constipation    Peptic Ulcer Disease    Anemia       Interval History:   2024  Americo Davis Jr. is a 49 y.o. male who is here today for follow up for his peptic ulcer disease and after his recent EGD.  Patient states that he still has a significant upper abdominal burning reflux symptoms and nausea.  He had to take occasional evening dose of PPI.  He developed constipation again has he did not feel as senna.  He denies any black stool or red blood in the stool.  He continues to smoke and is also on Suboxone.    24  This is a 48-year-old male patient with gastroesophageal reflux disease.  Esophagitis, prior history of peptic ulcer disease, chronic deficiency anemia, long-term NSAID use, COPD, hypertension, hyperlipidemia, coronary artery disease, anxiety depression, was brought in emergency room this morning with a complaints of worsening abdominal pain and an episode of bright red rectal bleeding this morning     Patient just had a morphine for his abdominal pain and is very lethargic and drowsy unable to get any information from him.  His mom is at bedside and of history obtained.  She brought him to the emergency room this morning with worsening abdominal pain and episode of bright red rectal bleed this morning and associated with nausea vomiting without any blood in the vomitus..  He does have a ongoing nausea vomiting which is chronic.  Does have a chronic abdominal pain mostly mid and upper abdomen but has worsened in the last couple of weeks.  He also had bowel movement with small amount of red blood this morning.  There was no black stools.     He had a multiple EGDs done in the last 2 to 3 years time.  Patient has been taking ibuprofen, Aleve, Excedrin despite  recommended to stop.  He continues to smoke.  He continues to drink excessive pops without changing his diet.  Seen in the office 2 weeks ago for the similar symptoms.  His prior EGDs revealed erosive esophagitis and also recurrent peptic ulcer disease.  Last EGD wasIn January 2024 that again revealed erosive gastropathy with a healed ulcer scars.  Patient's last colonoscopy was in 2020 and had polyps removed.  Present note.    4/11/2024  Americo Davis Jr. is a 48 y.o. male who is here today for follow up for his ongoing abdominal pain nausea vomiting.  He states that he continues to have a worsening abdominal pain mostly in the upper abdomen and the left side abdomen with nausea and vomiting.  He could not stop his NSAIDs he is still taking ibuprofen Aleve and Excedrin as needed for his migraine headache.  He continues to smoke.  Continues to drink excessive pops.  He did not change any of his diet.  States that he had to come to emergency room in December and Protonix given is not working for him.     5/20/2021  He had an EGD at [facility] in [place] in Jan 2021 by Dr. Ricks and was told he had a bleeding ulcer (not sure of exact location). He was given a blood transfusion at that time and was placed on protonix 40 mg BID but insurance did not cover protonix. He has been taking prilosec 20 mg once daily otc. He has not been having heartburn unless he eats spicy foods. He denies any current abdominal pain. No melena. No rectal bleeding. Normal bowel movements brown in color, occurring every 2-3 days. Reports severe fatigue and relates this to his lung disease, still smoking and uses oxygen at night. He had labs last a couple of weeks ago, not sure of those results. Not currently taking any iron supplement.      Had colonoscopy last year in 2020, had 1 colon polyp removed. He was told to have another colonoscopy in 5 years. He cannot recall the provider's name who completed this. There is no known family history of  "colon cancer or colon polyps.       Subjective      Past Medical History:   Past Medical History:   Diagnosis Date    Anemia     Anxiety     Asthma     Back pain     Bleeding ulcer 04/2024    Body piercing 10/06/2021    ears    CHF (congestive heart failure)     Reported by patient's wife    Chronic venous insufficiency     Colitis     Constipation     COPD (chronic obstructive pulmonary disease)     [place] or callus     Coronary artery disease     Depression     GERD (gastroesophageal reflux disease)     GI bleed     secondary to gastric ulcer - hospitalized 4/18-4/20/24    Gout     Headache     History of ASCVD (atherosclerotic cardiovascular disease)     History of blood transfusion     Spouse reported no reaction to transfuson     History of fracture 10/06/2021    Hx right pinky fracture - did require surgery    History of heart attack     Spouse reported \"2 light heart attacks at age 30\"     History of stomach ulcers     Hyperlipidemia     Hypertension     Migraine headache     Mild aortic valve sclerosis     Mild mitral insufficiency     Spouse reported MVP and that Dr Preciado said not sever enough to warrant surgery    On home oxygen therapy     Spouse reported at 2L/min NC prn    Peptic ulcer disease     Poor historian 10/06/2021    Snores     Tattoos     Vision problems     Weight loss     Recent Weight Loss Involuntary For Over A Year Or More     Past Surgical History:   Past Surgical History:   Procedure Laterality Date    APPENDECTOMY      COLONOSCOPY      ENDOSCOPY      ENDOSCOPY N/A 11/5/2021    Procedure: ESOPHAGOGASTRODUODENOSCOPY WITH BIOPSY;  Surgeon: Leonor Graff MD;  Location: [facility] ENDOSCOPY;  Service: Gastroenterology;  Laterality: N/A;    ENDOSCOPY N/A 1/19/2023    Procedure: ESOPHAGOGASTRODUODENOSCOPY WITH BIOPSIES;  Surgeon: Leonor Graff MD;  Location: [facility] ENDOSCOPY;  Service: Gastroenterology;  Laterality: N/A;    ENDOSCOPY N/A 4/19/2024    Procedure: " ESOPHAGOGASTRODUODENOSCOPY with biopsy;  Surgeon: Leonor Graff MD;  Location: [facility] ENDOSCOPY;  Service: Gastroenterology;  Laterality: N/A;    FRACTURE SURGERY Right     Pinky finger    WISDOM TOOTH EXTRACTION         Family History:   Family History   Problem Relation Age of Onset    Arthritis Mother     Thyroid disease Mother     Hypertension Mother     Migraines Sister     Thyroid disease Sister     Hypertension Sister     Mental illness Brother     Cancer Paternal Uncle     Arthritis Maternal Grandmother     Stroke Maternal Grandmother     Colon cancer Neg Hx     Cirrhosis Neg Hx     Liver cancer Neg Hx     Liver disease Neg Hx        Social History:   Social History     Socioeconomic History    Marital status:    Tobacco Use    Smoking status: Every Day     Current packs/day: 0.50     Average packs/day: 0.5 packs/day for 33.8 years (16.9 ttl pk-yrs)     Types: Cigarettes     Start date: 1991     Passive exposure: Current    Smokeless tobacco: Current     Types: Snuff   Vaping Use    Vaping status: Some Days    Substances: Nicotine (Spouse reported no use in 2 years), Flavoring    Devices: Disposable   Substance and Sexual Activity    Alcohol use: Not Currently     Comment: social    Drug use: Not Currently     Comment: opiates, snort    Sexual activity: Defer       Medications:     Current Outpatient Medications:     buprenorphine-naloxone (SUBOXONE) 8-2 MG per SL tablet, Place 2 tablets under the tongue Daily., Disp: , Rfl:     cyclobenzaprine (FLEXERIL) 10 MG tablet, Take 1 tablet by mouth 3 times a day., Disp: , Rfl:     hydrOXYzine (ATARAX) 25 MG tablet, Take 1 tablet by mouth every night at bedtime., Disp: , Rfl:     losartan (COZAAR) 50 MG tablet, , Disp: , Rfl:     metoprolol succinate XL (TOPROL-XL) 25 MG 24 hr tablet, Take 1 tablet by mouth Daily., Disp: , Rfl:     naloxone (NARCAN) 4 MG/0.1ML nasal spray, 1 spray into the nostril(s) as directed by provider As Needed (.). use for  "oversedation and call 911, Disp: 1 each, Rfl: 2    pantoprazole (PROTONIX) 40 MG EC tablet, Take 1 tablet by mouth Daily. Taking 2 per day, Disp: , Rfl:     senna 8.6 MG tablet, Take 2 tablets by mouth Every Night., Disp: 60 tablet, Rfl: 5    hydroCHLOROthiazide 12.5 MG tablet, Take 1 tablet by mouth Daily. (Patient not taking: Reported on 11/7/2024), Disp: , Rfl:     nicotine (NICODERM CQ) 21 MG/24HR patch, Place 1 patch on the skin as directed by provider Daily. (Patient not taking: Reported on 11/7/2024), Disp: 14 each, Rfl: 0    ondansetron (Zofran) 4 MG tablet, Take 1 tablet by mouth Every 8 (Eight) Hours As Needed for Nausea. (Patient not taking: Reported on 11/7/2024), Disp: 30 tablet, Rfl: 1    Allergies:   Allergies   Allergen Reactions    Sulfa Antibiotics Other (See Comments)     \"causes his insides to burn and his skin gets really red\" reported by patient's spouse        Review of Systems:   Review of Systems   Constitutional:  Positive for appetite change, fatigue and unexpected weight loss. Negative for fever.   HENT:  Negative for trouble swallowing.    Gastrointestinal:  Positive for abdominal pain, constipation, nausea, vomiting (Isolated 3-4 day episode), GERD and indigestion. Negative for abdominal distention, anal bleeding, blood in stool, diarrhea and rectal pain.       The following portions of the patient's history were reviewed and updated as appropriate: allergies, current medications, past family history, past medical history, past social history, past surgical history and problem list.    Objective     Physical Exam:  Vital Signs:   Vitals:    11/07/24 1603   BP: 109/77   Pulse: 94   Temp: 98.4 °F (36.9 °C)   TempSrc: Infrared   Weight: 68.9 kg (152 lb)  Comment: with clothing/shoes   Height: 177.8 cm (70\")  Comment: per EPIC       Physical Exam  Constitutional:       Appearance: Normal appearance.   HENT:      Head: Normocephalic and atraumatic.   Eyes:      Conjunctiva/sclera: " Conjunctivae normal.   Abdominal:      General: Abdomen is flat. There is no distension.      Palpations: There is no mass.      Tenderness: There is no abdominal tenderness (Diffuse upper abdominal discomfort on palpation). There is no guarding or rebound.      Hernia: No hernia is present.   Musculoskeletal:      Cervical back: Normal range of motion and neck supple.   Neurological:      Mental Status: He is alert.         Results Review:   I reviewed the patient's new clinical results.    No visits with results within 90 Day(s) from this visit.   Latest known visit with results is:   Admission on 04/18/2024, Discharged on 04/20/2024   Component Date Value Ref Range Status    Glucose 04/18/2024 126 (H)  65 - 99 mg/dL Final    BUN 04/18/2024 26 (H)  6 - 20 mg/dL Final    Creatinine 04/18/2024 0.88  0.76 - 1.27 mg/dL Final    Sodium 04/18/2024 140  136 - 145 mmol/L Final    Potassium 04/18/2024 4.5  3.5 - 5.2 mmol/L Final    Chloride 04/18/2024 103  98 - 107 mmol/L Final    CO2 04/18/2024 21.9 (L)  22.0 - 29.0 mmol/L Final    Calcium 04/18/2024 10.0  8.6 - 10.5 mg/dL Final    Total Protein 04/18/2024 7.7  6.0 - 8.5 g/dL Final    Albumin 04/18/2024 4.2  3.5 - 5.2 g/dL Final    ALT (SGPT) 04/18/2024 9  1 - 41 U/L Final    AST (SGOT) 04/18/2024 12  1 - 40 U/L Final    Alkaline Phosphatase 04/18/2024 77  39 - 117 U/L Final    Total Bilirubin 04/18/2024 <0.2  0.0 - 1.2 mg/dL Final    Globulin 04/18/2024 3.5  gm/dL Final    A/G Ratio 04/18/2024 1.2  g/dL Final    BUN/Creatinine Ratio 04/18/2024 29.5 (H)  7.0 - 25.0 Final    Anion Gap 04/18/2024 15.1 (H)  5.0 - 15.0 mmol/L Final    eGFR 04/18/2024 105.4  >60.0 mL/min/1.73 Final    Lipase 04/18/2024 82 (H)  13 - 60 U/L Final    Color, UA 04/18/2024 Yellow  Yellow, Straw Final    Appearance, UA 04/18/2024 Clear  Clear Final    pH, UA 04/18/2024 6.5  5.0 - 8.0 Final    Specific Gravity, UA 04/18/2024 1.019  1.005 - 1.030 Final    Glucose, UA 04/18/2024 Negative  Negative  Final    Ketones, UA 04/18/2024 Negative  Negative Final    Bilirubin, UA 04/18/2024 Negative  Negative Final    Blood, UA 04/18/2024 Negative  Negative Final    Protein, UA 04/18/2024 Negative  Negative Final    Leuk Esterase, UA 04/18/2024 Negative  Negative Final    Nitrite, UA 04/18/2024 Negative  Negative Final    Urobilinogen, UA 04/18/2024 0.2 E.U./dL  0.2 - 1.0 E.U./dL Final    Extra Tube 04/18/2024 Hold for add-ons.   Final    Auto resulted.    Extra Tube 04/18/2024 hold for add-on   Final    Auto resulted    Extra Tube 04/18/2024 Hold for add-ons.   Final    Auto resulted.    Extra Tube 04/18/2024 Hold for add-ons.   Final    Auto resulted    WBC 04/18/2024 8.65  3.40 - 10.80 10*3/mm3 Final    RBC 04/18/2024 4.91  4.14 - 5.80 10*6/mm3 Final    Hemoglobin 04/18/2024 14.6  13.0 - 17.7 g/dL Final    Hematocrit 04/18/2024 43.7  37.5 - 51.0 % Final    MCV 04/18/2024 89.0  79.0 - 97.0 fL Final    MCH 04/18/2024 29.7  26.6 - 33.0 pg Final    MCHC 04/18/2024 33.4  31.5 - 35.7 g/dL Final    RDW 04/18/2024 14.0  12.3 - 15.4 % Final    RDW-SD 04/18/2024 45.3  37.0 - 54.0 fl Final    MPV 04/18/2024 9.3  6.0 - 12.0 fL Final    Platelets 04/18/2024 249  140 - 450 10*3/mm3 Final    Neutrophil % 04/18/2024 55.7  42.7 - 76.0 % Final    Lymphocyte % 04/18/2024 34.6  19.6 - 45.3 % Final    Monocyte % 04/18/2024 6.4  5.0 - 12.0 % Final    Eosinophil % 04/18/2024 2.8  0.3 - 6.2 % Final    Basophil % 04/18/2024 0.3  0.0 - 1.5 % Final    Immature Grans % 04/18/2024 0.2  0.0 - 0.5 % Final    Neutrophils, Absolute 04/18/2024 4.82  1.70 - 7.00 10*3/mm3 Final    Lymphocytes, Absolute 04/18/2024 2.99  0.70 - 3.10 10*3/mm3 Final    Monocytes, Absolute 04/18/2024 0.55  0.10 - 0.90 10*3/mm3 Final    Eosinophils, Absolute 04/18/2024 0.24  0.00 - 0.40 10*3/mm3 Final    Basophils, Absolute 04/18/2024 0.03  0.00 - 0.20 10*3/mm3 Final    Immature Grans, Absolute 04/18/2024 0.02  0.00 - 0.05 10*3/mm3 Final    nRBC 04/18/2024 0.0  0.0 - 0.2  /100 WBC Final    THC, Screen, Urine 04/18/2024 Negative  Negative Final    Phencyclidine (PCP), Urine 04/18/2024 Negative  Negative Final    Cocaine Screen, Urine 04/18/2024 Negative  Negative Final    Methamphetamine, Ur 04/18/2024 Negative  Negative Final    Opiate Screen 04/18/2024 Positive (A)  Negative Final    Amphetamine Screen, Urine 04/18/2024 Negative  Negative Final    Benzodiazepine Screen, Urine 04/18/2024 Negative  Negative Final    Tricyclic Antidepressants Screen 04/18/2024 Negative  Negative Final    Methadone Screen, Urine 04/18/2024 Negative  Negative Final    Barbiturates Screen, Urine 04/18/2024 Negative  Negative Final    Oxycodone Screen, Urine 04/18/2024 Negative  Negative Final    Buprenorphine, Screen, Urine 04/18/2024 Negative  Negative Final    Fentanyl, Urine 04/18/2024 Negative  Negative Final    Hemoglobin 04/18/2024 13.8  13.0 - 17.7 g/dL Final    Hemoglobin 04/18/2024 12.6 (L)  13.0 - 17.7 g/dL Final    ABO Type 04/18/2024 O   Final    RH type 04/18/2024 Positive   Final    Antibody Screen 04/18/2024 Negative   Final    T&S Expiration Date 04/18/2024 4/21/2024 11:59:59 PM   Final    ABO Type 04/18/2024 O   Final    RH type 04/18/2024 Positive   Final    Hemoglobin 04/19/2024 12.2 (L)  13.0 - 17.7 g/dL Final    Glucose 04/19/2024 88  65 - 99 mg/dL Final    BUN 04/19/2024 15  6 - 20 mg/dL Final    Creatinine 04/19/2024 0.77  0.76 - 1.27 mg/dL Final    Sodium 04/19/2024 136  136 - 145 mmol/L Final    Potassium 04/19/2024 3.8  3.5 - 5.2 mmol/L Final    Chloride 04/19/2024 103  98 - 107 mmol/L Final    CO2 04/19/2024 23.2  22.0 - 29.0 mmol/L Final    Calcium 04/19/2024 8.8  8.6 - 10.5 mg/dL Final    BUN/Creatinine Ratio 04/19/2024 19.5  7.0 - 25.0 Final    Anion Gap 04/19/2024 9.8  5.0 - 15.0 mmol/L Final    eGFR 04/19/2024 109.7  >60.0 mL/min/1.73 Final    WBC 04/19/2024 8.26  3.40 - 10.80 10*3/mm3 Final    RBC 04/19/2024 4.14  4.14 - 5.80 10*6/mm3 Final    Hemoglobin 04/19/2024 12.2  (L)  13.0 - 17.7 g/dL Final    Hematocrit 2024 37.4 (L)  37.5 - 51.0 % Final    MCV 2024 90.3  79.0 - 97.0 fL Final    MCH 2024 29.5  26.6 - 33.0 pg Final    MCHC 2024 32.6  31.5 - 35.7 g/dL Final    RDW 2024 14.2  12.3 - 15.4 % Final    RDW-SD 2024 47.2  37.0 - 54.0 fl Final    MPV 2024 9.5  6.0 - 12.0 fL Final    Platelets 2024 221  140 - 450 10*3/mm3 Final    Neutrophil % 2024 61.7  42.7 - 76.0 % Final    Lymphocyte % 2024 29.4  19.6 - 45.3 % Final    Monocyte % 2024 6.1  5.0 - 12.0 % Final    Eosinophil % 2024 2.1  0.3 - 6.2 % Final    Basophil % 2024 0.5  0.0 - 1.5 % Final    Immature Grans % 2024 0.2  0.0 - 0.5 % Final    Neutrophils, Absolute 2024 5.10  1.70 - 7.00 10*3/mm3 Final    Lymphocytes, Absolute 2024 2.43  0.70 - 3.10 10*3/mm3 Final    Monocytes, Absolute 2024 0.50  0.10 - 0.90 10*3/mm3 Final    Eosinophils, Absolute 2024 0.17  0.00 - 0.40 10*3/mm3 Final    Basophils, Absolute 2024 0.04  0.00 - 0.20 10*3/mm3 Final    Immature Grans, Absolute 2024 0.02  0.00 - 0.05 10*3/mm3 Final    nRBC 2024 0.0  0.0 - 0.2 /100 WBC Final    Hemoglobin 2024 11.6 (L)  13.0 - 17.7 g/dL Final    Reference Lab Report 2024    Final                    Value:[facility]  [address] [place] [address]  Phone: [phone] or [phone]  Fax: [phone]  Addi Cox M.D., Medical Director    PATIENT NAME                                     LABORATORY NO.  [id number]   TAYLOR OHARA                             [id number]  [id number]                                 AGE                    SEX   N              CLIENT REF #  [facility]                    48        1975           [id number]      [id number]    [address] [place] [address] BY-PASS                        REQUESTING M.D.           ATTENDING [facility].         COPY TO.   [address]          "[place] [id number]                         MALIKA MATOS  DATE COLLECTED            DATE RECEIVED          DATE REPORTED  04/19/2024 04/22/2024 04/23/2024    DIAGNOSIS:  ANTRUM AND BODY, BIOPSY:  Minimal to focally mild chronic inactive gastritis                           and reactive epithelial changes  No Helicobacter microorganisms identified on H&E stain  No identified intestine metaplasia, dysplasia or malignancy    CLINICAL HISTORY:  PUD (peptic ulcer disease), hematochezia, upper abdominal pain, nausea    SPECIMENS RECEIVED:  ANTRUM AND BODY, BIOPSY    MICROSCOPIC DESCRIPTION:  Tissue blocks are prepared and slides are examined microscopically on all  specimens. See diagnosis for details.    Professional interpretation rendered by Katy Lucas M.D. at [facility], [address] [place] [address].    GROSS DESCRIPTION:  Received in formalin labeled \"gastric antrum and body for H. pylori\" are  multiple tan soft tissue fragments aggregating 0.8 x 0.8 x 0.2 cm wrapped and  submitted entirely in block A1.  HDM    REVIEWED, DIAGNOSED AND ELECTRONICALLY  SIGNED BY:    Katy Lucas M.D.  CPT CODES:  72081      WBC 04/20/2024 5.73  3.40 - 10.80 10*3/mm3 Final    RBC 04/20/2024 3.73 (L)  4.14 - 5.80 10*6/mm3 Final    Hemoglobin 04/20/2024 10.9 (L)  13.0 - 17.7 g/dL Final    Hematocrit 04/20/2024 33.7 (L)  37.5 - 51.0 % Final    MCV 04/20/2024 90.3  79.0 - 97.0 fL Final    MCH 04/20/2024 29.2  26.6 - 33.0 pg Final    MCHC 04/20/2024 32.3  31.5 - 35.7 g/dL Final    RDW 04/20/2024 13.9  12.3 - 15.4 % Final    RDW-SD 04/20/2024 46.4  37.0 - 54.0 fl Final    MPV 04/20/2024 9.5  6.0 - 12.0 fL Final    Platelets 04/20/2024 175  140 - 450 10*3/mm3 Final    Glucose 04/20/2024 85  65 - 99 mg/dL Final    BUN 04/20/2024 14  6 - 20 mg/dL Final    Creatinine 04/20/2024 0.72 (L)  0.76 - 1.27 mg/dL Final    " Sodium 04/20/2024 137  136 - 145 mmol/L Final    Potassium 04/20/2024 4.1  3.5 - 5.2 mmol/L Final    Chloride 04/20/2024 105  98 - 107 mmol/L Final    CO2 04/20/2024 24.3  22.0 - 29.0 mmol/L Final    Calcium 04/20/2024 8.5 (L)  8.6 - 10.5 mg/dL Final    BUN/Creatinine Ratio 04/20/2024 19.4  7.0 - 25.0 Final    Anion Gap 04/20/2024 7.7  5.0 - 15.0 mmol/L Final    eGFR 04/20/2024 112.0  >60.0 mL/min/1.73 Final      No radiology results for the last 90 days.    CT Abdomen Pelvis Without Contrast [[id number]] Collected: 04/18/24 0756        Updated: 04/18/24 0802     Narrative:       PROCEDURE: CT ABDOMEN PELVIS WO CONTRAST-     HISTORY: GI bleed, concern for perf; R10.9-Unspecified abdominal pain;  G89.29-Other chronic pain; K27.9-Peptic ulcer, site unspecified,  unspecified as acute or chronic, without hemorrhage or perforation     COMPARISON: 11/28/2023     Note: Noncontrast exam is less sensitive for assessment of the bowel and  solid abdominal organs     TECHNIQUE: Noncontrast exam      CT ABDOMEN: Nonobstructing right renal stones are seen within the  calyces largest measuring up to 6 mm similar to prior exam. Otherwise  solid organs are grossly unremarkable. The bowel shows no evidence of  obstruction. There is no free air or free fluid within the abdomen.  Gallbladder is normal. Mild fecal impaction of the colon is present. No  gross bowel wall thickening is appreciated.     CT PELVIS: No bowel dilatation is seen. There is no free fluid. Appendix  is nonvisualized. No secondary findings of appendicitis are seen.  Prostate is minimally enlarged..        Impression:       1. No obvious inflammatory changes or bowel wall thickening.  2. No evidence of bowel obstruction  3. Nonobstructing right renal stones     This study was performed with techniques to keep radiation doses as low  as reasonably achievable (ALARA). Individualized dose reduction  techniques using automated exposure control or adjustment of vA  and/or  kV according to the patient size were employed.      This report was signed and finalized on 4/18/2024 8:00 AM by Aly Tong MD.                EGD dated 11/5/2021 per Dr. Graff  - Normal oropharynx.  - Z-line irregular, 40 cm from the incisors.  - LA Grade A reflux esophagitis with no bleeding.  - Esophageal small cyst was found.  - Erosive gastropathy with stigmata of recent bleeding. Biopsied.  - Atrophic, discolored and texture changed mucosa in the antrum. Biopsied.  - Multiple healed ulcer scar in the gastric body, in the incisura and in the gastric antrum.  - Normal duodenal bulb, first portion of the duodenum, second portion of the duodenum and third portion of the duodenum.  - Anemia improved, no current bleeding  -Gastric biopsy with mild foveolar hyperplasia and PPI effect.  No H. pylori.  Gastric antrum biopsy of abnormal mucosa with reactive gastropathy, no H. pylori.     EGD dated 1/19/2023 per Dr. Graff  - Normal oropharynx.  - Z-line irregular, 40 cm from the incisors.  - [place]-colored mucosa suspicious for short-segment Mg's esophagus. Biopsied.  - Erosive gastropathy with stigmata of recent bleeding. Biopsied.  - Non-bleeding gastric ulcers with no stigmata of bleeding.  - Normal duodenal bulb, first portion of the duodenum, second portion of the duodenum and third portion of the duodenum.  - Findings suggestive of NSAID induced gastropathy, PUD  A.   ANTRUM AND BODY, BIOPSY:   Reactive gastropathy.   Negative for intestinal metaplasia or dysplasia.   No H. pylori-like organisms identified on H&E.   B.   GE JUNCTION:   Reactive gastric mucosa; negative for intestinal metaplasia, dysplasia and   malignancy.   No squamous esophageal mucosa is identified.     EGD 1/19/2024  - Normal oropharynx.   - Z-line irregular, 40 cm from the incisors.   - [place]-colored mucosa suspicious for short-segment Mg's esophagus. Biopsied.   - Erosive gastropathy with stigmata of recent  bleeding. Biopsied.   - Non-bleeding gastric ulcers with no stigmata of bleeding.   - Normal duodenal bulb, first portion of the duodenum, second portion of the duodenum and third portion of the duodenum.   - Findings sugestive of NSAID induced gastropathy, PUD     DIAGNOSIS:  A. ANTRUM AND BODY, BIOPSY:  Reactive gastropathy.  Negative for intestinal metaplasia or dysplasia.  No H. pylori-like organisms identified on H&E.  B. GE JUNCTION:  Reactive gastric mucosa; negative for intestinal metaplasia, dysplasia and malignancy.  No squamous esophageal mucosa is identified.    EGD on 4/19/2024  - Normal oropharynx.   - Z-line variable, 40 cm from the incisors.   - 2 cm hiatal hernia.   - Erosive gastropathy with no stigmata of recent bleeding. Biopsied.   - Large Non-bleeding gastric ulcer at pyloric canal with a clean ulcer base ([place] Class III). - Normal duodenal bulb, first portion of the duodenum, second portion of the duodenum and th  - Normal duodenal bulb, first portion of the duodenum, second portion of the duodenum and third portion of the duodenum.   - High risk for peptic perforation   - No current signs of GI bleeding    DIAGNOSIS:  ANTRUM AND BODY, BIOPSY:  Minimal to focally mild chronic inactive gastritis and reactive epithelial changes  No Helicobacter microorganisms identified on H&E stain  No identified intestine metaplasia, dysplasia or malignancy    Assessment / Plan      1.  Chronic peptic ulcer disease with GI bleeding   2.  Chronic iron deficiency anemia  3.  Acute blood loss anemia  4.  History of erosive esophagitis  5.  Chronic functional nausea complicated by opioid use   6.  Long-term opioid use and NSAID use  7.  Opioid-induced constipation  Patient denies any further episodes of GI bleeding.  However he still continued to have upper abdominal pain.  Some suspicion of persistent ulcer.  He continues to smoke.  He is still on Suboxone with significant constipation. Last lab work before  discharge on 04/20/2024 revealed hemoglobin of 10.9.  No further lab work available to review.  His BMP was unremarkable.  He states that he stopped taking Excedrin that he was taking excessively in the past. . CT abdomen pelvis with contrast on 11/28/2023 showed finding concerning for gastritis.  Nodular focus of soft tissue enhancement adjacent to the distal stomach representing possible enlarged lymph node.  He states that he has significant evening reflux symptoms.     Will continue Protonix 40 mg p.o. daily in a.m. and Pepcid 40 mg nightly due to significant night symptoms  Continue to work on cutting down on smoking  We will start him on senna 2 tablets p.o. nightly  Controlled reduction on opioids will help  Iron pills p.o. daily  We will schedule EGD for surveillance of his pyloric canal ulcer and he needs a biopsy at this time    8. Personal history of colonic polyps  As per patient he may have had a colonoscopy done about 3-4 years ago at [place] details unknown.  He thinks he had a polyps removed.  He was scheduled for colonoscopy in May 2024 and he did not keep up the appointment.  He now states that he has a Cologuard kit at home and he was to do that first.    Cologuard test as planned  Will schedule colonoscopy if it is positive      Follow Up:   No follow-ups on file.    Leonor Graff MD  [facility] [place]  11/7/2024  16:06 EST     Please note that portions of this note may have been completed with a voice recognition program.

## 2024-11-07 NOTE — PROGRESS NOTES
Follow Up Note     Date: 2024   Patient Name: Americo Davis Jr.  MRN: [id number]  : 1975     Referring Physician: Tigist Wheatley APRN    Chief Complaint:    Chief Complaint   Patient presents with    Constipation    Peptic Ulcer Disease    Anemia       Interval History:   2024  Americo Davis Jr. is a 49 y.o. male who is here today for follow up for his peptic ulcer disease and after his recent EGD.  Patient states that he still has a significant upper abdominal burning reflux symptoms and nausea.  He had to take occasional evening dose of PPI.  He developed constipation again has he did not feel as senna.  He denies any black stool or red blood in the stool.  He continues to smoke and is also on Suboxone.    24  This is a 48-year-old male patient with gastroesophageal reflux disease.  Esophagitis, prior history of peptic ulcer disease, chronic deficiency anemia, long-term NSAID use, COPD, hypertension, hyperlipidemia, coronary artery disease, anxiety depression, was brought in emergency room this morning with a complaints of worsening abdominal pain and an episode of bright red rectal bleeding this morning     Patient just had a morphine for his abdominal pain and is very lethargic and drowsy unable to get any information from him.  His mom is at bedside and of history obtained.  She brought him to the emergency room this morning with worsening abdominal pain and episode of bright red rectal bleed this morning and associated with nausea vomiting without any blood in the vomitus..  He does have a ongoing nausea vomiting which is chronic.  Does have a chronic abdominal pain mostly mid and upper abdomen but has worsened in the last couple of weeks.  He also had bowel movement with small amount of red blood this morning.  There was no black stools.     He had a multiple EGDs done in the last 2 to 3 years time.  Patient has been taking ibuprofen, Aleve, Excedrin despite  recommended to stop.  He continues to smoke.  He continues to drink excessive pops without changing his diet.  Seen in the office 2 weeks ago for the similar symptoms.  His prior EGDs revealed erosive esophagitis and also recurrent peptic ulcer disease.  Last EGD wasIn January 2024 that again revealed erosive gastropathy with a healed ulcer scars.  Patient's last colonoscopy was in 2020 and had polyps removed.  Present note.    4/11/2024  Americo Davis Jr. is a 48 y.o. male who is here today for follow up for his ongoing abdominal pain nausea vomiting.  He states that he continues to have a worsening abdominal pain mostly in the upper abdomen and the left side abdomen with nausea and vomiting.  He could not stop his NSAIDs he is still taking ibuprofen Aleve and Excedrin as needed for his migraine headache.  He continues to smoke.  Continues to drink excessive pops.  He did not change any of his diet.  States that he had to come to emergency room in December and Protonix given is not working for him.     5/20/2021  He had an EGD at [facility] in [place] in Jan 2021 by Dr. Ricks and was told he had a bleeding ulcer (not sure of exact location). He was given a blood transfusion at that time and was placed on protonix 40 mg BID but insurance did not cover protonix. He has been taking prilosec 20 mg once daily otc. He has not been having heartburn unless he eats spicy foods. He denies any current abdominal pain. No melena. No rectal bleeding. Normal bowel movements brown in color, occurring every 2-3 days. Reports severe fatigue and relates this to his lung disease, still smoking and uses oxygen at night. He had labs last a couple of weeks ago, not sure of those results. Not currently taking any iron supplement.      Had colonoscopy last year in 2020, had 1 colon polyp removed. He was told to have another colonoscopy in 5 years. He cannot recall the provider's name who completed this. There is no known family history of  "colon cancer or colon polyps.       Subjective      Past Medical History:   Past Medical History:   Diagnosis Date    Anemia     Anxiety     Asthma     Back pain     Bleeding ulcer 04/2024    Body piercing 10/06/2021    ears    CHF (congestive heart failure)     Reported by patient's wife    Chronic venous insufficiency     Colitis     Constipation     COPD (chronic obstructive pulmonary disease)     Call or callus     Coronary artery disease     Depression     GERD (gastroesophageal reflux disease)     GI bleed     secondary to gastric ulcer - hospitalized 4/18-4/20/24    Gout     Headache     History of ASCVD (atherosclerotic cardiovascular disease)     History of blood transfusion     Spouse reported no reaction to transfuson     History of fracture 10/06/2021    Hx right pinky fracture - did require surgery    History of heart attack     Spouse reported \"2 light heart attacks at age 30\"     History of stomach ulcers     Hyperlipidemia     Hypertension     Migraine headache     Mild aortic valve sclerosis     Mild mitral insufficiency     Spouse reported MVP and that Dr Preciado said not sever enough to warrant surgery    On home oxygen therapy     Spouse reported at 2L/min NC prn    Peptic ulcer disease     Poor historian 10/06/2021    Snores     Tattoos     Vision problems     Weight loss     Recent Weight Loss Involuntary For Over A Year Or More     Past Surgical History:   Past Surgical History:   Procedure Laterality Date    APPENDECTOMY      COLONOSCOPY      ENDOSCOPY      ENDOSCOPY N/A 11/5/2021    Procedure: ESOPHAGOGASTRODUODENOSCOPY WITH BIOPSY;  Surgeon: Leonor Graff MD;  Location: [facility] ENDOSCOPY;  Service: Gastroenterology;  Laterality: N/A;    ENDOSCOPY N/A 1/19/2023    Procedure: ESOPHAGOGASTRODUODENOSCOPY WITH BIOPSIES;  Surgeon: Leonor Graff MD;  Location: [facility] ENDOSCOPY;  Service: Gastroenterology;  Laterality: N/A;    ENDOSCOPY N/A 4/19/2024    Procedure: " ESOPHAGOGASTRODUODENOSCOPY with biopsy;  Surgeon: Leonor Graff MD;  Location: [facility] ENDOSCOPY;  Service: Gastroenterology;  Laterality: N/A;    FRACTURE SURGERY Right     Pinky finger    WISDOM TOOTH EXTRACTION         Family History:   Family History   Problem Relation Age of Onset    Arthritis Mother     Thyroid disease Mother     Hypertension Mother     Migraines Sister     Thyroid disease Sister     Hypertension Sister     Mental illness Brother     Cancer Paternal Uncle     Arthritis Maternal Grandmother     Stroke Maternal Grandmother     Colon cancer Neg Hx     Cirrhosis Neg Hx     Liver cancer Neg Hx     Liver disease Neg Hx        Social History:   Social History     Socioeconomic History    Marital status:    Tobacco Use    Smoking status: Every Day     Current packs/day: 0.50     Average packs/day: 0.5 packs/day for 33.8 years (16.9 ttl pk-yrs)     Types: Cigarettes     Start date: 1991     Passive exposure: Current    Smokeless tobacco: Current     Types: Snuff   Vaping Use    Vaping status: Some Days    Substances: Nicotine (Spouse reported no use in 2 years), Flavoring    Devices: Disposable   Substance and Sexual Activity    Alcohol use: Not Currently     Comment: social    Drug use: Not Currently     Comment: opiates, snort    Sexual activity: Defer       Medications:     Current Outpatient Medications:     buprenorphine-naloxone (SUBOXONE) 8-2 MG per SL tablet, Place 2 tablets under the tongue Daily., Disp: , Rfl:     cyclobenzaprine (FLEXERIL) 10 MG tablet, Take 1 tablet by mouth 3 times a day., Disp: , Rfl:     hydrOXYzine (ATARAX) 25 MG tablet, Take 1 tablet by mouth every night at bedtime., Disp: , Rfl:     losartan (COZAAR) 50 MG tablet, , Disp: , Rfl:     metoprolol succinate XL (TOPROL-XL) 25 MG 24 hr tablet, Take 1 tablet by mouth Daily., Disp: , Rfl:     naloxone (NARCAN) 4 MG/0.1ML nasal spray, 1 spray into the nostril(s) as directed by provider As Needed (.). use for  "oversedation and call 911, Disp: 1 each, Rfl: 2    pantoprazole (PROTONIX) 40 MG EC tablet, Take 1 tablet by mouth Daily. Taking 2 per day, Disp: , Rfl:     senna 8.6 MG tablet, Take 2 tablets by mouth Every Night., Disp: 60 tablet, Rfl: 5    hydroCHLOROthiazide 12.5 MG tablet, Take 1 tablet by mouth Daily. (Patient not taking: Reported on 11/7/2024), Disp: , Rfl:     nicotine (NICODERM CQ) 21 MG/24HR patch, Place 1 patch on the skin as directed by provider Daily. (Patient not taking: Reported on 11/7/2024), Disp: 14 each, Rfl: 0    ondansetron (Zofran) 4 MG tablet, Take 1 tablet by mouth Every 8 (Eight) Hours As Needed for Nausea. (Patient not taking: Reported on 11/7/2024), Disp: 30 tablet, Rfl: 1    Allergies:   Allergies   Allergen Reactions    Sulfa Antibiotics Other (See Comments)     \"causes his insides to burn and his skin gets really red\" reported by patient's spouse        Review of Systems:   Review of Systems   Constitutional:  Positive for appetite change, fatigue and unexpected weight loss. Negative for fever.   HENT:  Negative for trouble swallowing.    Gastrointestinal:  Positive for abdominal pain, constipation, nausea, vomiting (Isolated 3-4 day episode), GERD and indigestion. Negative for abdominal distention, anal bleeding, blood in stool, diarrhea and rectal pain.       The following portions of the patient's history were reviewed and updated as appropriate: allergies, current medications, past family history, past medical history, past social history, past surgical history and problem list.    Objective     Physical Exam:  Vital Signs:   Vitals:    11/07/24 1603   BP: 109/77   Pulse: 94   Temp: 98.4 °F (36.9 °C)   TempSrc: Infrared   Weight: 68.9 kg (152 lb)  Comment: with clothing/shoes   Height: 177.8 cm (70\")  Comment: per EPIC       Physical Exam  Constitutional:       Appearance: Normal appearance.   HENT:      Head: Normocephalic and atraumatic.   Eyes:      Conjunctiva/sclera: " Conjunctivae normal.   Abdominal:      General: Abdomen is flat. There is no distension.      Palpations: There is no mass.      Tenderness: There is no abdominal tenderness (Diffuse upper abdominal discomfort on palpation). There is no guarding or rebound.      Hernia: No hernia is present.   Musculoskeletal:      Cervical back: Normal range of motion and neck supple.   Neurological:      Mental Status: He is alert.         Results Review:   I reviewed the patient's new clinical results.    No visits with results within 90 Day(s) from this visit.   Latest known visit with results is:   Admission on 04/18/2024, Discharged on 04/20/2024   Component Date Value Ref Range Status    Glucose 04/18/2024 126 (H)  65 - 99 mg/dL Final    BUN 04/18/2024 26 (H)  6 - 20 mg/dL Final    Creatinine 04/18/2024 0.88  0.76 - 1.27 mg/dL Final    Sodium 04/18/2024 140  136 - 145 mmol/L Final    Potassium 04/18/2024 4.5  3.5 - 5.2 mmol/L Final    Chloride 04/18/2024 103  98 - 107 mmol/L Final    CO2 04/18/2024 21.9 (L)  22.0 - 29.0 mmol/L Final    Calcium 04/18/2024 10.0  8.6 - 10.5 mg/dL Final    Total Protein 04/18/2024 7.7  6.0 - 8.5 g/dL Final    Albumin 04/18/2024 4.2  3.5 - 5.2 g/dL Final    ALT (SGPT) 04/18/2024 9  1 - 41 U/L Final    AST (SGOT) 04/18/2024 12  1 - 40 U/L Final    Alkaline Phosphatase 04/18/2024 77  39 - 117 U/L Final    Total Bilirubin 04/18/2024 <0.2  0.0 - 1.2 mg/dL Final    Globulin 04/18/2024 3.5  gm/dL Final    A/G Ratio 04/18/2024 1.2  g/dL Final    BUN/Creatinine Ratio 04/18/2024 29.5 (H)  7.0 - 25.0 Final    Anion Gap 04/18/2024 15.1 (H)  5.0 - 15.0 mmol/L Final    eGFR 04/18/2024 105.4  >60.0 mL/min/1.73 Final    Lipase 04/18/2024 82 (H)  13 - 60 U/L Final    Color, UA 04/18/2024 Yellow  Yellow, Straw Final    Appearance, UA 04/18/2024 Clear  Clear Final    pH, UA 04/18/2024 6.5  5.0 - 8.0 Final    Specific Gravity, UA 04/18/2024 1.019  1.005 - 1.030 Final    Glucose, UA 04/18/2024 Negative  Negative  Final    Ketones, UA 04/18/2024 Negative  Negative Final    Bilirubin, UA 04/18/2024 Negative  Negative Final    Blood, UA 04/18/2024 Negative  Negative Final    Protein, UA 04/18/2024 Negative  Negative Final    Leuk Esterase, UA 04/18/2024 Negative  Negative Final    Nitrite, UA 04/18/2024 Negative  Negative Final    Urobilinogen, UA 04/18/2024 0.2 E.U./dL  0.2 - 1.0 E.U./dL Final    Extra Tube 04/18/2024 Hold for add-ons.   Final    Auto resulted.    Extra Tube 04/18/2024 hold for add-on   Final    Auto resulted    Extra Tube 04/18/2024 Hold for add-ons.   Final    Auto resulted.    Extra Tube 04/18/2024 Hold for add-ons.   Final    Auto resulted    WBC 04/18/2024 8.65  3.40 - 10.80 10*3/mm3 Final    RBC 04/18/2024 4.91  4.14 - 5.80 10*6/mm3 Final    Hemoglobin 04/18/2024 14.6  13.0 - 17.7 g/dL Final    Hematocrit 04/18/2024 43.7  37.5 - 51.0 % Final    MCV 04/18/2024 89.0  79.0 - 97.0 fL Final    MCH 04/18/2024 29.7  26.6 - 33.0 pg Final    MCHC 04/18/2024 33.4  31.5 - 35.7 g/dL Final    RDW 04/18/2024 14.0  12.3 - 15.4 % Final    RDW-SD 04/18/2024 45.3  37.0 - 54.0 fl Final    MPV 04/18/2024 9.3  6.0 - 12.0 fL Final    Platelets 04/18/2024 249  140 - 450 10*3/mm3 Final    Neutrophil % 04/18/2024 55.7  42.7 - 76.0 % Final    Lymphocyte % 04/18/2024 34.6  19.6 - 45.3 % Final    Monocyte % 04/18/2024 6.4  5.0 - 12.0 % Final    Eosinophil % 04/18/2024 2.8  0.3 - 6.2 % Final    Basophil % 04/18/2024 0.3  0.0 - 1.5 % Final    Immature Grans % 04/18/2024 0.2  0.0 - 0.5 % Final    Neutrophils, Absolute 04/18/2024 4.82  1.70 - 7.00 10*3/mm3 Final    Lymphocytes, Absolute 04/18/2024 2.99  0.70 - 3.10 10*3/mm3 Final    Monocytes, Absolute 04/18/2024 0.55  0.10 - 0.90 10*3/mm3 Final    Eosinophils, Absolute 04/18/2024 0.24  0.00 - 0.40 10*3/mm3 Final    Basophils, Absolute 04/18/2024 0.03  0.00 - 0.20 10*3/mm3 Final    Immature Grans, Absolute 04/18/2024 0.02  0.00 - 0.05 10*3/mm3 Final    nRBC 04/18/2024 0.0  0.0 - 0.2  /100 WBC Final    THC, Screen, Urine 04/18/2024 Negative  Negative Final    Phencyclidine (PCP), Urine 04/18/2024 Negative  Negative Final    Cocaine Screen, Urine 04/18/2024 Negative  Negative Final    Methamphetamine, Ur 04/18/2024 Negative  Negative Final    Opiate Screen 04/18/2024 Positive (A)  Negative Final    Amphetamine Screen, Urine 04/18/2024 Negative  Negative Final    Benzodiazepine Screen, Urine 04/18/2024 Negative  Negative Final    Tricyclic Antidepressants Screen 04/18/2024 Negative  Negative Final    Methadone Screen, Urine 04/18/2024 Negative  Negative Final    Barbiturates Screen, Urine 04/18/2024 Negative  Negative Final    Oxycodone Screen, Urine 04/18/2024 Negative  Negative Final    Buprenorphine, Screen, Urine 04/18/2024 Negative  Negative Final    Fentanyl, Urine 04/18/2024 Negative  Negative Final    Hemoglobin 04/18/2024 13.8  13.0 - 17.7 g/dL Final    Hemoglobin 04/18/2024 12.6 (L)  13.0 - 17.7 g/dL Final    ABO Type 04/18/2024 O   Final    RH type 04/18/2024 Positive   Final    Antibody Screen 04/18/2024 Negative   Final    T&S Expiration Date 04/18/2024 4/21/2024 11:59:59 PM   Final    ABO Type 04/18/2024 O   Final    RH type 04/18/2024 Positive   Final    Hemoglobin 04/19/2024 12.2 (L)  13.0 - 17.7 g/dL Final    Glucose 04/19/2024 88  65 - 99 mg/dL Final    BUN 04/19/2024 15  6 - 20 mg/dL Final    Creatinine 04/19/2024 0.77  0.76 - 1.27 mg/dL Final    Sodium 04/19/2024 136  136 - 145 mmol/L Final    Potassium 04/19/2024 3.8  3.5 - 5.2 mmol/L Final    Chloride 04/19/2024 103  98 - 107 mmol/L Final    CO2 04/19/2024 23.2  22.0 - 29.0 mmol/L Final    Calcium 04/19/2024 8.8  8.6 - 10.5 mg/dL Final    BUN/Creatinine Ratio 04/19/2024 19.5  7.0 - 25.0 Final    Anion Gap 04/19/2024 9.8  5.0 - 15.0 mmol/L Final    eGFR 04/19/2024 109.7  >60.0 mL/min/1.73 Final    WBC 04/19/2024 8.26  3.40 - 10.80 10*3/mm3 Final    RBC 04/19/2024 4.14  4.14 - 5.80 10*6/mm3 Final    Hemoglobin 04/19/2024 12.2  (L)  13.0 - 17.7 g/dL Final    Hematocrit 2024 37.4 (L)  37.5 - 51.0 % Final    MCV 2024 90.3  79.0 - 97.0 fL Final    MCH 2024 29.5  26.6 - 33.0 pg Final    MCHC 2024 32.6  31.5 - 35.7 g/dL Final    RDW 2024 14.2  12.3 - 15.4 % Final    RDW-SD 2024 47.2  37.0 - 54.0 fl Final    MPV 2024 9.5  6.0 - 12.0 fL Final    Platelets 2024 221  140 - 450 10*3/mm3 Final    Neutrophil % 2024 61.7  42.7 - 76.0 % Final    Lymphocyte % 2024 29.4  19.6 - 45.3 % Final    Monocyte % 2024 6.1  5.0 - 12.0 % Final    Eosinophil % 2024 2.1  0.3 - 6.2 % Final    Basophil % 2024 0.5  0.0 - 1.5 % Final    Immature Grans % 2024 0.2  0.0 - 0.5 % Final    Neutrophils, Absolute 2024 5.10  1.70 - 7.00 10*3/mm3 Final    Lymphocytes, Absolute 2024 2.43  0.70 - 3.10 10*3/mm3 Final    Monocytes, Absolute 2024 0.50  0.10 - 0.90 10*3/mm3 Final    Eosinophils, Absolute 2024 0.17  0.00 - 0.40 10*3/mm3 Final    Basophils, Absolute 2024 0.04  0.00 - 0.20 10*3/mm3 Final    Immature Grans, Absolute 2024 0.02  0.00 - 0.05 10*3/mm3 Final    nRBC 2024 0.0  0.0 - 0.2 /100 WBC Final    Hemoglobin 2024 11.6 (L)  13.0 - 17.7 g/dL Final    Reference Lab Report 2024    Final                    Value:[facility]  [address] [place] [address]  Phone: [phone] or [phone]  Fax: [phone]  Addi Cox M.D., Medical Director    PATIENT NAME                                     LABORATORY NO.  [id number]   TAYLOR OHARA                             [id number]  [id number]                                 AGE                    SEX   N              CLIENT REF #  [facility]                    48        1975           [id number]      [id number]    [address] [place] [address] BY-PASS                        REQUESTING M.D.           ATTENDING [facility].         COPY TO.   [address]          "[place] [id number]                         MALIKA MATOS  DATE COLLECTED            DATE RECEIVED          DATE REPORTED  04/19/2024 04/22/2024 04/23/2024    DIAGNOSIS:  ANTRUM AND BODY, BIOPSY:  Minimal to focally mild chronic inactive gastritis                           and reactive epithelial changes  No Helicobacter microorganisms identified on H&E stain  No identified intestine metaplasia, dysplasia or malignancy    CLINICAL HISTORY:  PUD (peptic ulcer disease), hematochezia, upper abdominal pain, nausea    SPECIMENS RECEIVED:  ANTRUM AND BODY, BIOPSY    MICROSCOPIC DESCRIPTION:  Tissue blocks are prepared and slides are examined microscopically on all  specimens. See diagnosis for details.    Professional interpretation rendered by Katy Lucas M.D. at [facility], [address] [place] [address].    GROSS DESCRIPTION:  Received in formalin labeled \"gastric antrum and body for H. pylori\" are  multiple tan soft tissue fragments aggregating 0.8 x 0.8 x 0.2 cm wrapped and  submitted entirely in block A1.  HDM    REVIEWED, DIAGNOSED AND ELECTRONICALLY  SIGNED BY:    Katy Lucas M.D.  CPT CODES:  95724      WBC 04/20/2024 5.73  3.40 - 10.80 10*3/mm3 Final    RBC 04/20/2024 3.73 (L)  4.14 - 5.80 10*6/mm3 Final    Hemoglobin 04/20/2024 10.9 (L)  13.0 - 17.7 g/dL Final    Hematocrit 04/20/2024 33.7 (L)  37.5 - 51.0 % Final    MCV 04/20/2024 90.3  79.0 - 97.0 fL Final    MCH 04/20/2024 29.2  26.6 - 33.0 pg Final    MCHC 04/20/2024 32.3  31.5 - 35.7 g/dL Final    RDW 04/20/2024 13.9  12.3 - 15.4 % Final    RDW-SD 04/20/2024 46.4  37.0 - 54.0 fl Final    MPV 04/20/2024 9.5  6.0 - 12.0 fL Final    Platelets 04/20/2024 175  140 - 450 10*3/mm3 Final    Glucose 04/20/2024 85  65 - 99 mg/dL Final    BUN 04/20/2024 14  6 - 20 mg/dL Final    Creatinine 04/20/2024 0.72 (L)  0.76 - 1.27 mg/dL Final    " Sodium 04/20/2024 137  136 - 145 mmol/L Final    Potassium 04/20/2024 4.1  3.5 - 5.2 mmol/L Final    Chloride 04/20/2024 105  98 - 107 mmol/L Final    CO2 04/20/2024 24.3  22.0 - 29.0 mmol/L Final    Calcium 04/20/2024 8.5 (L)  8.6 - 10.5 mg/dL Final    BUN/Creatinine Ratio 04/20/2024 19.4  7.0 - 25.0 Final    Anion Gap 04/20/2024 7.7  5.0 - 15.0 mmol/L Final    eGFR 04/20/2024 112.0  >60.0 mL/min/1.73 Final      No radiology results for the last 90 days.    CT Abdomen Pelvis Without Contrast [[id number]] Collected: 04/18/24 0756        Updated: 04/18/24 0802     Narrative:       PROCEDURE: CT ABDOMEN PELVIS WO CONTRAST-     HISTORY: GI bleed, concern for perf; R10.9-Unspecified abdominal pain;  G89.29-Other chronic pain; K27.9-Peptic ulcer, site unspecified,  unspecified as acute or chronic, without hemorrhage or perforation     COMPARISON: 11/28/2023     Note: Noncontrast exam is less sensitive for assessment of the bowel and  solid abdominal organs     TECHNIQUE: Noncontrast exam      CT ABDOMEN: Nonobstructing right renal stones are seen within the  calyces largest measuring up to 6 mm similar to prior exam. Otherwise  solid organs are grossly unremarkable. The bowel shows no evidence of  obstruction. There is no free air or free fluid within the abdomen.  Gallbladder is normal. Mild fecal impaction of the colon is present. No  gross bowel wall thickening is appreciated.     CT PELVIS: No bowel dilatation is seen. There is no free fluid. Appendix  is nonvisualized. No secondary findings of appendicitis are seen.  Prostate is minimally enlarged..        Impression:       1. No obvious inflammatory changes or bowel wall thickening.  2. No evidence of bowel obstruction  3. Nonobstructing right renal stones     This study was performed with techniques to keep radiation doses as low  as reasonably achievable (ALARA). Individualized dose reduction  techniques using automated exposure control or adjustment of vA  and/or  kV according to the patient size were employed.      This report was signed and finalized on 4/18/2024 8:00 AM by Aly Tong MD.                EGD dated 11/5/2021 per Dr. Graff  - Normal oropharynx.  - Z-line irregular, 40 cm from the incisors.  - LA Grade A reflux esophagitis with no bleeding.  - Esophageal small cyst was found.  - Erosive gastropathy with stigmata of recent bleeding. Biopsied.  - Atrophic, discolored and texture changed mucosa in the antrum. Biopsied.  - Multiple healed ulcer scar in the gastric body, in the incisura and in the gastric antrum.  - Normal duodenal bulb, first portion of the duodenum, second portion of the duodenum and third portion of the duodenum.  - Anemia improved, no current bleeding  -Gastric biopsy with mild foveolar hyperplasia and PPI effect.  No H. pylori.  Gastric antrum biopsy of abnormal mucosa with reactive gastropathy, no H. pylori.     EGD dated 1/19/2023 per Dr. Graff  - Normal oropharynx.  - Z-line irregular, 40 cm from the incisors.  - [place]-colored mucosa suspicious for short-segment Mg's esophagus. Biopsied.  - Erosive gastropathy with stigmata of recent bleeding. Biopsied.  - Non-bleeding gastric ulcers with no stigmata of bleeding.  - Normal duodenal bulb, first portion of the duodenum, second portion of the duodenum and third portion of the duodenum.  - Findings suggestive of NSAID induced gastropathy, PUD  A.   ANTRUM AND BODY, BIOPSY:   Reactive gastropathy.   Negative for intestinal metaplasia or dysplasia.   No H. pylori-like organisms identified on H&E.   B.   GE JUNCTION:   Reactive gastric mucosa; negative for intestinal metaplasia, dysplasia and   malignancy.   No squamous esophageal mucosa is identified.     EGD 1/19/2024  - Normal oropharynx.   - Z-line irregular, 40 cm from the incisors.   - [place]-colored mucosa suspicious for short-segment Mg's esophagus. Biopsied.   - Erosive gastropathy with stigmata of recent  bleeding. Biopsied.   - Non-bleeding gastric ulcers with no stigmata of bleeding.   - Normal duodenal bulb, first portion of the duodenum, second portion of the duodenum and third portion of the duodenum.   - Findings sugestive of NSAID induced gastropathy, PUD     DIAGNOSIS:  A. ANTRUM AND BODY, BIOPSY:  Reactive gastropathy.  Negative for intestinal metaplasia or dysplasia.  No H. pylori-like organisms identified on H&E.  B. GE JUNCTION:  Reactive gastric mucosa; negative for intestinal metaplasia, dysplasia and malignancy.  No squamous esophageal mucosa is identified.    EGD on 4/19/2024  - Normal oropharynx.   - Z-line variable, 40 cm from the incisors.   - 2 cm hiatal hernia.   - Erosive gastropathy with no stigmata of recent bleeding. Biopsied.   - Large Non-bleeding gastric ulcer at pyloric canal with a clean ulcer base ([place] Class III). - Normal duodenal bulb, first portion of the duodenum, second portion of the duodenum and th  - Normal duodenal bulb, first portion of the duodenum, second portion of the duodenum and third portion of the duodenum.   - High risk for peptic perforation   - No current signs of GI bleeding    DIAGNOSIS:  ANTRUM AND BODY, BIOPSY:  Minimal to focally mild chronic inactive gastritis and reactive epithelial changes  No Helicobacter microorganisms identified on H&E stain  No identified intestine metaplasia, dysplasia or malignancy    Assessment / Plan      1.  Chronic peptic ulcer disease with GI bleeding   2.  Chronic iron deficiency anemia  3.  Acute blood loss anemia  4.  History of erosive esophagitis  5.  Chronic functional nausea complicated by opioid use   6.  Long-term opioid use and NSAID use  7.  Opioid-induced constipation  Patient denies any further episodes of GI bleeding.  However he still continued to have upper abdominal pain.  Some suspicion of persistent ulcer.  He continues to smoke.  He is still on Suboxone with significant constipation. Last lab work before  discharge on 04/20/2024 revealed hemoglobin of 10.9.  No further lab work available to review.  His BMP was unremarkable.  He states that he stopped taking Excedrin that he was taking excessively in the past. . CT abdomen pelvis with contrast on 11/28/2023 showed finding concerning for gastritis.  Nodular focus of soft tissue enhancement adjacent to the distal stomach representing possible enlarged lymph node.  He states that he has significant evening reflux symptoms.     Will continue Protonix 40 mg p.o. daily in a.m. and Pepcid 40 mg nightly due to significant night symptoms  Continue to work on cutting down on smoking  We will start him on senna 2 tablets p.o. nightly  Controlled reduction on opioids will help  Iron pills p.o. daily  We will schedule EGD for surveillance of his pyloric canal ulcer and he needs a biopsy at this time    8. Personal history of colonic polyps  As per patient he may have had a colonoscopy done about 3-4 years ago at [place] details unknown.  He thinks he had a polyps removed.  He was scheduled for colonoscopy in May 2024 and he did not keep up the appointment.  He now states that he has a Cologuard kit at home and he was to do that first.    Cologuard test as planned  Will schedule colonoscopy if it is positive      Follow Up:   No follow-ups on file.    Leonor Graff MD  [facility]  11/7/2024  16:06 EST     Please note that portions of this note may have been completed with a voice recognition program.

## 2024-11-18 NOTE — PRE-PROCEDURE INSTRUCTIONS
PAT phone history completed with patient's mother for upcoming procedure on 11/20/24.    PAT PASS reviewed with patient and he/she verbalized understanding of the following:     Do not eat or drink anything after midnight the night before procedure unless otherwise instructed by physician/surgeon's office, this includes no gum, candy, mints, tobacco products or e-cigarettes.  Do not shave the area to be operated on at least 48 hours prior to procedure.  Do not wear makeup, lotion, hair products, or nail polish.  Do not wear any jewelry and remove all piercings.  Do not wear any adhesive if you wear dentures.  Do not wear contacts; bring in glasses if needed.  Only take medications on the morning of procedure as instructed by PAT nurse per anesthesia guidelines or as instructed by physician's office.   If you are on any blood thinners reach out to the physician/surgeon's office for instructions on when/if they will need to be stopped prior to procedure.   Bring in picture ID and insurance card, advanced directive copies if applicable, CPAP/BIPAP/Inhalers if indicated morning of procedure, leave any other valuables at home.  Ensure you have arranged for someone to drive you home the day of your procedure and someone to care for you at home afterwards. It is recommended that you do not drive, drink alcohol, or make any major legal decisions for at least 24 hours after your procedure is complete.  ERAS instructions given to patient unless otherwise instructed per surgeon's orders.     Instructions given on hospital entrance and registration location.

## 2024-11-20 ENCOUNTER — ANESTHESIA (OUTPATIENT)
Dept: GASTROENTEROLOGY | Facility: HOSPITAL | Age: 49
End: 2024-11-20
Payer: MEDICAID

## 2024-11-20 ENCOUNTER — ANESTHESIA EVENT (OUTPATIENT)
Dept: GASTROENTEROLOGY | Facility: HOSPITAL | Age: 49
End: 2024-11-20
Payer: MEDICAID

## 2024-11-20 ENCOUNTER — HOSPITAL ENCOUNTER (OUTPATIENT)
Facility: HOSPITAL | Age: 49
Setting detail: HOSPITAL OUTPATIENT SURGERY
Discharge: HOME | End: 2024-11-20
Attending: INTERNAL MEDICINE | Admitting: INTERNAL MEDICINE
Payer: MEDICAID

## 2024-11-20 VITALS
SYSTOLIC BLOOD PRESSURE: 116 MMHG | DIASTOLIC BLOOD PRESSURE: 84 MMHG | OXYGEN SATURATION: 96 % | RESPIRATION RATE: 16 BRPM | HEART RATE: 77 BPM | TEMPERATURE: 98.2 F

## 2024-11-20 DIAGNOSIS — K27.9 PUD (PEPTIC ULCER DISEASE): ICD-10-CM

## 2024-11-20 PROCEDURE — 25010000002 PROPOFOL 10 MG/ML EMULSION: Performed by: NURSE ANESTHETIST, CERTIFIED REGISTERED

## 2024-11-20 PROCEDURE — 43239 EGD BIOPSY SINGLE/MULTIPLE: CPT | Performed by: INTERNAL MEDICINE

## 2024-11-20 PROCEDURE — 25010000002 LIDOCAINE (CARDIAC): Performed by: NURSE ANESTHETIST, CERTIFIED REGISTERED

## 2024-11-20 RX ORDER — PROPOFOL 10 MG/ML
VIAL (ML) INTRAVENOUS CONTINUOUS PRN
Status: DISCONTINUED | OUTPATIENT
Start: 2024-11-20 | End: 2024-11-20 | Stop reason: SURG

## 2024-11-20 RX ADMIN — PROPOFOL 200 MCG/KG/MIN: 10 INJECTION, EMULSION INTRAVENOUS at 07:46

## 2024-11-20 RX ADMIN — LIDOCAINE HYDROCHLORIDE 60 MG: 20 INJECTION, SOLUTION INTRAVENOUS at 07:46

## 2024-11-20 NOTE — ANESTHESIA POSTPROCEDURE EVALUATION
Patient: Americo Davis Jr.    Procedure Summary       Date: 11/20/24 Room / Location: [facility] ENDOSCOPY 2 / [facility] ENDOSCOPY    Anesthesia Start: 0732 Anesthesia Stop: 0755    Procedure: Esophagogastroduodenscopy with biopsy (Esophagus) Diagnosis:       PUD (peptic ulcer disease)      (PUD (peptic ulcer disease) [K27.9])    Surgeons: Leonor Graff MD Provider: Eduardo Baltazar CRNA    Anesthesia Type: MAC ASA Status: 3            Anesthesia Type: MAC    Vitals  No vitals data found for the desired time range.          Post Anesthesia Care and Evaluation    Patient location during evaluation: PHASE II  Patient participation: complete - patient participated  Level of consciousness: awake and alert  Pain score: 0  Pain management: satisfactory to patient    Airway patency: patent  Anesthetic complications: No anesthetic complications  PONV Status: none  Cardiovascular status: acceptable and stable  Respiratory status: acceptable and spontaneous ventilation  Hydration status: acceptable    Comments: Vitals signs as noted in nursing documentation as per protocol.

## 2024-11-20 NOTE — DISCHARGE INSTRUCTIONS
No pushing, pulling, tugging,  heavy lifting, or strenuous activity.  No major decision making, driving, or drinking alcoholic beverages for 24 hours. ( due to the medications you have  received)  Always use good hand hygiene/washing techniques.  NO driving while taking pain medications.    * if you have an incision:  Check your incision area every day for signs of infection.   Check for:  * more redness, swelling, or pain  *more fluid or blood  *warmth  *pus or bad smell      - Discharge patient to home (ambulatory).   - Resume low fiber small meals   - Follow an antireflux regimen.   - PPI daily  - Complete abstinence form Goody powder use  - OK to take tylenol  - Abstinence from smoking  - Await pathology results.   - Repeat upper endoscopy in 6 months for surveillance.   - Return to GI office in 8 weeks.

## 2024-11-20 NOTE — ANESTHESIA PREPROCEDURE EVALUATION
Anesthesia Evaluation     Patient summary reviewed and Nursing notes reviewed   NPO Solid Status: > 8 hours  NPO Liquid Status: > 6 hours           Airway   Mallampati: II  TM distance: >3 FB  Neck ROM: full  Possible difficult intubation  Dental - normal exam     Pulmonary - normal exam   (+) a smoker Current, Smoked day of surgery, COPD moderate, asthma,  Cardiovascular - normal exam  Exercise tolerance: good (4-7 METS)    Patient on routine beta blocker    (+) hypertension well controlled 2 medications or greater, valvular problems/murmurs MVP, CAD, CHF , hyperlipidemia      Neuro/Psych  (+) headaches, psychiatric history Anxiety and Depression  GI/Hepatic/Renal/Endo    (+) GERD well controlled, PUD, GI bleeding upper     Musculoskeletal     (+) back pain  Abdominal  - normal exam   Substance History - negative use     OB/GYN negative ob/gyn ROS         Other - negative ROS                         Anesthesia Plan    ASA 3     MAC     (Pt told that intravenous sedation will be used as the primary anesthetic along with local anesthesia if necessary. Every effort will be made to make sure the patient is comfortable.     The patient was told they may or may not have recall for the procedure. It was further explained that if the MAC was not adequate that a general anesthetic with either an LMA or endotracheal tube would be required.     Will proceed with the plan of care.)  intravenous induction     Anesthetic plan, risks, benefits, and alternatives have been provided, discussed and informed consent has been obtained with: patient.    Plan discussed with CRNA.

## 2024-11-21 LAB — REF LAB TEST METHOD: NORMAL

## 2025-01-02 ENCOUNTER — TRANSCRIBE ORDERS (OUTPATIENT)
Dept: ADMINISTRATIVE | Facility: HOSPITAL | Age: 50
End: 2025-01-02
Payer: MEDICAID

## 2025-01-02 DIAGNOSIS — R10.10 UPPER ABDOMINAL PAIN: Primary | ICD-10-CM

## 2025-01-23 ENCOUNTER — HOSPITAL ENCOUNTER (OUTPATIENT)
Facility: HOSPITAL | Age: 50
Discharge: HOME | End: 2025-01-23
Payer: MEDICAID

## 2025-02-20 NOTE — PROGRESS NOTES
Patient: Americo Davis Jr.    YOB: 1975    Date: 02/27/2025    Primary Care Provider: Josephine Conroy APRN    Chief Complaint   Patient presents with    Cholelithiasis       SUBJECTIVE:    History of present illness:  The patient is in the office today for evaluation of abdominal pain.  He states that he has had multiple years of periumbilical and epigastric abdominal pain that is worse over the last 2 weeks.  Had an episode of vomiting the other night but usually does not have nausea and vomiting.  He has constipation and has tried multiple remedies and senna seems to help the most although he still has relative constipation.  He has relative anorexia.  He has a known duodenal ulcer and continues to take Goody powders at least 4 times a day for headaches.  The following portions of the patient's history were reviewed and updated as appropriate: allergies, current medications, past family history, past medical history, past social history, past surgical history and problem list.      Review of Systems   Constitutional:  Negative for chills, fever and unexpected weight change.   HENT:  Negative for trouble swallowing and voice change.    Eyes:  Negative for visual disturbance.   Respiratory:  Negative for apnea, cough, chest tightness, shortness of breath and wheezing.    Cardiovascular:  Negative for chest pain, palpitations and leg swelling.   Gastrointestinal:  Positive for abdominal pain, constipation, nausea and vomiting. Negative for abdominal distention, anal bleeding, blood in stool, diarrhea and rectal pain.   Endocrine: Negative for cold intolerance and heat intolerance.   Genitourinary:  Negative for difficulty urinating, dysuria, flank pain, scrotal swelling and testicular pain.   Musculoskeletal:  Negative for back pain, gait problem and joint swelling.   Skin:  Negative for color change, rash and wound.   Neurological:  Negative for dizziness, syncope, speech difficulty,  "weakness, numbness and headaches.   Hematological:  Negative for adenopathy. Does not bruise/bleed easily.   Psychiatric/Behavioral:  Negative for confusion. The patient is not nervous/anxious.          Allergies:  Allergies   Allergen Reactions    Sulfa Antibiotics Other (See Comments)     \"causes his insides to burn and his skin gets really red\" reported by patient's spouse        Medications:    Current Outpatient Medications:     bisacodyl (DULCOLAX) 5 MG EC tablet, Take as directed for colon prep, Disp: 4 tablet, Rfl: 0    buprenorphine-naloxone (SUBOXONE) 8-2 MG per SL tablet, Place 2 tablets under the tongue Daily., Disp: , Rfl:     cyclobenzaprine (FLEXERIL) 10 MG tablet, Take 1 tablet by mouth 3 times a day., Disp: , Rfl:     famotidine (PEPCID) 40 MG tablet, Take 1 tablet by mouth Every Night., Disp: 90 tablet, Rfl: 3    ferrous gluconate (FERGON) 324 MG tablet, Take 1 tablet by mouth Daily With Breakfast., Disp: 90 tablet, Rfl: 3    hydroCHLOROthiazide 12.5 MG tablet, Take 1 tablet by mouth Daily., Disp: , Rfl:     hydrOXYzine (ATARAX) 25 MG tablet, Take 1 tablet by mouth every night at bedtime., Disp: , Rfl:     losartan (COZAAR) 50 MG tablet, , Disp: , Rfl:     metoprolol succinate XL (TOPROL-XL) 25 MG 24 hr tablet, Take 1 tablet by mouth Daily., Disp: , Rfl:     naloxone (NARCAN) 4 MG/0.1ML nasal spray, 1 spray into the nostril(s) as directed by provider As Needed (.). use for oversedation and call 911, Disp: 1 each, Rfl: 2    nicotine (NICODERM CQ) 21 MG/24HR patch, Place 1 patch on the skin as directed by provider Daily., Disp: 14 each, Rfl: 0    ondansetron (Zofran) 4 MG tablet, Take 1 tablet by mouth Every 8 (Eight) Hours As Needed for Nausea., Disp: 30 tablet, Rfl: 1    pantoprazole (PROTONIX) 40 MG EC tablet, Take 1 tablet by mouth Daily. Taking 2 per day, Disp: 90 tablet, Rfl: 3    PEG-KCl-NaCl-NaSulf-Na Asc-C (MOVIPREP) 100 g reconstituted solution powder, Take 1,000 mL by mouth Take As " "Directed. Take as directed for colonoscopy prep., Disp: 1 each, Rfl: 0    senna 8.6 MG tablet, Take 2 tablets by mouth Every Night., Disp: 180 tablet, Rfl: 3    History:  Past Medical History:   Diagnosis Date    Anemia     Anxiety     Asthma     Back pain     Bleeding ulcer 04/2024    Body piercing 10/06/2021    ears    CHF (congestive heart failure)     Reported by patient's wife    Chronic venous insufficiency     Colitis     Constipation     COPD (chronic obstructive pulmonary disease)     [place] or callus     Coronary artery disease     Depression     Esophagitis, erosive     followed by Dr. Graff    GERD (gastroesophageal reflux disease)     GI bleed     secondary to gastric ulcer - hospitalized 4/18-4/20/24    Gout     Headache     History of ASCVD (atherosclerotic cardiovascular disease)     History of blood transfusion     Spouse reported no reaction to transfuson     History of fracture 10/06/2021    Hx right pinky fracture - did require surgery    History of heart attack     Spouse reported \"2 light heart attacks at age 30\"     History of stomach ulcers     Hyperlipidemia     Hypertension     Migraine headache     Mild aortic valve sclerosis     Mild mitral insufficiency     Spouse reported MVP and that Dr Preciado said not sever enough to warrant surgery    On home oxygen therapy     Spouse reported at 2L/min NC prn    Peptic ulcer disease     chronic    Poor historian 10/06/2021    Snores     Tattoos     Vision problems     Weight loss     Recent Weight Loss Involuntary For Over A Year Or More       Past Surgical History:   Procedure Laterality Date    APPENDECTOMY      COLONOSCOPY      ENDOSCOPY      ENDOSCOPY N/A 11/5/2021    Procedure: ESOPHAGOGASTRODUODENOSCOPY WITH BIOPSY;  Surgeon: Leonor Graff MD;  Location: [facility] ENDOSCOPY;  Service: Gastroenterology;  Laterality: N/A;    ENDOSCOPY N/A 1/19/2023    Procedure: ESOPHAGOGASTRODUODENOSCOPY WITH BIOPSIES;  Surgeon: Leonor Graff, " "MD;  Location: [facility] ENDOSCOPY;  Service: Gastroenterology;  Laterality: N/A;    ENDOSCOPY N/A 4/19/2024    Procedure: ESOPHAGOGASTRODUODENOSCOPY with biopsy;  Surgeon: Leonor Graff MD;  Location: [facility] ENDOSCOPY;  Service: Gastroenterology;  Laterality: N/A;    ENDOSCOPY N/A 11/20/2024    Procedure: Esophagogastroduodenscopy with biopsy;  Surgeon: Leonor Graff MD;  Location: [facility] ENDOSCOPY;  Service: Gastroenterology;  Laterality: N/A;    FRACTURE SURGERY Right     Pinky finger    WISDOM TOOTH EXTRACTION         Family History   Problem Relation Age of Onset    Arthritis Mother     Thyroid disease Mother     Hypertension Mother     Migraines Sister     Thyroid disease Sister     Hypertension Sister     Mental illness Brother     Cancer Paternal Uncle     Arthritis Maternal Grandmother     Stroke Maternal Grandmother     Colon cancer Neg Hx     Cirrhosis Neg Hx     Liver cancer Neg Hx     Liver disease Neg Hx        Social History     Tobacco Use    Smoking status: Every Day     Current packs/day: 0.50     Average packs/day: 0.5 packs/day for 34.2 years (17.1 ttl pk-yrs)     Types: Cigarettes     Start date: 1991     Passive exposure: Current    Smokeless tobacco: Current     Types: Snuff   Vaping Use    Vaping status: Former    Substances: Nicotine (Spouse reported no use in 2 years), Flavoring    Devices: Disposable   Substance Use Topics    Alcohol use: Not Currently     Comment: social    Drug use: Not Currently     Comment: opiates, snort        OBJECTIVE:    Vital Signs:   Vitals:    02/27/25 1353   BP: 112/68   Pulse: 86   Resp: 12   Temp: 98 °F (36.7 °C)   TempSrc: Temporal   SpO2: 95%   Weight: 68 kg (150 lb)   Height: 177.8 cm (70\")       Physical Exam:       General Appearance:    Alert, cooperative, in no acute distress   Head:    Normocephalic, without obvious abnormality, atraumatic   Eyes:            Normal.  No scleral icterus.  PERRLA    Lungs:     Clear to " auscultation,respirations regular, even and                  unlabored    Heart:    Regular rhythm and normal rate, normal S1 and S2, no            murmur   Abdomen:   Soft and nondistended with no significant tenderness.   Extremities:   Moves all extremities well, no edema, no cyanosis, no             redness   Skin:   No bleeding, bruising or rash   Neurologic:   Normal without gross deficits.   Psychiatric: No evidence of depression or anxiety          Results Review:   I reviewed the patient's new clinical results.  November CBC was reviewed.  CT scan from April 2004 was reviewed including films and I agree with the interpretation.    Review of Systems was reviewed and confirmed as accurate as documented by the MA.    ASSESSMENT/PLAN:    1. Epigastric pain        No evidence of gallstones on ultrasound today.  He has a known ulcer and continues to take Goody powders and I explained to him that this was a bad idea as the ulcer will not heal if he continues to take aspirin type products.  He understands this.  He is seeing a neurologist for his headaches.  Differential for the abdominal pain can include the ulcer as well as the constipation.  Certainly no acute surgical indication.  He will follow-up with me as needed.  He is due for colonoscopy and EGD to be repeated here in the near future by GI.  One could consider a repeat CT scan if symptoms continue.          Electronically signed by Presley Stauffer MD  02/27/25

## 2025-02-26 ENCOUNTER — OFFICE VISIT (OUTPATIENT)
Dept: GASTROENTEROLOGY | Facility: CLINIC | Age: 50
End: 2025-02-26
Payer: MEDICAID

## 2025-02-26 VITALS
SYSTOLIC BLOOD PRESSURE: 120 MMHG | HEART RATE: 72 BPM | WEIGHT: 141 LBS | BODY MASS INDEX: 20.23 KG/M2 | DIASTOLIC BLOOD PRESSURE: 84 MMHG | OXYGEN SATURATION: 97 %

## 2025-02-26 DIAGNOSIS — Z86.0100 PERSONAL HISTORY OF COLON POLYPS, UNSPECIFIED: Chronic | ICD-10-CM

## 2025-02-26 DIAGNOSIS — K27.9 PUD (PEPTIC ULCER DISEASE): Primary | Chronic | ICD-10-CM

## 2025-02-26 DIAGNOSIS — K31.84 GASTROPARESIS: Chronic | ICD-10-CM

## 2025-02-26 DIAGNOSIS — K59.00 CONSTIPATION, UNSPECIFIED CONSTIPATION TYPE: Chronic | ICD-10-CM

## 2025-02-26 DIAGNOSIS — D50.0 IRON DEFICIENCY ANEMIA DUE TO CHRONIC BLOOD LOSS: Chronic | ICD-10-CM

## 2025-02-26 DIAGNOSIS — K20.90 ESOPHAGITIS: Chronic | ICD-10-CM

## 2025-02-26 PROCEDURE — 1160F RVW MEDS BY RX/DR IN RCRD: CPT | Performed by: NURSE PRACTITIONER

## 2025-02-26 PROCEDURE — 1159F MED LIST DOCD IN RCRD: CPT | Performed by: NURSE PRACTITIONER

## 2025-02-26 PROCEDURE — 3079F DIAST BP 80-89 MM HG: CPT | Performed by: NURSE PRACTITIONER

## 2025-02-26 PROCEDURE — 99214 OFFICE O/P EST MOD 30 MIN: CPT | Performed by: NURSE PRACTITIONER

## 2025-02-26 PROCEDURE — 3074F SYST BP LT 130 MM HG: CPT | Performed by: NURSE PRACTITIONER

## 2025-02-26 RX ORDER — BISACODYL 5 MG/1
TABLET, DELAYED RELEASE ORAL
Qty: 4 TABLET | Refills: 0 | Status: SHIPPED | OUTPATIENT
Start: 2025-02-26

## 2025-02-26 RX ORDER — SODIUM CHLORIDE 9 MG/ML
70 INJECTION, SOLUTION INTRAVENOUS CONTINUOUS PRN
OUTPATIENT
Start: 2025-02-26 | End: 2025-02-27

## 2025-02-26 RX ORDER — PEG-3350, SODIUM SULFATE, SODIUM CHLORIDE, POTASSIUM CHLORIDE, SODIUM ASCORBATE AND ASCORBIC ACID 7.5-2.691G
1000 KIT ORAL TAKE AS DIRECTED
Qty: 1 EACH | Refills: 0 | Status: SHIPPED | OUTPATIENT
Start: 2025-02-26

## 2025-02-26 NOTE — PATIENT INSTRUCTIONS
Antireflux measures: Avoid fried, fatty foods, alcohol, chocolate, coffee, tea,  soft drinks, peppermint and spearmint, spicy foods, tomatoes and tomato based foods, onions, peppers, and smoking.   Other antireflux measures include weight reduction if overweight, avoiding tight clothing around the abdomen, elevating the head of the bed 6 inches with blocks under the head board, and don't drink or eat before going to bed and avoid lying down immediately after meals.  Avoid vaping/smoking.  Pantoprazole 40 mg 1 by mouth in the am 30 minutes before breakfast.  Zofran 4 mg 1 po every 8 hours as needed for nausea.   Avoid all NSAIDs, including Ibuprofen, Motrin, Advil, Aleve, Naprosyn, etc.   Do not take Excedrin, headache powders, etc.   May use Tylenol/Acetaminophen products if needed.   Patient needs further evaluation of his headaches/migraines - discuss with PCP for further recommendations.   The patient should eat 4-5 very small meals throughout the day. Avoid large meals.  It is recommended to eat a softer diet. Meats are best consumed ground. Fruits and vegetables are best consumed cooked or steamed and then mashed.   Miralax 17 grams daily.   Stool softeners 2 per day.   Senna daily.   Colonoscopy: The indications, technique, alternatives and potential risk and complications were discussed with the patient including but not limited to bleeding, perforations, missing lesions and anesthetic complications. The patient understands and wishes to proceed with the procedure and has given their verbal consent. Written patient education information was given to the patient.   The patient will call if they have further questions before procedure.   Upper endoscopy-EGD: The indications, technique, alternatives and potential risk and complications were discussed with the patient including but not limited to bleeding, perforations, missing lesions and anesthetic complications. The patient understands and wishes to proceed  with the procedure and has given their verbal consent. Written patient education information was given to the patient.   The patient will call if they have further questions before procedure.

## 2025-02-27 ENCOUNTER — OFFICE VISIT (OUTPATIENT)
Dept: SURGERY | Facility: CLINIC | Age: 50
End: 2025-02-27
Payer: MEDICAID

## 2025-02-27 VITALS
RESPIRATION RATE: 12 BRPM | HEART RATE: 86 BPM | WEIGHT: 150 LBS | DIASTOLIC BLOOD PRESSURE: 68 MMHG | HEIGHT: 70 IN | OXYGEN SATURATION: 95 % | SYSTOLIC BLOOD PRESSURE: 112 MMHG | TEMPERATURE: 98 F | BODY MASS INDEX: 21.47 KG/M2

## 2025-02-27 DIAGNOSIS — R10.13 EPIGASTRIC PAIN: Primary | ICD-10-CM

## 2025-02-27 PROCEDURE — 1160F RVW MEDS BY RX/DR IN RCRD: CPT | Performed by: SURGERY

## 2025-02-27 PROCEDURE — 99203 OFFICE O/P NEW LOW 30 MIN: CPT | Performed by: SURGERY

## 2025-02-27 PROCEDURE — 3074F SYST BP LT 130 MM HG: CPT | Performed by: SURGERY

## 2025-02-27 PROCEDURE — 3078F DIAST BP <80 MM HG: CPT | Performed by: SURGERY

## 2025-02-27 PROCEDURE — 1159F MED LIST DOCD IN RCRD: CPT | Performed by: SURGERY

## 2025-04-04 NOTE — PRE-PROCEDURE INSTRUCTIONS
PAT phone history completed with patient's mother for upcoming procedure on 4/10/25.    PAT PASS reviewed with patient's mother and he/she verbalized understanding of the following:     Do not eat or drink anything after midnight the night before procedure unless otherwise instructed by physician/surgeon's office, this includes no gum, candy, mints, tobacco products or e-cigarettes.  Do not shave the area to be operated on at least 48 hours prior to procedure.  Do not wear makeup, lotion, hair products, or nail polish.  Do not wear any jewelry and remove all piercings.  Do not wear any adhesive if you wear dentures.  Do not wear contacts; bring in glasses if needed.  Only take medications on the morning of procedure as instructed by PAT nurse per anesthesia guidelines or as instructed by physician's office.   If you are on any blood thinners reach out to the physician/surgeon's office for instructions on when/if they will need to be stopped prior to procedure.   Bring in picture ID and insurance card, advanced directive copies if applicable, CPAP/BIPAP/Inhalers if indicated morning of procedure, leave any other valuables at home.  Ensure you have arranged for someone to drive you home the day of your procedure and someone to care for you at home afterwards. It is recommended that you do not drive, drink alcohol, or make any major legal decisions for at least 24 hours after your procedure is complete.   Refer to Dr. Graff's  instructions regarding low fiber and clear liquid diets; and bowel prep instructions.     Instructions given on hospital entrance and registration location.

## 2025-04-10 ENCOUNTER — ANESTHESIA (OUTPATIENT)
Dept: GASTROENTEROLOGY | Facility: HOSPITAL | Age: 50
End: 2025-04-10
Payer: MEDICAID

## 2025-04-10 ENCOUNTER — ANESTHESIA EVENT (OUTPATIENT)
Dept: GASTROENTEROLOGY | Facility: HOSPITAL | Age: 50
End: 2025-04-10
Payer: MEDICAID

## 2025-04-10 ENCOUNTER — HOSPITAL ENCOUNTER (OUTPATIENT)
Facility: HOSPITAL | Age: 50
Setting detail: HOSPITAL OUTPATIENT SURGERY
Discharge: HOME | End: 2025-04-10
Attending: INTERNAL MEDICINE | Admitting: INTERNAL MEDICINE
Payer: MEDICAID

## 2025-04-10 VITALS
TEMPERATURE: 98.2 F | OXYGEN SATURATION: 99 % | SYSTOLIC BLOOD PRESSURE: 140 MMHG | HEART RATE: 80 BPM | BODY MASS INDEX: 20.09 KG/M2 | DIASTOLIC BLOOD PRESSURE: 97 MMHG | WEIGHT: 140 LBS | RESPIRATION RATE: 20 BRPM

## 2025-04-10 DIAGNOSIS — Z86.0100 PERSONAL HISTORY OF COLON POLYPS, UNSPECIFIED: ICD-10-CM

## 2025-04-10 DIAGNOSIS — K59.03 DRUG INDUCED CONSTIPATION: ICD-10-CM

## 2025-04-10 PROCEDURE — 25810000003 SODIUM CHLORIDE 0.9 % SOLUTION: Performed by: NURSE PRACTITIONER

## 2025-04-10 PROCEDURE — 45385 COLONOSCOPY W/LESION REMOVAL: CPT | Performed by: INTERNAL MEDICINE

## 2025-04-10 PROCEDURE — 25010000002 PROPOFOL 200 MG/20ML EMULSION: Performed by: NURSE ANESTHETIST, CERTIFIED REGISTERED

## 2025-04-10 PROCEDURE — 25010000002 PROPOFOL 10 MG/ML EMULSION: Performed by: NURSE ANESTHETIST, CERTIFIED REGISTERED

## 2025-04-10 RX ORDER — SODIUM CHLORIDE 9 MG/ML
70 INJECTION, SOLUTION INTRAVENOUS CONTINUOUS PRN
Status: DISCONTINUED | OUTPATIENT
Start: 2025-04-10 | End: 2025-04-10 | Stop reason: HOSPADM

## 2025-04-10 RX ORDER — LIDOCAINE HCL/PF 100 MG/5ML
SYRINGE (ML) INJECTION AS NEEDED
Status: DISCONTINUED | OUTPATIENT
Start: 2025-04-10 | End: 2025-04-10 | Stop reason: SURG

## 2025-04-10 RX ORDER — PROPOFOL 10 MG/ML
INJECTION, EMULSION INTRAVENOUS AS NEEDED
Status: DISCONTINUED | OUTPATIENT
Start: 2025-04-10 | End: 2025-04-10 | Stop reason: SURG

## 2025-04-10 RX ORDER — SIMETHICONE 40MG/0.6ML
SUSPENSION, DROPS(FINAL DOSAGE FORM)(ML) ORAL AS NEEDED
Status: DISCONTINUED | OUTPATIENT
Start: 2025-04-10 | End: 2025-04-10 | Stop reason: HOSPADM

## 2025-04-10 RX ADMIN — PROPOFOL 140 MCG/KG/MIN: 10 INJECTION, EMULSION INTRAVENOUS at 11:22

## 2025-04-10 RX ADMIN — Medication 40 MG: at 11:22

## 2025-04-10 RX ADMIN — SODIUM CHLORIDE: 9 INJECTION, SOLUTION INTRAVENOUS at 11:16

## 2025-04-10 RX ADMIN — PROPOFOL 100 MG: 10 INJECTION, EMULSION INTRAVENOUS at 11:22

## 2025-04-10 NOTE — ANESTHESIA PREPROCEDURE EVALUATION
Anesthesia Evaluation     Patient summary reviewed and Nursing notes reviewed   no history of anesthetic complications:   NPO Solid Status: > 8 hours  NPO Liquid Status: > 8 hours           Airway   Mallampati: II  TM distance: >3 FB  Neck ROM: full  Possible difficult intubation  Dental - normal exam     Pulmonary - normal exam   (+) a smoker, COPD, asthma,shortness of breath  Cardiovascular - normal exam    ECG reviewed  Patient on routine beta blocker  Rhythm: regular  Rate: normal    (+) hypertension, valvular problems/murmurs murmur, hyperlipidemia    ROS comment: Echo 2014  Conclusions  1. The estimated ejection fraction is 55-60%.  Global left ventricular  wall motion and contractility are within normal limits.  2. The right ventricular systolic pressure is calculated at 23 mmHg.      Findings       Technical Comments:  The study quality is good.       Left Ventricle:  The left ventricular chamber size is normal. Global left ventricular  wall motion and contractility are within normal limits. Left ventricular  systolic function is at the lower limits of normal.  The estimated  ejection fraction is 55-60%.       Left Atrium:  The left atrial chamber size is normal.      Right Ventricle:  The right ventricular cavity size is normal. The right ventricular  global systolic function is normal.      Right Atrium:  The right atrial cavity size is normal. No atrial septal defect is  visualized.      Aortic Valve:  The aortic valve is trileaflet. There is no evidence of aortic  regurgitation. There is no evidence of aortic stenosis.      Mitral Valve:  The mitral valve leaflets appear normal. There is no evidence of mitral  valve prolapse. There is a trace of mitral regurgitation. There is no  evidence of mitral stenosis.   Tricuspid Valve:  The tricuspid valve leaflets are normal.  There is mild tricuspid  regurgitation. The right ventricular systolic pressure is calculated at  23 mmHg.       Pulmonic Valve:  The  pulmonic valve appears normal.      Pericardium:  There is no evidence of pericardial effusion.      Aorta:  There is no dilatation of the aortic root.      Pulmonary Artery:  The main pulmonary artery appears normal.      Venous:  The venous system appears normal.       Neuro/Psych  (+) headaches, psychiatric history Anxiety and Depression  GI/Hepatic/Renal/Endo    (+) GERD    Musculoskeletal     (+) back pain  Abdominal  - normal exam    Abdomen: soft.  Bowel sounds: normal.   Substance History      OB/GYN          Other                      Anesthesia Plan    ASA 3     MAC     (Risks and benefits discussed including risk of aspiration, recall and dental damage. All patient questions answered. Will continue with POC. )  intravenous induction     Anesthetic plan, risks, benefits, and alternatives have been provided, discussed and informed consent has been obtained with: patient.  Pre-procedure education provided    CODE STATUS:

## 2025-04-10 NOTE — H&P
"[facility]  HISTORY AND PHYSICAL    Patient Name: Americo Davis Jr.  : 1975  MRN: [id number]    Chief Complaint:   For surveillance colonoscopy    History Of Presenting Illness:    Personal h/o colon polyps  over 5yrs ago    Past Medical History:   Diagnosis Date    Anemia     Anxiety     Asthma     Back pain     Bleeding ulcer 2024    Body piercing 10/06/2021    ears    CHF (congestive heart failure)     Dr. Preciado LOV     Chronic venous insufficiency     Colitis     Constipation     COPD (chronic obstructive pulmonary disease)     [place] or callus     Coronary artery disease     Depression     Esophagitis, erosive     followed by Dr. Graff    GERD (gastroesophageal reflux disease)     GI bleed     secondary to gastric ulcer - hospitalized -24    Gout     Headache     Headache     History of ASCVD (atherosclerotic cardiovascular disease)     History of blood transfusion     Spouse reported no reaction to transfuson     History of fracture 10/06/2021    Hx right pinky fracture - did require surgery    History of heart attack     Spouse reported \"2 light heart attacks at age 30\"     History of stomach ulcers     Hyperlipidemia     Hypertension     Migraine headache     Mild aortic valve sclerosis     Mild mitral insufficiency     Spouse reported MVP and that Dr Preciado said not sever enough to warrant surgery    On home oxygen therapy     Spouse reported at 2L/min NC prn    Peptic ulcer disease     chronic    Poor historian 10/06/2021    Snores     SOB (shortness of breath)     pt's mother reports at baseline 25    Tattoos     Vision problems     Weight loss     Recent Weight Loss Involuntary For Over A Year Or More - dates??       Past Surgical History:   Procedure Laterality Date    APPENDECTOMY      COLONOSCOPY      ENDOSCOPY      ENDOSCOPY N/A 2021    Procedure: ESOPHAGOGASTRODUODENOSCOPY WITH BIOPSY;  Surgeon: Leonor Graff MD;  Location: [facility]" ENDOSCOPY;  Service: Gastroenterology;  Laterality: N/A;    ENDOSCOPY N/A 1/19/2023    Procedure: ESOPHAGOGASTRODUODENOSCOPY WITH BIOPSIES;  Surgeon: Leonor Graff MD;  Location: [facility] ENDOSCOPY;  Service: Gastroenterology;  Laterality: N/A;    ENDOSCOPY N/A 4/19/2024    Procedure: ESOPHAGOGASTRODUODENOSCOPY with biopsy;  Surgeon: Leonor Graff MD;  Location: [facility] ENDOSCOPY;  Service: Gastroenterology;  Laterality: N/A;    ENDOSCOPY N/A 11/20/2024    Procedure: Esophagogastroduodenscopy with biopsy;  Surgeon: Leonor Graff MD;  Location: [facility] ENDOSCOPY;  Service: Gastroenterology;  Laterality: N/A;    FRACTURE SURGERY Right     Pinky finger    WISDOM TOOTH EXTRACTION         Social History     Socioeconomic History    Marital status:    Tobacco Use    Smoking status: Every Day     Current packs/day: 0.50     Average packs/day: 0.5 packs/day for 34.3 years (17.1 ttl pk-yrs)     Types: Cigarettes     Start date: 1991     Passive exposure: Current    Smokeless tobacco: Current     Types: Snuff   Vaping Use    Vaping status: Former    Substances: Nicotine (Spouse reported no use in 2 years), Flavoring    Devices: Disposable   Substance and Sexual Activity    Alcohol use: Not Currently     Comment: social    Drug use: Not Currently     Comment: opiates, snort    Sexual activity: Defer       Family History   Problem Relation Age of Onset    Arthritis Mother     Thyroid disease Mother     Hypertension Mother     Migraines Sister     Thyroid disease Sister     Hypertension Sister     Mental illness Brother     Cancer Paternal Uncle     Arthritis Maternal Grandmother     Stroke Maternal Grandmother     Colon cancer Neg Hx     Cirrhosis Neg Hx     Liver cancer Neg Hx     Liver disease Neg Hx        Prior to Admission Medications:  Medications Prior to Admission   Medication Sig Dispense Refill Last Dose/Taking    buprenorphine-naloxone (SUBOXONE) 8-2 MG per SL tablet Place 2 tablets  "under the tongue Daily.   4/8/2025    cyclobenzaprine (FLEXERIL) 10 MG tablet Take 1 tablet by mouth 3 times a day.   Taking    famotidine (PEPCID) 40 MG tablet Take 1 tablet by mouth Every Night. 90 tablet 3 Taking    ferrous gluconate (FERGON) 324 MG tablet Take 1 tablet by mouth Daily With Breakfast. 90 tablet 3 Taking    hydroCHLOROthiazide 12.5 MG tablet Take 1 tablet by mouth Daily.   Taking    hydrOXYzine (ATARAX) 25 MG tablet Take 1 tablet by mouth every night at bedtime.   Taking    losartan (COZAAR) 50 MG tablet    Taking    metoprolol succinate XL (TOPROL-XL) 25 MG 24 hr tablet Take 1 tablet by mouth Daily.   4/9/2025 Morning    ondansetron (Zofran) 4 MG tablet Take 1 tablet by mouth Every 8 (Eight) Hours As Needed for Nausea. 30 tablet 1 Taking As Needed    pantoprazole (PROTONIX) 40 MG EC tablet Take 1 tablet by mouth Daily. Taking 2 per day 90 tablet 3 Taking    senna 8.6 MG tablet Take 2 tablets by mouth Every Night. 180 tablet 3 Taking    bisacodyl (DULCOLAX) 5 MG EC tablet Take as directed for colon prep 4 tablet 0     naloxone (NARCAN) 4 MG/0.1ML nasal spray 1 spray into the nostril(s) as directed by provider As Needed (.). use for oversedation and call 911 1 each 2     nicotine (NICODERM CQ) 21 MG/24HR patch Place 1 patch on the skin as directed by provider Daily. (Patient not taking: Reported on 4/4/2025) 14 each 0 Not Taking    PEG-KCl-NaCl-NaSulf-Na Asc-C (MOVIPREP) 100 g reconstituted solution powder Take 1,000 mL by mouth Take As Directed. Take as directed for colonoscopy prep. 1 each 0        Allergies:  Allergies   Allergen Reactions    Sulfa Antibiotics Other (See Comments)     \"causes his insides to burn and his skin gets really red\" reported by patient's spouse         Vitals:      Review Of Systems:  Constitutional:  Negative for chills, fever, and unexpected weight change.  Respiratory:  Negative for cough, chest tightness, shortness of breath, and wheezing.  Cardiovascular:  Negative " for chest pain, palpitations, and leg swelling.  Gastrointestinal:  Negative for abdominal distention, abdominal pain, nausea, vomiting.  Neurological:  Negative for weakness, numbness, and headaches.     Physical Exam:    General Appearance:  Alert, cooperative, in no acute distress.   Lungs:   Clear to auscultation, respirations regular, even and                 unlabored.   Heart:  Regular rhythm and normal rate.   Abdomen:   Normal bowel sounds, no masses, no organomegaly. Soft, nontender, nondistended   Neurologic: Alert and oriented x 3. Moves all four limbs equally       Assessment & Plan     Assessment:  Principal Problem:    Personal history of colon polyps, unspecified      Plan: Colonoscopy with possible biopsy, polypectomy, ablation of arteriovenous malformations, or control of bleeding. (N/A)     Leonor Graff MD  4/10/2025

## 2025-04-10 NOTE — ANESTHESIA POSTPROCEDURE EVALUATION
Patient: Americo Davis Jr.    Procedure Summary       Date: 04/10/25 Room / Location: [facility] ENDOSCOPY 2 / [facility] ENDOSCOPY    Anesthesia Start: 1112 Anesthesia Stop: 1148    Procedure: Colonoscopy with polypectomy (Anus) Diagnosis:       Personal history of colon polyps, unspecified      (Personal history of colon polyps, unspecified [Z86.0100])    Surgeons: Leonor Graff MD Provider: Eduardo Moya CRNA    Anesthesia Type: MAC ASA Status: 3            Anesthesia Type: MAC    Vitals  No vitals data found for the desired time range.          Post Anesthesia Care and Evaluation    Patient location during evaluation: bedside  Patient participation: complete - patient participated  Level of consciousness: awake and alert  Pain score: 0  Pain management: adequate    Airway patency: patent  Anesthetic complications: No anesthetic complications  PONV Status: none  Cardiovascular status: acceptable  Respiratory status: acceptable  Hydration status: acceptable

## 2025-04-10 NOTE — DISCHARGE INSTRUCTIONS
- Discharge patient to home (ambulatory).   - High fiber diet.   - Continue present medications.   - Await pathology results.   - Await pathology results.   - Repeat colonoscopy in 3-6 months because the bowel preparation was poor and for surveillance.   - Senna 2 tab po daily  - Mag citrate daily for 2 days before actual  bowel prep before next colonoscopy  - Return to GI office in 8 weeks.     Please follow all post op instructions and follow up appointment time from your physician's office included in your discharge packet.  . No pushing, pulling, tugging,  heavy lifting, or strenuous activity.  No major decision making, driving, or drinking alcoholic beverages for 24 hours. ( due to the medications you have  received)  Always use good hand hygiene/washing techniques.  NO driving while taking pain medications.    * if you have an incision:  Check your incision area every day for signs of infection.   Check for:  * more redness, swelling, or pain  *more fluid or blood  *warmth  *pus or bad smell    To assist you in voiding:  Drink plenty of fluids  Listen to running water while attempting to void.    If you are unable to urinate and you have an uncomfortable urge to void or it has been   6 hours since you were discharged, return to the Emergency Room

## 2025-04-11 ENCOUNTER — APPOINTMENT (OUTPATIENT)
Dept: CT IMAGING | Facility: HOSPITAL | Age: 50
End: 2025-04-11
Payer: MEDICAID

## 2025-04-11 ENCOUNTER — HOSPITAL ENCOUNTER (EMERGENCY)
Facility: HOSPITAL | Age: 50
Discharge: HOME | End: 2025-04-12
Attending: STUDENT IN AN ORGANIZED HEALTH CARE EDUCATION/TRAINING PROGRAM
Payer: MEDICAID

## 2025-04-11 DIAGNOSIS — R10.84 GENERALIZED ABDOMINAL PAIN: Primary | ICD-10-CM

## 2025-04-11 LAB
ALBUMIN SERPL-MCNC: 4.5 G/DL (ref 3.5–5.2)
ALBUMIN/GLOB SERPL: 1.2 G/DL
ALP SERPL-CCNC: 80 U/L (ref 39–117)
ALT SERPL W P-5'-P-CCNC: 7 U/L (ref 1–41)
ANION GAP SERPL CALCULATED.3IONS-SCNC: 15.9 MMOL/L (ref 5–15)
AST SERPL-CCNC: 16 U/L (ref 1–40)
BASOPHILS # BLD AUTO: 0.03 10*3/MM3 (ref 0–0.2)
BASOPHILS NFR BLD AUTO: 0.3 % (ref 0–1.5)
BILIRUB SERPL-MCNC: 0.2 MG/DL (ref 0–1.2)
BILIRUB UR QL STRIP: NEGATIVE
BUN SERPL-MCNC: 14 MG/DL (ref 6–20)
BUN/CREAT SERPL: 16.5 (ref 7–25)
CALCIUM SPEC-SCNC: 10.1 MG/DL (ref 8.6–10.5)
CHLORIDE SERPL-SCNC: 99 MMOL/L (ref 98–107)
CLARITY UR: CLEAR
CO2 SERPL-SCNC: 23.1 MMOL/L (ref 22–29)
COLOR UR: YELLOW
CREAT SERPL-MCNC: 0.85 MG/DL (ref 0.76–1.27)
CRP SERPL-MCNC: 0.36 MG/DL (ref 0–0.5)
D-LACTATE SERPL-SCNC: 1.6 MMOL/L (ref 0.5–2)
DEPRECATED RDW RBC AUTO: 47.4 FL (ref 37–54)
EGFRCR SERPLBLD CKD-EPI 2021: 105.9 ML/MIN/1.73
EOSINOPHIL # BLD AUTO: 0.01 10*3/MM3 (ref 0–0.4)
EOSINOPHIL NFR BLD AUTO: 0.1 % (ref 0.3–6.2)
ERYTHROCYTE [DISTWIDTH] IN BLOOD BY AUTOMATED COUNT: 14 % (ref 12.3–15.4)
FLUAV RNA RESP QL NAA+PROBE: NOT DETECTED
FLUBV RNA RESP QL NAA+PROBE: NOT DETECTED
GLOBULIN UR ELPH-MCNC: 3.7 GM/DL
GLUCOSE SERPL-MCNC: 114 MG/DL (ref 65–99)
GLUCOSE UR STRIP-MCNC: NEGATIVE MG/DL
HCT VFR BLD AUTO: 43.6 % (ref 37.5–51)
HGB BLD-MCNC: 14.6 G/DL (ref 13–17.7)
HGB UR QL STRIP.AUTO: NEGATIVE
IMM GRANULOCYTES # BLD AUTO: 0.03 10*3/MM3 (ref 0–0.05)
IMM GRANULOCYTES NFR BLD AUTO: 0.3 % (ref 0–0.5)
KETONES UR QL STRIP: NEGATIVE
LEUKOCYTE ESTERASE UR QL STRIP.AUTO: NEGATIVE
LIPASE SERPL-CCNC: 20 U/L (ref 13–60)
LYMPHOCYTES # BLD AUTO: 1.48 10*3/MM3 (ref 0.7–3.1)
LYMPHOCYTES NFR BLD AUTO: 14.8 % (ref 19.6–45.3)
MAGNESIUM SERPL-MCNC: 2 MG/DL (ref 1.6–2.6)
MCH RBC QN AUTO: 30.5 PG (ref 26.6–33)
MCHC RBC AUTO-ENTMCNC: 33.5 G/DL (ref 31.5–35.7)
MCV RBC AUTO: 91.2 FL (ref 79–97)
MONOCYTES # BLD AUTO: 0.32 10*3/MM3 (ref 0.1–0.9)
MONOCYTES NFR BLD AUTO: 3.2 % (ref 5–12)
NEUTROPHILS NFR BLD AUTO: 8.12 10*3/MM3 (ref 1.7–7)
NEUTROPHILS NFR BLD AUTO: 81.3 % (ref 42.7–76)
NITRITE UR QL STRIP: NEGATIVE
NRBC BLD AUTO-RTO: 0 /100 WBC (ref 0–0.2)
PH UR STRIP.AUTO: >=9 [PH] (ref 5–8)
PLATELET # BLD AUTO: 265 10*3/MM3 (ref 140–450)
PMV BLD AUTO: 9.2 FL (ref 6–12)
POTASSIUM SERPL-SCNC: 4.5 MMOL/L (ref 3.5–5.2)
PROT SERPL-MCNC: 8.2 G/DL (ref 6–8.5)
PROT UR QL STRIP: ABNORMAL
RBC # BLD AUTO: 4.78 10*6/MM3 (ref 4.14–5.8)
SARS-COV-2 RNA RESP QL NAA+PROBE: NOT DETECTED
SODIUM SERPL-SCNC: 138 MMOL/L (ref 136–145)
SP GR UR STRIP: 1.01 (ref 1–1.03)
TROPONIN T SERPL HS-MCNC: 6 NG/L
UROBILINOGEN UR QL STRIP: ABNORMAL
WBC NRBC COR # BLD AUTO: 9.99 10*3/MM3 (ref 3.4–10.8)

## 2025-04-11 PROCEDURE — 25010000002 ONDANSETRON PER 1 MG: Performed by: STUDENT IN AN ORGANIZED HEALTH CARE EDUCATION/TRAINING PROGRAM

## 2025-04-11 PROCEDURE — 84484 ASSAY OF TROPONIN QUANT: CPT | Performed by: STUDENT IN AN ORGANIZED HEALTH CARE EDUCATION/TRAINING PROGRAM

## 2025-04-11 PROCEDURE — 86140 C-REACTIVE PROTEIN: CPT | Performed by: STUDENT IN AN ORGANIZED HEALTH CARE EDUCATION/TRAINING PROGRAM

## 2025-04-11 PROCEDURE — 99285 EMERGENCY DEPT VISIT HI MDM: CPT | Performed by: STUDENT IN AN ORGANIZED HEALTH CARE EDUCATION/TRAINING PROGRAM

## 2025-04-11 PROCEDURE — 93005 ELECTROCARDIOGRAM TRACING: CPT | Performed by: STUDENT IN AN ORGANIZED HEALTH CARE EDUCATION/TRAINING PROGRAM

## 2025-04-11 PROCEDURE — 96375 TX/PRO/DX INJ NEW DRUG ADDON: CPT

## 2025-04-11 PROCEDURE — 83735 ASSAY OF MAGNESIUM: CPT | Performed by: STUDENT IN AN ORGANIZED HEALTH CARE EDUCATION/TRAINING PROGRAM

## 2025-04-11 PROCEDURE — 96374 THER/PROPH/DIAG INJ IV PUSH: CPT

## 2025-04-11 PROCEDURE — 80053 COMPREHEN METABOLIC PANEL: CPT | Performed by: STUDENT IN AN ORGANIZED HEALTH CARE EDUCATION/TRAINING PROGRAM

## 2025-04-11 PROCEDURE — 25010000002 MORPHINE PER 10 MG: Performed by: STUDENT IN AN ORGANIZED HEALTH CARE EDUCATION/TRAINING PROGRAM

## 2025-04-11 PROCEDURE — 74177 CT ABD & PELVIS W/CONTRAST: CPT

## 2025-04-11 PROCEDURE — 83605 ASSAY OF LACTIC ACID: CPT | Performed by: STUDENT IN AN ORGANIZED HEALTH CARE EDUCATION/TRAINING PROGRAM

## 2025-04-11 PROCEDURE — 83690 ASSAY OF LIPASE: CPT | Performed by: STUDENT IN AN ORGANIZED HEALTH CARE EDUCATION/TRAINING PROGRAM

## 2025-04-11 PROCEDURE — 87636 SARSCOV2 & INF A&B AMP PRB: CPT | Performed by: STUDENT IN AN ORGANIZED HEALTH CARE EDUCATION/TRAINING PROGRAM

## 2025-04-11 PROCEDURE — 85025 COMPLETE CBC W/AUTO DIFF WBC: CPT | Performed by: STUDENT IN AN ORGANIZED HEALTH CARE EDUCATION/TRAINING PROGRAM

## 2025-04-11 PROCEDURE — 81003 URINALYSIS AUTO W/O SCOPE: CPT | Performed by: STUDENT IN AN ORGANIZED HEALTH CARE EDUCATION/TRAINING PROGRAM

## 2025-04-11 RX ORDER — ONDANSETRON 2 MG/ML
4 INJECTION INTRAMUSCULAR; INTRAVENOUS ONCE
Status: COMPLETED | OUTPATIENT
Start: 2025-04-11 | End: 2025-04-11

## 2025-04-11 RX ADMIN — ONDANSETRON 4 MG: 2 INJECTION INTRAMUSCULAR; INTRAVENOUS at 23:20

## 2025-04-11 RX ADMIN — MORPHINE SULFATE 4 MG: 4 INJECTION, SOLUTION INTRAMUSCULAR; INTRAVENOUS at 23:20

## 2025-04-11 NOTE — Clinical Note
[facility] EMERGENCY DEPARTMENT  [address] [address]  Phone: [phone]    Americo Davis was seen and treated in our emergency department on 4/11/2025.  He may return to work on 04/15/2025.         Thank you for choosing [facility].    Ayden Burns MD

## 2025-04-11 NOTE — Clinical Note
[facility] EMERGENCY DEPARTMENT  [address] [address]  Phone: [phone]    Americo Davis was seen and treated in our emergency department on 4/11/2025.  He may return to work on 04/15/2025.         Thank you for choosing [facility].    Ayden Burns MD

## 2025-04-12 VITALS
TEMPERATURE: 98.5 F | BODY MASS INDEX: 20.04 KG/M2 | HEIGHT: 70 IN | SYSTOLIC BLOOD PRESSURE: 158 MMHG | HEART RATE: 93 BPM | RESPIRATION RATE: 18 BRPM | OXYGEN SATURATION: 93 % | WEIGHT: 139.99 LBS | DIASTOLIC BLOOD PRESSURE: 106 MMHG

## 2025-04-12 PROCEDURE — 96376 TX/PRO/DX INJ SAME DRUG ADON: CPT

## 2025-04-12 PROCEDURE — 25510000001 IOPAMIDOL 61 % SOLUTION: Performed by: STUDENT IN AN ORGANIZED HEALTH CARE EDUCATION/TRAINING PROGRAM

## 2025-04-12 PROCEDURE — 25010000002 MORPHINE PER 10 MG: Performed by: STUDENT IN AN ORGANIZED HEALTH CARE EDUCATION/TRAINING PROGRAM

## 2025-04-12 RX ORDER — IOPAMIDOL 612 MG/ML
100 INJECTION, SOLUTION INTRAVASCULAR
Status: COMPLETED | OUTPATIENT
Start: 2025-04-12 | End: 2025-04-12

## 2025-04-12 RX ORDER — SENNOSIDES A AND B 8.6 MG/1
1 TABLET, FILM COATED ORAL DAILY
Qty: 14 TABLET | Refills: 0 | Status: SHIPPED | OUTPATIENT
Start: 2025-04-12

## 2025-04-12 RX ORDER — POLYETHYLENE GLYCOL 3350 17 G/17G
17 POWDER, FOR SOLUTION ORAL DAILY
Qty: 30 PACKET | Refills: 0 | Status: SHIPPED | OUTPATIENT
Start: 2025-04-12

## 2025-04-12 RX ORDER — DOCUSATE SODIUM 100 MG/1
100 CAPSULE, LIQUID FILLED ORAL 2 TIMES DAILY
Qty: 60 CAPSULE | Refills: 0 | Status: SHIPPED | OUTPATIENT
Start: 2025-04-12 | End: 2025-05-12

## 2025-04-12 RX ORDER — ONDANSETRON 4 MG/1
4 TABLET, ORALLY DISINTEGRATING ORAL EVERY 8 HOURS PRN
Qty: 20 TABLET | Refills: 0 | Status: SHIPPED | OUTPATIENT
Start: 2025-04-12

## 2025-04-12 RX ADMIN — IOPAMIDOL 85 ML: 612 INJECTION, SOLUTION INTRAVENOUS at 00:25

## 2025-04-12 RX ADMIN — MORPHINE SULFATE 8 MG: 4 INJECTION, SOLUTION INTRAMUSCULAR; INTRAVENOUS at 00:46

## 2025-04-12 NOTE — DISCHARGE INSTRUCTIONS
Your evaluated for abdominal pain.  We got lab work and a CT scan which showed no concerns for any acute process.  You are stable for discharge.  As we discussed, we believe your symptoms may be related to constipation after your procedure but see no concerns for any damage to your bowel or significant infection.  We have sent more of your medication for MiraLAX, Colace, senna and will continue to follow with Dr. Dr. Graff for further evaluation of your abdominal pain.  You are now stable for discharge.

## 2025-04-12 NOTE — ED PROVIDER NOTES
Subjective:  History of Present Illness:    Patient is a 49-year-old male with history of peptic ulcer disease, CHF, COPD, CAD, GERD, gout, hypertension, hyperlipidemia who presents today with abdominal pain.  Patient reports severe left lower quadrant pain with onset just prior to arrival.  Reports that he had a colonoscopy performed less than 24 hours ago.  Did have samples taken at that time.  He denies any fevers.  No chest pain or shortness of breath.  Denies any dysuria.  No new leg swelling or leg pain.      Nurses Notes reviewed and agree, including vitals, allergies, social history and prior medical history.     REVIEW OF SYSTEMS: All systems reviewed and not pertinent unless noted.  Review of Systems   Constitutional:  Positive for activity change. Negative for appetite change, chills, fatigue and fever.   HENT:  Negative for congestion, sinus pressure, sneezing and trouble swallowing.    Eyes:  Negative for discharge and itching.   Respiratory:  Negative for cough and shortness of breath.    Cardiovascular:  Negative for chest pain and palpitations.   Gastrointestinal:  Positive for abdominal pain, nausea and vomiting. Negative for abdominal distention, blood in stool, constipation and diarrhea.   Endocrine: Negative for cold intolerance and heat intolerance.   Genitourinary:  Negative for decreased urine volume, dysuria and urgency.   Musculoskeletal:  Negative for gait problem, neck pain and neck stiffness.   Skin:  Negative for color change and rash.   Allergic/Immunologic: Negative for immunocompromised state.   Neurological:  Negative for facial asymmetry and headaches.   Hematological:  Negative for adenopathy.   Psychiatric/Behavioral:  Negative for self-injury and suicidal ideas.        Past Medical History:   Diagnosis Date    Anemia     Anxiety     Asthma     Back pain     Bleeding ulcer 04/2024    Body piercing 10/06/2021    ears    CHF (congestive heart failure)     Dr. Preciado LOV 2023     "Chronic venous insufficiency     Colitis     Constipation     COPD (chronic obstructive pulmonary disease)     [place] or callus     Coronary artery disease     Depression     Esophagitis, erosive     followed by Dr. Graff    GERD (gastroesophageal reflux disease)     GI bleed     secondary to gastric ulcer - hospitalized 4/18-4/20/24    Gout     Headache     Headache     History of ASCVD (atherosclerotic cardiovascular disease)     History of blood transfusion     Spouse reported no reaction to transfuson     History of fracture 10/06/2021    Hx right pinky fracture - did require surgery    History of heart attack     Spouse reported \"2 light heart attacks at age 30\"     History of stomach ulcers     Hyperlipidemia     Hypertension     Migraine headache     Mild aortic valve sclerosis     Mild mitral insufficiency     Spouse reported MVP and that Dr Preciado said not sever enough to warrant surgery    On home oxygen therapy     Spouse reported at 2L/min NC prn    Peptic ulcer disease     chronic    Poor historian 10/06/2021    Snores     SOB (shortness of breath)     pt's mother reports at baseline 4/4/25    Tattoos     Vision problems     Weight loss     Recent Weight Loss Involuntary For Over A Year Or More - dates??       Allergies:    Sulfa antibiotics      Past Surgical History:   Procedure Laterality Date    APPENDECTOMY      COLONOSCOPY      COLONOSCOPY N/A 4/10/2025    Procedure: Colonoscopy with polypectomy;  Surgeon: Leonor Graff MD;  Location: [facility] ENDOSCOPY;  Service: Gastroenterology;  Laterality: N/A;    ENDOSCOPY      ENDOSCOPY N/A 11/5/2021    Procedure: ESOPHAGOGASTRODUODENOSCOPY WITH BIOPSY;  Surgeon: Leonor Graff MD;  Location: [facility] ENDOSCOPY;  Service: Gastroenterology;  Laterality: N/A;    ENDOSCOPY N/A 1/19/2023    Procedure: ESOPHAGOGASTRODUODENOSCOPY WITH BIOPSIES;  Surgeon: Leonor Graff MD;  Location: [facility] ENDOSCOPY;  Service: Gastroenterology;  Laterality: " "N/A;    ENDOSCOPY N/A 4/19/2024    Procedure: ESOPHAGOGASTRODUODENOSCOPY with biopsy;  Surgeon: Leonor Graff MD;  Location: [facility] ENDOSCOPY;  Service: Gastroenterology;  Laterality: N/A;    ENDOSCOPY N/A 11/20/2024    Procedure: Esophagogastroduodenscopy with biopsy;  Surgeon: Leonor Graff MD;  Location: [facility] ENDOSCOPY;  Service: Gastroenterology;  Laterality: N/A;    FRACTURE SURGERY Right     Pinky finger    WISDOM TOOTH EXTRACTION           Social History     Socioeconomic History    Marital status:    Tobacco Use    Smoking status: Every Day     Current packs/day: 0.50     Average packs/day: 0.5 packs/day for 34.3 years (17.1 ttl pk-yrs)     Types: Cigarettes     Start date: 1991     Passive exposure: Current    Smokeless tobacco: Current     Types: Snuff   Vaping Use    Vaping status: Former    Substances: Nicotine (Spouse reported no use in 2 years), Flavoring    Devices: Disposable   Substance and Sexual Activity    Alcohol use: Not Currently     Comment: social    Drug use: Not Currently     Comment: opiates, snort    Sexual activity: Defer         Family History   Problem Relation Age of Onset    Arthritis Mother     Thyroid disease Mother     Hypertension Mother     Migraines Sister     Thyroid disease Sister     Hypertension Sister     Mental illness Brother     Cancer Paternal Uncle     Arthritis Maternal Grandmother     Stroke Maternal Grandmother     Colon cancer Neg Hx     Cirrhosis Neg Hx     Liver cancer Neg Hx     Liver disease Neg Hx        Objective  Physical Exam:  BP (!) 158/106   Pulse 93   Temp 98.5 °F (36.9 °C) (Oral)   Resp 18   Ht 177.8 cm (70\")   Wt 63.5 kg (139 lb 15.9 oz)   SpO2 93%   BMI 20.09 kg/m²      Physical Exam  Constitutional:       General: He is in acute distress.      Appearance: Normal appearance. He is normal weight. He is not ill-appearing or toxic-appearing.   HENT:      Head: Normocephalic and atraumatic.      Nose: Nose normal. No " congestion or rhinorrhea.      Mouth/Throat:      Mouth: Mucous membranes are moist.      Pharynx: Oropharynx is clear.   Eyes:      Extraocular Movements: Extraocular movements intact.      Conjunctiva/sclera: Conjunctivae normal.      Pupils: Pupils are equal, round, and reactive to light.   Cardiovascular:      Rate and Rhythm: Normal rate and regular rhythm.      Pulses: Normal pulses.   Pulmonary:      Effort: Pulmonary effort is normal. No respiratory distress.      Breath sounds: Normal breath sounds.   Abdominal:      General: Abdomen is flat. Bowel sounds are normal. There is no distension.      Palpations: Abdomen is soft.      Tenderness: There is no abdominal tenderness in the left lower quadrant. There is guarding. There is no right CVA tenderness, left CVA tenderness or rebound.   Musculoskeletal:         General: No swelling or tenderness. Normal range of motion.      Cervical back: Normal range of motion and neck supple. No rigidity or tenderness.   Skin:     General: Skin is warm and dry.      Capillary Refill: Capillary refill takes less than 2 seconds.   Neurological:      General: No focal deficit present.      Mental Status: He is alert and oriented to person, place, and time. Mental status is at baseline.      Cranial Nerves: No cranial nerve deficit.      Sensory: No sensory deficit.      Motor: No weakness.   Psychiatric:         Mood and Affect: Mood normal.         Behavior: Behavior normal.         Thought Content: Thought content normal.         Judgment: Judgment normal.         Procedures    ED Course:    ED Course as of 04/12/25 0410   Fri Apr 11, 2025   2332 EKG interpreted by me, normal sinus rhythm no concerning ST changes noted, rate of 82 [JE]   Sat Apr 12, 2025   0137 CT abdomen pelvis independently interpreted by me showing no obvious pneumoperitoneum, no concern for obvious perforation [JE]      ED Course User Index  [JE] Ayden Burns MD       Lab Results (last 24  hours)       Procedure Component Value Units Date/Time    CBC & Differential [[id number]]  (Abnormal) Collected: 04/11/25 2307    Specimen: Blood Updated: 04/11/25 2314    Narrative:      The following orders were created for panel order CBC & Differential.  Procedure                               Abnormality         Status                     ---------                               -----------         ------                     CBC Auto Differential[[id number]]        Abnormal            Final result                 Please view results for these tests on the individual orders.    Comprehensive Metabolic Panel [[id number]]  (Abnormal) Collected: 04/11/25 2307    Specimen: Blood Updated: 04/11/25 2344     Glucose 114 mg/dL      BUN 14 mg/dL      Creatinine 0.85 mg/dL      Sodium 138 mmol/L      Potassium 4.5 mmol/L      Comment: Slight hemolysis detected by analyzer. Result may be falsely elevated.        Chloride 99 mmol/L      CO2 23.1 mmol/L      Calcium 10.1 mg/dL      Total Protein 8.2 g/dL      Albumin 4.5 g/dL      ALT (SGPT) 7 U/L      AST (SGOT) 16 U/L      Comment: Slight hemolysis detected by analyzer. Result may be falsely elevated.        Alkaline Phosphatase 80 U/L      Total Bilirubin 0.2 mg/dL      Globulin 3.7 gm/dL      A/G Ratio 1.2 g/dL      BUN/Creatinine Ratio 16.5     Anion Gap 15.9 mmol/L      eGFR 105.9 mL/min/1.73     Narrative:      GFR Categories in Chronic Kidney Disease (CKD)      GFR Category          GFR (mL/min/1.73)    Interpretation  G1                     90 or greater         Normal or high (1)  G2                      60-89                Mild decrease (1)  G3a                   45-59                Mild to moderate decrease  G3b                   30-44                Moderate to severe decrease  G4                    15-29                Severe decrease  G5                    14 or less           Kidney failure          (1)In the absence of evidence of kidney disease, neither GFR  category G1 or G2 fulfill the criteria for CKD.    eGFR calculation 2021 CKD-EPI creatinine equation, which does not include race as a factor    Lipase [[id number]]  (Normal) Collected: 04/11/25 2307    Specimen: Blood Updated: 04/11/25 2342     Lipase 20 U/L     COVID-19 and FLU A/B PCR, 1 HR TAT - Swab, Nasopharynx [[id number]]  (Normal) Collected: 04/11/25 2307    Specimen: Swab from Nasopharynx Updated: 04/11/25 2333     COVID19 Not Detected     Influenza A PCR Not Detected     Influenza B PCR Not Detected    Narrative:      Fact sheet for providers: https://www.fda.gov/media/763562/download    Fact sheet for patients: https://www.fda.gov/media/529912/download    Test performed by PCR.    High Sensitivity Troponin T [[id number]]  (Normal) Collected: 04/11/25 2307    Specimen: Blood Updated: 04/11/25 2342     HS Troponin T 6 ng/L     Narrative:      High Sensitive Troponin T Reference Range:  <14.0 ng/L- Negative Female for AMI  <22.0 ng/L- Negative Male for AMI  >=14 - Abnormal Female indicating possible myocardial injury.  >=22 - Abnormal Male indicating possible myocardial injury.   Clinicians would have to utilize clinical acumen, EKG, Troponin, and serial changes to determine if it is an Acute Myocardial Infarction or myocardial injury due to an underlying chronic condition.         C-reactive Protein [[id number]]  (Normal) Collected: 04/11/25 2307    Specimen: Blood Updated: 04/11/25 2342     C-Reactive Protein 0.36 mg/dL     Lactic Acid, Plasma [[id number]]  (Normal) Collected: 04/11/25 2307    Specimen: Blood Updated: 04/11/25 2338     Lactate 1.6 mmol/L     Magnesium [[id number]]  (Normal) Collected: 04/11/25 2307    Specimen: Blood Updated: 04/11/25 2344     Magnesium 2.0 mg/dL     CBC Auto Differential [[id number]]  (Abnormal) Collected: 04/11/25 2307    Specimen: Blood Updated: 04/11/25 2314     WBC 9.99 10*3/mm3      RBC 4.78 10*6/mm3      Hemoglobin 14.6 g/dL      Hematocrit 43.6 %      MCV 91.2 fL       MCH 30.5 pg      MCHC 33.5 g/dL      RDW 14.0 %      RDW-SD 47.4 fl      MPV 9.2 fL      Platelets 265 10*3/mm3      Neutrophil % 81.3 %      Lymphocyte % 14.8 %      Monocyte % 3.2 %      Eosinophil % 0.1 %      Basophil % 0.3 %      Immature Grans % 0.3 %      Neutrophils, Absolute 8.12 10*3/mm3      Lymphocytes, Absolute 1.48 10*3/mm3      Monocytes, Absolute 0.32 10*3/mm3      Eosinophils, Absolute 0.01 10*3/mm3      Basophils, Absolute 0.03 10*3/mm3      Immature Grans, Absolute 0.03 10*3/mm3      nRBC 0.0 /100 WBC     Urinalysis With Culture If Indicated - Urine, Clean Catch [[id number]]  (Abnormal) Collected: 04/11/25 2322    Specimen: Urine, Clean Catch Updated: 04/11/25 2333     Color, UA Yellow     Appearance, UA Clear     pH, UA >=9.0     Specific Gravity, UA 1.013     Glucose, UA Negative     Ketones, UA Negative     Bilirubin, UA Negative     Blood, UA Negative     Protein, UA Trace     Leuk Esterase, UA Negative     Nitrite, UA Negative     Urobilinogen, UA 0.2 E.U./dL    Narrative:      In absence of clinical symptoms, the presence of pyuria, bacteria, and/or nitrites on the urinalysis result does not correlate with infection.  Urine microscopic not indicated.             CT Abdomen Pelvis With Contrast  Result Date: 4/12/2025  FINAL REPORT TECHNIQUE: null CLINICAL HISTORY: Left lower quadrant pain, recent colonoscopy, eval perforation COMPARISON: null FINDINGS: CT abdomen and pelvis with contrast Comparison: 04/18/2024 and 11/28/2023. Findings: Full study including information received at 1:06 a.m. CDT 04/12/2025. Slight linear, groundglass, and intraparenchymal microcystic changes bilateral lung bases again noted probably related to emphysematous changes and atelectasis cannot exclude atypical pneumonia in the appropriate clinical setting. Clinical correlation and if indicated CT chest follow-up recommended. Punctate hypodensity right lobe liver unchanged probably cyst or hemangioma but nonspecific.  Slight nodular thickening left adrenal gland unchanged. Few tiny hypodensities in kidneys too small for accurate  CT characterization unchanged likely cysts. Intrarenal calculi greater on the right. No hydronephrosis. Normal symmetric kidney size. Unremarkable right adrenal gland, gallbladder, pancreas, and spleen. Arteriosclerosis. No abdominal aortic aneurysm or dissection. 2 cm rounded nodular density distal collapsed stomach decreased in prominence compared to 11/28/2023 mildly increased compared to 04/18/2024 (previously described on report of 11/28/2023) probably related to appearance of gastric wall in collapsed state. True mass cannot entirely be excluded less likely. Upper GI study or EGD may be confirmatory if not yet performed. Moderate fecal retention mildly increased. Cecum extends into right lower pelvis. Appendix not definitively visualized. No  secondary inflammatory changes in expected location. Correlation with past surgical history recommended. If clinically indicated, full contrast CT follow-up recommended. No bowel distention, ascites, pneumoperitoneum, abscess, or lymphadenopathy. Slight  leftward curvature lumbar spine unchanged probably levoscoliosis. Spondylosis.     Impression: Impression: 1. Appendix not visualized. 2. No bowel distention or pneumoperitoneum. 3. Other findings described above. Authenticated and Electronically Signed by Melissa Marcos MD on 04/12/2025 02:11:30 AM         MDM      Initial impression of presenting illness: Abdominal pain    DDX: includes but is not limited to: Perforated diverticula, diverticulitis, postoperative pain, gastroenteritis    Patient arrives stable with vitals interpreted by myself.     Pertinent features from physical exam: Clear to auscultation, regular rate and rhythm, no murmur, tender to palpation left lower quadrant with mild guarding, no rebound.    Initial diagnostic plan: CBC, CMP, lipase, UA, CRP, lactic acid, CT abdomen  pelvis    Results from initial plan were reviewed and interpreted by me revealing no concerns for significant intra-abdominal process on CT imaging, patient with constipation seen on CT imaging which may be the etiology of her presentation today    Diagnostic information from other sources: Discussed with patient's mother at bedside reviewed past medical records    Interventions / Re-evaluation: Given morphine, Zofran for symptom control    Results/clinical rationale were discussed with patient at bedside    Consultations/Discussion of results with other physicians: Discussed diagnosis of constipation as possible etiology of his presentation today.  Have prescribed MiraLAX, Colace, senna for bowel regimen and encourage close follow-up with his gastroenterologist for further discussion of this.  Strict turn precaution for severe increase in abdominal pain    Disposition plan: Discharge  -----        Final diagnoses:   Generalized abdominal pain          Ayden Burns MD  04/12/25 0417

## 2025-04-14 LAB — REF LAB TEST METHOD: NORMAL

## 2025-04-22 ENCOUNTER — TELEPHONE (OUTPATIENT)
Dept: GASTROENTEROLOGY | Facility: CLINIC | Age: 50
End: 2025-04-22
Payer: MEDICAID

## 2025-04-22 RX ORDER — FOLIC ACID 1 MG/1
1 TABLET ORAL DAILY
COMMUNITY
Start: 2025-04-09

## 2025-04-22 RX ORDER — PAROXETINE 20 MG/1
1 TABLET, FILM COATED ORAL DAILY
COMMUNITY
Start: 2025-04-02

## 2025-04-22 NOTE — PRE-PROCEDURE INSTRUCTIONS
PAT phone history completed with patient for upcoming procedure on 4/25/25 with Dr. Graff.    PAT PASS reviewed with patient and they verbalize understanding of the following:     Do not eat or drink anything after midnight the night before procedure unless otherwise instructed by physician/surgeon's office, this includes no gum, candy, mints, tobacco products or e-cigarettes.  Do not shave the area to be operated on at least 48 hours prior to procedure.  Do not wear makeup, lotion, hair products, or nail polish.  Do not wear any jewelry and remove all piercings.  Do not wear any adhesive if you wear dentures.  Do not wear contacts; bring in glasses if needed.  Only take medications on the morning of procedure as instructed by PAT nurse per anesthesia guidelines or as instructed by physician's office.  If you are on any blood thinners reach out to the physician/surgeon's office for instructions on when/if they will need to be stopped prior to procedure.  Bring in picture ID and insurance card, advanced directive copies if applicable, CPAP/BIPAP/Inhalers if indicated morning of procedure, leave any other valuables at home.  Ensure you have arranged for someone to drive you home the day of your procedure and someone to care for you at home afterwards. It is recommended that you do not drive, drink alcohol, or make any major legal decisions for at least 24 hours after your procedure is complete.  ERAS instructions given unless otherwise instructed per surgeon's orders.    Instructions given on hospital entrance and registration location.

## 2025-04-22 NOTE — TELEPHONE ENCOUNTER
----- Message from Kirstie PERALES sent at 4/17/2025  6:03 PM EDT -----  Looking for a cancellation for EGD.  Mother has been informed, also knows if he has severe or worsening pain, before I call them with a date, to go to ER.  ----- Message -----  From: Kan Schmitt APRN  Sent: 4/17/2025   5:36 PM EDT  To: Kirstie Rodgers MA    Check with him to see if he is he still taking the headache powders/NSAIDs. If so, go ahead and move up his EGD to as soon as possible. He has one schedule in May, but he has a known history of peptic ulcer disease and had not stopped taking headache powders/NSAIDs when he was here for his last office visit. I am suspicious of PUD causing his symptoms.  ----- Message -----  From: Kirstie Rodgers MA  Sent: 4/17/2025  12:06 PM EDT  To: DENNY Roberts    Still having problems after colonoscopy. Taking senna, dulcolax, miralax.Doubling over with abd cramps, taking his laxatives, drinking liquids, not eating a lot because it brings on the cramping.  Bowel prep from colonoscopy was not great,

## 2025-04-25 ENCOUNTER — ANESTHESIA (OUTPATIENT)
Dept: GASTROENTEROLOGY | Facility: HOSPITAL | Age: 50
End: 2025-04-25
Payer: MEDICAID

## 2025-04-25 ENCOUNTER — ANESTHESIA EVENT (OUTPATIENT)
Dept: GASTROENTEROLOGY | Facility: HOSPITAL | Age: 50
End: 2025-04-25
Payer: MEDICAID

## 2025-04-25 ENCOUNTER — HOSPITAL ENCOUNTER (OUTPATIENT)
Facility: HOSPITAL | Age: 50
Setting detail: HOSPITAL OUTPATIENT SURGERY
Discharge: HOME | End: 2025-04-25
Attending: INTERNAL MEDICINE | Admitting: INTERNAL MEDICINE
Payer: MEDICAID

## 2025-04-25 VITALS
OXYGEN SATURATION: 96 % | SYSTOLIC BLOOD PRESSURE: 136 MMHG | DIASTOLIC BLOOD PRESSURE: 89 MMHG | TEMPERATURE: 97.7 F | RESPIRATION RATE: 20 BRPM | HEART RATE: 90 BPM

## 2025-04-25 DIAGNOSIS — D50.0 IRON DEFICIENCY ANEMIA DUE TO CHRONIC BLOOD LOSS: ICD-10-CM

## 2025-04-25 DIAGNOSIS — K27.9 PUD (PEPTIC ULCER DISEASE): ICD-10-CM

## 2025-04-25 PROCEDURE — 25810000003 SODIUM CHLORIDE 0.9 % SOLUTION: Performed by: NURSE PRACTITIONER

## 2025-04-25 PROCEDURE — 43239 EGD BIOPSY SINGLE/MULTIPLE: CPT | Performed by: INTERNAL MEDICINE

## 2025-04-25 PROCEDURE — 25010000002 PROPOFOL 200 MG/20ML EMULSION: Performed by: NURSE ANESTHETIST, CERTIFIED REGISTERED

## 2025-04-25 RX ORDER — KETAMINE HCL IN NACL, ISO-OSM 100MG/10ML
SYRINGE (ML) INJECTION AS NEEDED
Status: DISCONTINUED | OUTPATIENT
Start: 2025-04-25 | End: 2025-04-25 | Stop reason: SURG

## 2025-04-25 RX ORDER — LIDOCAINE HCL/PF 100 MG/5ML
SYRINGE (ML) INJECTION AS NEEDED
Status: DISCONTINUED | OUTPATIENT
Start: 2025-04-25 | End: 2025-04-25 | Stop reason: SURG

## 2025-04-25 RX ORDER — SODIUM CHLORIDE 9 MG/ML
70 INJECTION, SOLUTION INTRAVENOUS CONTINUOUS PRN
Status: DISCONTINUED | OUTPATIENT
Start: 2025-04-25 | End: 2025-04-25 | Stop reason: HOSPADM

## 2025-04-25 RX ORDER — PROPOFOL 10 MG/ML
INJECTION, EMULSION INTRAVENOUS AS NEEDED
Status: DISCONTINUED | OUTPATIENT
Start: 2025-04-25 | End: 2025-04-25 | Stop reason: SURG

## 2025-04-25 RX ADMIN — Medication 40 MG: at 11:43

## 2025-04-25 RX ADMIN — SODIUM CHLORIDE 70 ML/HR: 9 INJECTION, SOLUTION INTRAVENOUS at 11:13

## 2025-04-25 RX ADMIN — PROPOFOL 100 MG: 10 INJECTION, EMULSION INTRAVENOUS at 11:43

## 2025-04-25 RX ADMIN — PROPOFOL 100 MG: 10 INJECTION, EMULSION INTRAVENOUS at 11:47

## 2025-04-25 RX ADMIN — Medication 15 MG: at 11:43

## 2025-04-25 NOTE — ANESTHESIA PREPROCEDURE EVALUATION
Anesthesia Evaluation     Patient summary reviewed and Nursing notes reviewed   no history of anesthetic complications:   NPO Solid Status: > 8 hours  NPO Liquid Status: > 2 hours           Airway   Mallampati: II  TM distance: >3 FB  Neck ROM: full  Possible difficult intubation  Dental - normal exam     Pulmonary - normal exam   (+) a smoker Current, COPD, asthma,home oxygen, shortness of breath  Cardiovascular - normal exam    ECG reviewed  Patient on routine beta blocker  Rhythm: regular  Rate: normal    (+) hypertension, valvular problems/murmurs murmur, CAD, CHF , hyperlipidemia    ROS comment: Echo 2014  Conclusions  1. The estimated ejection fraction is 55-60%.  Global left ventricular  wall motion and contractility are within normal limits.  2. The right ventricular systolic pressure is calculated at 23 mmHg.      Findings       Technical Comments:  The study quality is good.       Left Ventricle:  The left ventricular chamber size is normal. Global left ventricular  wall motion and contractility are within normal limits. Left ventricular  systolic function is at the lower limits of normal.  The estimated  ejection fraction is 55-60%.       Left Atrium:  The left atrial chamber size is normal.      Right Ventricle:  The right ventricular cavity size is normal. The right ventricular  global systolic function is normal.      Right Atrium:  The right atrial cavity size is normal. No atrial septal defect is  visualized.      Aortic Valve:  The aortic valve is trileaflet. There is no evidence of aortic  regurgitation. There is no evidence of aortic stenosis.      Mitral Valve:  The mitral valve leaflets appear normal. There is no evidence of mitral  valve prolapse. There is a trace of mitral regurgitation. There is no  evidence of mitral stenosis.   Tricuspid Valve:  The tricuspid valve leaflets are normal.  There is mild tricuspid  regurgitation. The right ventricular systolic pressure is calculated at  23  mmHg.       Pulmonic Valve:  The pulmonic valve appears normal.      Pericardium:  There is no evidence of pericardial effusion.      Aorta:  There is no dilatation of the aortic root.      Pulmonary Artery:  The main pulmonary artery appears normal.      Venous:  The venous system appears normal.       Neuro/Psych  (+) headaches, psychiatric history Anxiety and Depression  GI/Hepatic/Renal/Endo    (+) GERD, PUD    Musculoskeletal     (+) back pain  Abdominal  - normal exam    Abdomen: soft.  Bowel sounds: normal.   Substance History      OB/GYN          Other                      Anesthesia Plan    ASA 3     MAC     (Risks and benefits discussed including risk of aspiration, recall and dental damage. All patient questions answered. Will continue with POC. )  intravenous induction     Anesthetic plan, risks, benefits, and alternatives have been provided, discussed and informed consent has been obtained with: patient.  Pre-procedure education provided      CODE STATUS:

## 2025-04-25 NOTE — H&P
"    [facility]  HISTORY AND PHYSICAL    Patient Name: Americo Davis Jr.  : 1975  MRN: [id number]    Chief Complaint:   For EGD    History Of Presenting Illness:    History of peptic ulcer disease for surveillance    Past Medical History:   Diagnosis Date    Anemia     Anxiety     Asthma     Back pain     Bleeding ulcer 2024    Body piercing 10/06/2021    ears    CHF (congestive heart failure)     Dr. Preciado LOV     Chronic venous insufficiency     Colitis     Constipation     COPD (chronic obstructive pulmonary disease)     [place] or callus     Coronary artery disease     Depression     Esophagitis, erosive     followed by Dr. Graff    GERD (gastroesophageal reflux disease)     GI bleed     secondary to gastric ulcer - hospitalized -24    Gout     Headache     History of ASCVD (atherosclerotic cardiovascular disease)     History of blood transfusion     Spouse reported no reaction to transfuson     History of fracture 10/06/2021    Hx right pinky fracture - did require surgery    History of heart attack     Spouse reported \"2 light heart attacks at age 30\"     History of stomach ulcers     Hyperlipidemia     Hypertension     Migraine headache     Mild aortic valve sclerosis     Mild mitral insufficiency     Spouse reported MVP and that Dr Preciado said not sever enough to warrant surgery    On home oxygen therapy     Spouse reported at 2L/min NC prn    Peptic ulcer disease     chronic    Poor historian 10/06/2021    Snores     SOB (shortness of breath)     pt's mother reports at baseline 24    Tattoos     Vision problems     Weight loss     Recent Weight Loss Involuntary For Over A Year Or More - dates??       Past Surgical History:   Procedure Laterality Date    APPENDECTOMY      COLONOSCOPY N/A 04/10/2025    Procedure: Colonoscopy with polypectomy;  Surgeon: Leonor Graff MD;  Location: [facility] ENDOSCOPY;  Service: Gastroenterology;  Laterality: N/A;    " ENDOSCOPY N/A 11/05/2021    Procedure: ESOPHAGOGASTRODUODENOSCOPY WITH BIOPSY;  Surgeon: Leonor Graff MD;  Location: [facility] ENDOSCOPY;  Service: Gastroenterology;  Laterality: N/A;    ENDOSCOPY N/A 01/19/2023    Procedure: ESOPHAGOGASTRODUODENOSCOPY WITH BIOPSIES;  Surgeon: Leonor Graff MD;  Location: [facility] ENDOSCOPY;  Service: Gastroenterology;  Laterality: N/A;    ENDOSCOPY N/A 04/19/2024    Procedure: ESOPHAGOGASTRODUODENOSCOPY with biopsy;  Surgeon: Leonor Graff MD;  Location: [facility] ENDOSCOPY;  Service: Gastroenterology;  Laterality: N/A;    ENDOSCOPY N/A 11/20/2024    Procedure: Esophagogastroduodenscopy with biopsy;  Surgeon: Leonor Graff MD;  Location: [facility] ENDOSCOPY;  Service: Gastroenterology;  Laterality: N/A;    FRACTURE SURGERY Right     Pinky finger    WISDOM TOOTH EXTRACTION         Social History     Socioeconomic History    Marital status:    Tobacco Use    Smoking status: Every Day     Current packs/day: 0.50     Average packs/day: 0.5 packs/day for 34.3 years (17.2 ttl pk-yrs)     Types: Cigarettes     Start date: 1991     Passive exposure: Current    Smokeless tobacco: Current     Types: Snuff   Vaping Use    Vaping status: Former    Substances: Nicotine (Spouse reported no use in 2 years), Flavoring    Devices: Disposable   Substance and Sexual Activity    Alcohol use: Not Currently     Comment: social    Drug use: Not Currently     Comment: opiates, snort    Sexual activity: Defer       Family History   Problem Relation Age of Onset    Arthritis Mother     Thyroid disease Mother     Hypertension Mother     Migraines Sister     Thyroid disease Sister     Hypertension Sister     Mental illness Brother     Cancer Paternal Uncle     Arthritis Maternal Grandmother     Stroke Maternal Grandmother     Colon cancer Neg Hx     Cirrhosis Neg Hx     Liver cancer Neg Hx     Liver disease Neg Hx        Prior to Admission Medications:  No medications prior to  "admission.       Allergies:  Allergies   Allergen Reactions    Sulfa Antibiotics Other (See Comments)     \"causes his insides to burn and his skin gets really red\" reported by patient's spouse         Vitals: Temp:  [97.5 °F (36.4 °C)-97.7 °F (36.5 °C)] 97.7 °F (36.5 °C)  Heart Rate:  [87-92] 87  Resp:  [16-20] 20  BP: (111-126)/(76-82) 111/76    Review Of Systems:  Constitutional:  Negative for chills, fever, and unexpected weight change.  Respiratory:  Negative for cough, chest tightness, shortness of breath, and wheezing.  Cardiovascular:  Negative for chest pain, palpitations, and leg swelling.  Gastrointestinal:  Negative for abdominal distention, abdominal pain, nausea, vomiting.  Neurological:  Negative for weakness, numbness, and headaches.     Physical Exam:    General Appearance:  Alert, cooperative, in no acute distress.   Lungs:   Clear to auscultation, respirations regular, even and                 unlabored.   Heart:  Regular rhythm and normal rate.   Abdomen:   Normal bowel sounds, no masses, no organomegaly. Soft, nontender, nondistended   Neurologic: Alert and oriented x 3. Moves all four limbs equally       Assessment & Plan     Assessment:  Principal Problem:    Iron deficiency anemia due to chronic blood loss  Active Problems:    PUD (peptic ulcer disease)      Plan: Esophagogastroduodenoscopy with biopsy (N/A)     Leonor Graff MD  4/25/2025      "

## 2025-04-25 NOTE — ANESTHESIA POSTPROCEDURE EVALUATION
Patient: Americo Davis Jr.    Procedure Summary       Date: 04/25/25 Room / Location: [facility] ENDOSCOPY 2 / [facility] ENDOSCOPY    Anesthesia Start: 1133 Anesthesia Stop: 1149    Procedure: Esophagogastroduodenoscopy with biopsy (Esophagus) Diagnosis:       PUD (peptic ulcer disease)      Iron deficiency anemia due to chronic blood loss      (PUD (peptic ulcer disease) [K27.9])      (Iron deficiency anemia due to chronic blood loss [D50.0])    Surgeons: Leonor Graff MD Provider: Eduardo Moya CRNA    Anesthesia Type: MAC ASA Status: 3            Anesthesia Type: MAC    Vitals  HR 78  Sat 99  Resp 14  /72  Temp 98        Post Anesthesia Care and Evaluation    Patient location during evaluation: bedside  Patient participation: complete - patient participated  Level of consciousness: awake and alert and sleepy but conscious  Pain score: 0  Pain management: adequate    Airway patency: patent  Anesthetic complications: No anesthetic complications  PONV Status: none  Cardiovascular status: acceptable  Respiratory status: acceptable and nasal cannula  Hydration status: acceptable

## 2025-04-25 NOTE — DISCHARGE INSTRUCTIONS
- Discharge patient to home (ambulatory).   - Resume previous diet.   - Follow an antireflux regimen.   - Complete abstinence from Goody powder, smoking   - PPI daily  - Avoid other NSAIDS  - Await pathology results.   - Repeat upper endoscopy in 6 months for surveillance.   - Return to GI office in 8 weeks.      Rest today  No pushing,pulling,tugging,heavy lifting, or strenuous activity   No major decision making,driving,or drinking alcoholic beverages for 24 hours due to the sedation you received  Always use good hand hygiene/washing technique  No driving on pain medication.    To assist you in voiding:  Drink plenty of fluids  Listen to running water while attempting to void.    If you are unable to urinate and you have an uncomfortable urge to void or it has been   6 hours since you were discharged, return to the Emergency Room.    Stop taking Goody medication.

## 2025-04-28 LAB — REF LAB TEST METHOD: NORMAL

## 2025-05-24 ENCOUNTER — HOSPITAL ENCOUNTER (EMERGENCY)
Facility: HOSPITAL | Age: 50
Discharge: HOME | End: 2025-05-24
Attending: EMERGENCY MEDICINE
Payer: MEDICAID

## 2025-05-24 ENCOUNTER — APPOINTMENT (OUTPATIENT)
Dept: GENERAL RADIOLOGY | Facility: HOSPITAL | Age: 50
End: 2025-05-24
Payer: MEDICAID

## 2025-05-24 ENCOUNTER — APPOINTMENT (OUTPATIENT)
Dept: CT IMAGING | Facility: HOSPITAL | Age: 50
End: 2025-05-24
Payer: MEDICAID

## 2025-05-24 VITALS
RESPIRATION RATE: 20 BRPM | HEIGHT: 70 IN | SYSTOLIC BLOOD PRESSURE: 100 MMHG | HEART RATE: 97 BPM | BODY MASS INDEX: 20.04 KG/M2 | WEIGHT: 140 LBS | DIASTOLIC BLOOD PRESSURE: 67 MMHG | TEMPERATURE: 97.9 F | OXYGEN SATURATION: 100 %

## 2025-05-24 DIAGNOSIS — R25.1 TREMOR: Primary | ICD-10-CM

## 2025-05-24 DIAGNOSIS — J84.10 PULMONARY FIBROSIS: ICD-10-CM

## 2025-05-24 LAB
ALBUMIN SERPL-MCNC: 4.1 G/DL (ref 3.5–5.2)
ALBUMIN/GLOB SERPL: 1.1 G/DL
ALP SERPL-CCNC: 79 U/L (ref 39–117)
ALT SERPL W P-5'-P-CCNC: 8 U/L (ref 1–41)
AMPHET+METHAMPHET UR QL: NEGATIVE
AMPHETAMINES UR QL: NEGATIVE
ANION GAP SERPL CALCULATED.3IONS-SCNC: 12.3 MMOL/L (ref 5–15)
AST SERPL-CCNC: 16 U/L (ref 1–40)
B PARAPERT DNA SPEC QL NAA+PROBE: NOT DETECTED
B PERT DNA SPEC QL NAA+PROBE: NOT DETECTED
BARBITURATES UR QL SCN: NEGATIVE
BASOPHILS # BLD AUTO: 0.03 10*3/MM3 (ref 0–0.2)
BASOPHILS NFR BLD AUTO: 0.3 % (ref 0–1.5)
BENZODIAZ UR QL SCN: NEGATIVE
BILIRUB SERPL-MCNC: 0.2 MG/DL (ref 0–1.2)
BILIRUB UR QL STRIP: NEGATIVE
BUN SERPL-MCNC: 24 MG/DL (ref 6–20)
BUN/CREAT SERPL: 20.9 (ref 7–25)
BUPRENORPHINE SERPL-MCNC: POSITIVE NG/ML
C PNEUM DNA NPH QL NAA+NON-PROBE: NOT DETECTED
CALCIUM SPEC-SCNC: 10.1 MG/DL (ref 8.6–10.5)
CANNABINOIDS SERPL QL: NEGATIVE
CHLORIDE SERPL-SCNC: 98 MMOL/L (ref 98–107)
CK SERPL-CCNC: 120 U/L (ref 20–200)
CLARITY UR: CLEAR
CO2 SERPL-SCNC: 25.7 MMOL/L (ref 22–29)
COCAINE UR QL: NEGATIVE
COLOR UR: YELLOW
CREAT SERPL-MCNC: 1.15 MG/DL (ref 0.76–1.27)
DEPRECATED RDW RBC AUTO: 51 FL (ref 37–54)
EGFRCR SERPLBLD CKD-EPI 2021: 77.5 ML/MIN/1.73
EOSINOPHIL # BLD AUTO: 0.28 10*3/MM3 (ref 0–0.4)
EOSINOPHIL NFR BLD AUTO: 2.5 % (ref 0.3–6.2)
ERYTHROCYTE [DISTWIDTH] IN BLOOD BY AUTOMATED COUNT: 14.6 % (ref 12.3–15.4)
ETHANOL BLD-MCNC: <10 MG/DL (ref 0–10)
ETHANOL UR QL: <0.01 %
FENTANYL UR-MCNC: NEGATIVE NG/ML
FLUAV SUBTYP SPEC NAA+PROBE: NOT DETECTED
FLUBV RNA ISLT QL NAA+PROBE: NOT DETECTED
GLOBULIN UR ELPH-MCNC: 3.9 GM/DL
GLUCOSE SERPL-MCNC: 125 MG/DL (ref 65–99)
GLUCOSE UR STRIP-MCNC: NEGATIVE MG/DL
HADV DNA SPEC NAA+PROBE: NOT DETECTED
HCOV 229E RNA SPEC QL NAA+PROBE: NOT DETECTED
HCOV HKU1 RNA SPEC QL NAA+PROBE: NOT DETECTED
HCOV NL63 RNA SPEC QL NAA+PROBE: NOT DETECTED
HCOV OC43 RNA SPEC QL NAA+PROBE: NOT DETECTED
HCT VFR BLD AUTO: 39.1 % (ref 37.5–51)
HGB BLD-MCNC: 12.6 G/DL (ref 13–17.7)
HGB UR QL STRIP.AUTO: NEGATIVE
HMPV RNA NPH QL NAA+NON-PROBE: NOT DETECTED
HOLD SPECIMEN: NORMAL
HOLD SPECIMEN: NORMAL
HPIV1 RNA ISLT QL NAA+PROBE: NOT DETECTED
HPIV2 RNA SPEC QL NAA+PROBE: NOT DETECTED
HPIV3 RNA NPH QL NAA+PROBE: NOT DETECTED
HPIV4 P GENE NPH QL NAA+PROBE: NOT DETECTED
IMM GRANULOCYTES # BLD AUTO: 0.03 10*3/MM3 (ref 0–0.05)
IMM GRANULOCYTES NFR BLD AUTO: 0.3 % (ref 0–0.5)
KETONES UR QL STRIP: NEGATIVE
LEUKOCYTE ESTERASE UR QL STRIP.AUTO: NEGATIVE
LYMPHOCYTES # BLD AUTO: 2.78 10*3/MM3 (ref 0.7–3.1)
LYMPHOCYTES NFR BLD AUTO: 25.3 % (ref 19.6–45.3)
M PNEUMO IGG SER IA-ACNC: NOT DETECTED
MCH RBC QN AUTO: 30.9 PG (ref 26.6–33)
MCHC RBC AUTO-ENTMCNC: 32.2 G/DL (ref 31.5–35.7)
MCV RBC AUTO: 95.8 FL (ref 79–97)
METHADONE UR QL SCN: NEGATIVE
MONOCYTES # BLD AUTO: 0.58 10*3/MM3 (ref 0.1–0.9)
MONOCYTES NFR BLD AUTO: 5.3 % (ref 5–12)
NEUTROPHILS NFR BLD AUTO: 66.3 % (ref 42.7–76)
NEUTROPHILS NFR BLD AUTO: 7.3 10*3/MM3 (ref 1.7–7)
NITRITE UR QL STRIP: NEGATIVE
NRBC BLD AUTO-RTO: 0 /100 WBC (ref 0–0.2)
NT-PROBNP SERPL-MCNC: 311.7 PG/ML (ref 0–900)
OPIATES UR QL: NEGATIVE
OXYCODONE UR QL SCN: NEGATIVE
PCP UR QL SCN: NEGATIVE
PH UR STRIP.AUTO: 6 [PH] (ref 5–8)
PLATELET # BLD AUTO: 279 10*3/MM3 (ref 140–450)
PMV BLD AUTO: 9.4 FL (ref 6–12)
POTASSIUM SERPL-SCNC: 4.2 MMOL/L (ref 3.5–5.2)
PROT SERPL-MCNC: 8 G/DL (ref 6–8.5)
PROT UR QL STRIP: NEGATIVE
RBC # BLD AUTO: 4.08 10*6/MM3 (ref 4.14–5.8)
RHINOVIRUS RNA SPEC NAA+PROBE: NOT DETECTED
RSV RNA NPH QL NAA+NON-PROBE: NOT DETECTED
SARS-COV-2 RNA RESP QL NAA+PROBE: NOT DETECTED
SODIUM SERPL-SCNC: 136 MMOL/L (ref 136–145)
SP GR UR STRIP: 1.01 (ref 1–1.03)
TRICYCLICS UR QL SCN: NEGATIVE
UROBILINOGEN UR QL STRIP: NORMAL
WBC NRBC COR # BLD AUTO: 11 10*3/MM3 (ref 3.4–10.8)
WHOLE BLOOD HOLD COAG: NORMAL
WHOLE BLOOD HOLD SPECIMEN: NORMAL

## 2025-05-24 PROCEDURE — 82550 ASSAY OF CK (CPK): CPT | Performed by: EMERGENCY MEDICINE

## 2025-05-24 PROCEDURE — 25010000002 METHYLPREDNISOLONE PER 125 MG: Performed by: EMERGENCY MEDICINE

## 2025-05-24 PROCEDURE — 80053 COMPREHEN METABOLIC PANEL: CPT | Performed by: EMERGENCY MEDICINE

## 2025-05-24 PROCEDURE — 71275 CT ANGIOGRAPHY CHEST: CPT

## 2025-05-24 PROCEDURE — 71045 X-RAY EXAM CHEST 1 VIEW: CPT

## 2025-05-24 PROCEDURE — 25510000001 IOPAMIDOL 61 % SOLUTION: Performed by: EMERGENCY MEDICINE

## 2025-05-24 PROCEDURE — 0202U NFCT DS 22 TRGT SARS-COV-2: CPT | Performed by: EMERGENCY MEDICINE

## 2025-05-24 PROCEDURE — 70450 CT HEAD/BRAIN W/O DYE: CPT

## 2025-05-24 PROCEDURE — 25810000003 SODIUM CHLORIDE 0.9 % SOLUTION: Performed by: EMERGENCY MEDICINE

## 2025-05-24 PROCEDURE — 99285 EMERGENCY DEPT VISIT HI MDM: CPT | Performed by: EMERGENCY MEDICINE

## 2025-05-24 PROCEDURE — 85025 COMPLETE CBC W/AUTO DIFF WBC: CPT | Performed by: EMERGENCY MEDICINE

## 2025-05-24 PROCEDURE — 96374 THER/PROPH/DIAG INJ IV PUSH: CPT

## 2025-05-24 PROCEDURE — 82077 ASSAY SPEC XCP UR&BREATH IA: CPT | Performed by: EMERGENCY MEDICINE

## 2025-05-24 PROCEDURE — 81003 URINALYSIS AUTO W/O SCOPE: CPT | Performed by: EMERGENCY MEDICINE

## 2025-05-24 PROCEDURE — 83880 ASSAY OF NATRIURETIC PEPTIDE: CPT | Performed by: EMERGENCY MEDICINE

## 2025-05-24 PROCEDURE — 80307 DRUG TEST PRSMV CHEM ANLYZR: CPT | Performed by: EMERGENCY MEDICINE

## 2025-05-24 RX ORDER — IOPAMIDOL 612 MG/ML
100 INJECTION, SOLUTION INTRAVASCULAR
Status: COMPLETED | OUTPATIENT
Start: 2025-05-24 | End: 2025-05-24

## 2025-05-24 RX ORDER — SODIUM CHLORIDE 0.9 % (FLUSH) 0.9 %
10 SYRINGE (ML) INJECTION AS NEEDED
Status: DISCONTINUED | OUTPATIENT
Start: 2025-05-24 | End: 2025-05-24 | Stop reason: HOSPADM

## 2025-05-24 RX ORDER — FERROUS SULFATE 325(65) MG
1 TABLET ORAL DAILY
COMMUNITY
Start: 2025-04-24

## 2025-05-24 RX ORDER — LANOLIN ALCOHOL/MO/W.PET/CERES
CREAM (GRAM) TOPICAL
COMMUNITY
Start: 2025-05-23

## 2025-05-24 RX ORDER — ASCORBIC ACID 500 MG
1 TABLET ORAL DAILY
COMMUNITY
Start: 2025-04-24

## 2025-05-24 RX ORDER — IPRATROPIUM BROMIDE AND ALBUTEROL SULFATE 2.5; .5 MG/3ML; MG/3ML
3 SOLUTION RESPIRATORY (INHALATION) ONCE
Status: COMPLETED | OUTPATIENT
Start: 2025-05-24 | End: 2025-05-24

## 2025-05-24 RX ORDER — METHYLPREDNISOLONE SODIUM SUCCINATE 125 MG/2ML
125 INJECTION, POWDER, LYOPHILIZED, FOR SOLUTION INTRAMUSCULAR; INTRAVENOUS ONCE
Status: COMPLETED | OUTPATIENT
Start: 2025-05-24 | End: 2025-05-24

## 2025-05-24 RX ORDER — DOCUSATE SODIUM 100 MG/1
100 CAPSULE, LIQUID FILLED ORAL EVERY EVENING
COMMUNITY
Start: 2025-05-22

## 2025-05-24 RX ADMIN — METHYLPREDNISOLONE SODIUM SUCCINATE 125 MG: 125 INJECTION, POWDER, FOR SOLUTION INTRAMUSCULAR; INTRAVENOUS at 13:09

## 2025-05-24 RX ADMIN — IPRATROPIUM BROMIDE AND ALBUTEROL SULFATE 3 ML: 2.5; .5 SOLUTION RESPIRATORY (INHALATION) at 13:09

## 2025-05-24 RX ADMIN — SODIUM CHLORIDE 1000 ML: 9 INJECTION, SOLUTION INTRAVENOUS at 13:09

## 2025-05-24 RX ADMIN — IOPAMIDOL 80 ML: 612 INJECTION, SOLUTION INTRAVENOUS at 15:24

## 2025-05-24 NOTE — ED PROVIDER NOTES
Subjective   History of Present Illness  49-year-old male who presents for evaluation of several symptoms that evolved over the last couple days.  Reportedly at roughly 5 PM yesterday the patient felt like he was too weak to stand therefore he got a electric mobile chair to get around at [facility].  However he states that this is not unusual and this occurs intermittently.  He states that last night he was tremulous and dropping things out of his hands and his oxygen saturations were noted to be low.  He is supposed to wear oxygen at night, but does not typically wear it throughout the day.  Family members were concerned that he may be having seizures, the patient is adamant that he did not have a seizure last night.  EMS was called to his residence last night but he refused to present for further evaluation.  He was advised to wear his oxygen throughout the night and reassess the symptoms in the morning.  He reportedly continues to be tremulous fatigue and slightly altered this morning.  The family insisted that he come in for evaluation.  The patient himself continues to downplay everything and does not think anything significant is going on.  He truly just wants to go home but he is willing to stay for workup at this time.  He denies fever.  No definitive sick contacts.  No history of DVT or PE.  His fluid intake has been good.  Denies any illicit drug use but does take Suboxone on a daily basis which is scheduled to him.  He denies missing any dose of the Suboxone.  He denies alcohol intake.  He has no focal chest or abdominal pain or change in bowel or urinary function.  No other acute complaints.      Review of Systems   Constitutional:  Negative for chills, fatigue and fever.   HENT:  Negative for congestion, ear pain, postnasal drip, sinus pressure and sore throat.    Eyes:  Negative for pain, redness and visual disturbance.   Respiratory:  Negative for cough, chest tightness and shortness of breath.   "  Cardiovascular:  Negative for chest pain, palpitations and leg swelling.   Gastrointestinal:  Negative for abdominal pain, anal bleeding, blood in stool, diarrhea, nausea and vomiting.   Endocrine: Negative for polydipsia and polyuria.   Genitourinary:  Negative for difficulty urinating, dysuria, frequency and urgency.   Musculoskeletal:  Negative for arthralgias, back pain and neck pain.   Skin:  Negative for pallor and rash.   Allergic/Immunologic: Negative for environmental allergies and immunocompromised state.   Neurological:  Positive for tremors and weakness. Negative for dizziness and headaches.   Hematological:  Negative for adenopathy.   Psychiatric/Behavioral:  Positive for confusion. Negative for self-injury and suicidal ideas. The patient is not nervous/anxious.    All other systems reviewed and are negative.      Past Medical History:   Diagnosis Date    Anemia     Anxiety     Asthma     Back pain     Bleeding ulcer 04/2024    Body piercing 10/06/2021    ears    CHF (congestive heart failure)     Dr. Preciado LOV 2023    Chronic venous insufficiency     Colitis     Constipation     COPD (chronic obstructive pulmonary disease)     [place] or callus     Coronary artery disease     Depression     Esophagitis, erosive     followed by Dr. Graff    GERD (gastroesophageal reflux disease)     GI bleed     secondary to gastric ulcer - hospitalized 4/18-4/20/24    Gout     Headache     History of ASCVD (atherosclerotic cardiovascular disease)     History of blood transfusion     Spouse reported no reaction to transfuson     History of fracture 10/06/2021    Hx right pinky fracture - did require surgery    History of heart attack     Spouse reported \"2 light heart attacks at age 30\"     History of stomach ulcers     Hyperlipidemia     Hypertension     Migraine headache     Mild aortic valve sclerosis     Mild mitral insufficiency     Spouse reported MVP and that Dr Preciado said not sever enough to warrant surgery " "   On home oxygen therapy     Spouse reported at 2L/min NC prn    Peptic ulcer disease     chronic    Poor historian 10/06/2021    Snores     SOB (shortness of breath)     pt's mother reports at baseline 4/4/25    Tattoos     Vision problems     Weight loss     Recent Weight Loss Involuntary For Over A Year Or More - dates??       Allergies   Allergen Reactions    Sulfa Antibiotics Other (See Comments)     \"causes his insides to burn and his skin gets really red\" reported by patient's spouse        Past Surgical History:   Procedure Laterality Date    APPENDECTOMY      COLONOSCOPY N/A 04/10/2025    Procedure: Colonoscopy with polypectomy;  Surgeon: Leonor Graff MD;  Location: [facility] ENDOSCOPY;  Service: Gastroenterology;  Laterality: N/A;    ENDOSCOPY N/A 11/05/2021    Procedure: ESOPHAGOGASTRODUODENOSCOPY WITH BIOPSY;  Surgeon: Leonor Graff MD;  Location: [facility] ENDOSCOPY;  Service: Gastroenterology;  Laterality: N/A;    ENDOSCOPY N/A 01/19/2023    Procedure: ESOPHAGOGASTRODUODENOSCOPY WITH BIOPSIES;  Surgeon: Leonor Graff MD;  Location: [facility] ENDOSCOPY;  Service: Gastroenterology;  Laterality: N/A;    ENDOSCOPY N/A 04/19/2024    Procedure: ESOPHAGOGASTRODUODENOSCOPY with biopsy;  Surgeon: Leonor Graff MD;  Location: [facility] ENDOSCOPY;  Service: Gastroenterology;  Laterality: N/A;    ENDOSCOPY N/A 11/20/2024    Procedure: Esophagogastroduodenscopy with biopsy;  Surgeon: Leonor Graff MD;  Location: [facility] ENDOSCOPY;  Service: Gastroenterology;  Laterality: N/A;    ENDOSCOPY N/A 4/25/2025    Procedure: Esophagogastroduodenoscopy with biopsy;  Surgeon: Leonor Graff MD;  Location: [facility] ENDOSCOPY;  Service: Gastroenterology;  Laterality: N/A;    FRACTURE SURGERY Right     Pinky finger    WISDOM TOOTH EXTRACTION         Family History   Problem Relation Age of Onset    Arthritis Mother     Thyroid disease Mother     Hypertension Mother     Migraines Sister     " Thyroid disease Sister     Hypertension Sister     Mental illness Brother     Cancer Paternal Uncle     Arthritis Maternal Grandmother     Stroke Maternal Grandmother     Colon cancer Neg Hx     Cirrhosis Neg Hx     Liver cancer Neg Hx     Liver disease Neg Hx        Social History     Socioeconomic History    Marital status:    Tobacco Use    Smoking status: Every Day     Current packs/day: 1.50     Average packs/day: 0.5 packs/day for 34.4 years (18.2 ttl pk-yrs)     Types: Cigarettes     Start date: 1991     Passive exposure: Current    Smokeless tobacco: Current     Types: Snuff   Vaping Use    Vaping status: Former    Substances: Nicotine (Spouse reported no use in 2 years), Flavoring    Devices: Disposable   Substance and Sexual Activity    Alcohol use: Not Currently     Comment: social    Drug use: Not Currently     Comment: opiates, snort    Sexual activity: Defer           Objective   Physical Exam  Vitals and nursing note reviewed.   Constitutional:       General: He is not in acute distress.     Appearance: Normal appearance. He is well-developed. He is not toxic-appearing or diaphoretic.   HENT:      Head: Normocephalic and atraumatic.      Right Ear: External ear normal.      Left Ear: External ear normal.      Nose: Nose normal.   Eyes:      General: Lids are normal.      Pupils: Pupils are equal, round, and reactive to light.   Neck:      Trachea: No tracheal deviation.   Cardiovascular:      Rate and Rhythm: Regular rhythm. Tachycardia present.      Pulses: No decreased pulses.      Heart sounds: Normal heart sounds. No murmur heard.     No friction rub. No gallop.   Pulmonary:      Effort: Pulmonary effort is normal. No respiratory distress.      Breath sounds: Normal breath sounds. No decreased breath sounds, wheezing, rhonchi or rales.   Abdominal:      General: Bowel sounds are normal.      Palpations: Abdomen is soft.      Tenderness: There is no abdominal tenderness. There is no guarding  or rebound.   Musculoskeletal:         General: No deformity. Normal range of motion.      Cervical back: Normal range of motion and neck supple.   Lymphadenopathy:      Cervical: No cervical adenopathy.   Skin:     General: Skin is warm and dry.      Findings: No rash.   Neurological:      Mental Status: He is alert and oriented to person, place, and time.      Cranial Nerves: No cranial nerve deficit.      Sensory: No sensory deficit.   Psychiatric:         Speech: Speech normal.         Behavior: Behavior normal.         Thought Content: Thought content normal.         Judgment: Judgment normal.         Procedures           ED Course  ED Course as of 05/24/25 1632   Sat May 24, 2025   1324 EKG performed 5/24/2025 at 12:08 PM interpreted by me shows sinus rhythm, normal QRS, normal QTc, no acute ischemic changes. [NS]      ED Course User Index  [NS] Shakir Bray MD                                                       Medical Decision Making  Differential includes COPD exacerbation, pneumonia, viral illness, seizure, essential tremor, rhabdomyolysis, renal insufficiency, electrolyte abnormality, other unspecified etiology.    Viral respiratory panel is negative.  Urine does not show infection.  CK is normal.  Alcohol level is negative, BNP is normal.    CT scan head without contrast interpreted by me is negative for intracranial hemorrhage or mass.    Chest x-ray interpreted by me shows normal heart size and clear lungs.    CT angiogram the chest inter by me is negative for pulmonary embolism but does show lower lung changes which are reported to be consistent with pulmonary fibrosis per radiology.    The patient had a tremor therefore obtaining an accurate oxygen saturation was difficult.  Some low oxygen saturations were recorded into the 80s but I feel these are inaccurate.  Once the pulse oximeter was applied to the patient's ear and the tremor therefore was eliminated the patient's oxygen saturations  were 100% even while sleeping.    He strongly desires to go home.  He exhibits normal mentation and is able to answer questions appropriately.    He will be discharged with referral to both pulmonology and neurology for outpatient follow-up.    Advised to return to the ER with any further concern.    Problems Addressed:  Pulmonary fibrosis: complicated acute illness or injury with systemic symptoms  Tremor: complicated acute illness or injury with systemic symptoms    Amount and/or Complexity of Data Reviewed  Independent Historian: friend     Details: Mother helps provide additional history.  External Data Reviewed: labs, radiology and ECG.  Labs: ordered. Decision-making details documented in ED Course.  Radiology: ordered and independent interpretation performed. Decision-making details documented in ED Course.  ECG/medicine tests: ordered and independent interpretation performed. Decision-making details documented in ED Course.    Risk  Prescription drug management.        Final diagnoses:   Tremor   Pulmonary fibrosis       ED Disposition  ED Disposition       ED Disposition   Discharge    Condition   Stable    Comment   --               Josephine Conroy, APRN  [address] [facility] [address]  [phone]    In 1 week      [facility] NEUROLOGY PROVIDER  [address] [place] [id number] [phone]  Schedule an appointment as soon as possible for a visit       [facility] PULMONARY LAB  [address] [facility] [address] [phone]  Schedule an appointment as soon as possible for a visit            Medication List      No changes were made to your prescriptions during this visit.            Shakir Bray MD  05/24/25 0600

## 2025-05-24 NOTE — Clinical Note
[facility] EMERGENCY DEPARTMENT  [address] [facility] [address]  Phone: [phone]    Americo Davis was seen and treated in our emergency department on 5/24/2025.  He may return to work on 05/26/2025.         Thank you for choosing [facility].    Shakir Bray MD

## 2025-05-24 NOTE — DISCHARGE INSTRUCTIONS
Wear your oxygen as needed at home.    Follow-up with pulmonology for further Evaluation of pulmonary fibrosis identified on CT scan.    Follow-up with neurology for further outpatient valuation of current tremor.    Make sure to drink an appropriate amount of fluids.    Return to the ER with any further concern.

## 2025-06-09 ENCOUNTER — LAB (OUTPATIENT)
Dept: LAB | Facility: HOSPITAL | Age: 50
End: 2025-06-09
Payer: MEDICAID

## 2025-06-09 ENCOUNTER — OFFICE VISIT (OUTPATIENT)
Dept: PULMONOLOGY | Facility: CLINIC | Age: 50
End: 2025-06-09
Payer: MEDICAID

## 2025-06-09 VITALS
DIASTOLIC BLOOD PRESSURE: 56 MMHG | HEART RATE: 62 BPM | SYSTOLIC BLOOD PRESSURE: 102 MMHG | BODY MASS INDEX: 21.05 KG/M2 | WEIGHT: 147 LBS | OXYGEN SATURATION: 96 % | HEIGHT: 70 IN

## 2025-06-09 DIAGNOSIS — D50.0 ANEMIA, BLOOD LOSS: ICD-10-CM

## 2025-06-09 DIAGNOSIS — J84.9 ILD (INTERSTITIAL LUNG DISEASE): ICD-10-CM

## 2025-06-09 DIAGNOSIS — R06.02 SHORTNESS OF BREATH: Primary | ICD-10-CM

## 2025-06-09 DIAGNOSIS — M25.50 ARTHRALGIA, UNSPECIFIED JOINT: ICD-10-CM

## 2025-06-09 DIAGNOSIS — J43.9 PULMONARY EMPHYSEMA, UNSPECIFIED EMPHYSEMA TYPE: ICD-10-CM

## 2025-06-09 DIAGNOSIS — J84.10 PULMONARY FIBROSIS: ICD-10-CM

## 2025-06-09 DIAGNOSIS — F17.210 NICOTINE DEPENDENCE, CIGARETTES, UNCOMPLICATED: ICD-10-CM

## 2025-06-09 PROCEDURE — 86235 NUCLEAR ANTIGEN ANTIBODY: CPT

## 2025-06-09 PROCEDURE — 86431 RHEUMATOID FACTOR QUANT: CPT

## 2025-06-09 PROCEDURE — 86038 ANTINUCLEAR ANTIBODIES: CPT

## 2025-06-09 PROCEDURE — 36415 COLL VENOUS BLD VENIPUNCTURE: CPT

## 2025-06-09 PROCEDURE — 80053 COMPREHEN METABOLIC PANEL: CPT

## 2025-06-09 PROCEDURE — 86200 CCP ANTIBODY: CPT

## 2025-06-09 RX ORDER — ALBUTEROL SULFATE 90 UG/1
2 INHALANT RESPIRATORY (INHALATION) EVERY 4 HOURS PRN
Qty: 6.7 G | Refills: 5 | Status: SHIPPED | OUTPATIENT
Start: 2025-06-09

## 2025-06-09 NOTE — PROGRESS NOTES
Follow Up Office Visit      Patient Name: Americo Davis Jr.    Chief Complaint:    Chief Complaint   Patient presents with    Breathing Problem       History of Present Illness: Americo Davis Jr. is a 49 y.o. male with emphysema who is here today for follow up after chest imaging during recent ED visit revealed pulmonary fibrosis. He was last seen in clinic in September 2022.  He notes that he does not feel like his baseline dyspnea has been worsening since that time.  He notes that he has been out of inhalers for about 1 year now.  He continues to smoke.  He does note that he has diffuse joint pain.    Severity: Moderate dyspnea  Timing: Daily  Context: Walking a short distance to his mailbox and back  Associated Symptoms: Chronic cough productive of phlegm, occasional wheezing, no fevers, no hemoptysis, no unintended weight loss  Modifying Factors: Dyspnea is worse with exertion, improves with rest.  Supplemental Oxygen: 2L qhs & PRN, DME is [employer]    Subjective      Review of Systems:  Review of Systems   Constitutional:  Negative for fever and unexpected weight change.   Respiratory:  Positive for cough and shortness of breath. Negative for wheezing.    Cardiovascular:  Negative for chest pain and leg swelling.   Musculoskeletal:  Positive for arthralgias.        Past Medical History:   Past Medical History:   Diagnosis Date    Anemia     Anxiety     Asthma     Back pain     Bleeding ulcer 04/2024    Body piercing 10/06/2021    ears    CHF (congestive heart failure)     Dr. Ilana DAVE 2023    Chronic venous insufficiency     Colitis     Constipation     COPD (chronic obstructive pulmonary disease)     Harrold or callus     Coronary artery disease     Depression     Esophagitis, erosive     followed by Dr. Graff    GERD (gastroesophageal reflux disease)     GI bleed     secondary to gastric ulcer - hospitalized 4/18-4/20/24    Gout     Headache     History of ASCVD (atherosclerotic cardiovascular  "disease)     History of blood transfusion     Spouse reported no reaction to transfuson     History of fracture 10/06/2021    Hx right pinky fracture - did require surgery    History of heart attack     Spouse reported \"2 light heart attacks at age 30\"     History of stomach ulcers     Hyperlipidemia     Hypertension     Migraine headache     Mild aortic valve sclerosis     Mild mitral insufficiency     Spouse reported MVP and that Dr Preciado said not sever enough to warrant surgery    On home oxygen therapy     Spouse reported at 2L/min NC prn    Peptic ulcer disease     chronic    Poor historian 10/06/2021    Snores     SOB (shortness of breath)     pt's mother reports at baseline 4/4/25    Tattoos     Vision problems     Weight loss     Recent Weight Loss Involuntary For Over A Year Or More - dates??       Past Surgical History:   Past Surgical History:   Procedure Laterality Date    APPENDECTOMY      COLONOSCOPY N/A 04/10/2025    Procedure: Colonoscopy with polypectomy;  Surgeon: Leonor Graff MD;  Location: [facility] ENDOSCOPY;  Service: Gastroenterology;  Laterality: N/A;    ENDOSCOPY N/A 11/05/2021    Procedure: ESOPHAGOGASTRODUODENOSCOPY WITH BIOPSY;  Surgeon: Leonor Graff MD;  Location: [facility] ENDOSCOPY;  Service: Gastroenterology;  Laterality: N/A;    ENDOSCOPY N/A 01/19/2023    Procedure: ESOPHAGOGASTRODUODENOSCOPY WITH BIOPSIES;  Surgeon: Leonor Graff MD;  Location: [facility] ENDOSCOPY;  Service: Gastroenterology;  Laterality: N/A;    ENDOSCOPY N/A 04/19/2024    Procedure: ESOPHAGOGASTRODUODENOSCOPY with biopsy;  Surgeon: Leonor Graff MD;  Location: [facility] ENDOSCOPY;  Service: Gastroenterology;  Laterality: N/A;    ENDOSCOPY N/A 11/20/2024    Procedure: Esophagogastroduodenscopy with biopsy;  Surgeon: Leonor Graff MD;  Location: [facility] ENDOSCOPY;  Service: Gastroenterology;  Laterality: N/A;    ENDOSCOPY N/A 4/25/2025    Procedure: Esophagogastroduodenoscopy with " biopsy;  Surgeon: Leonor Graff MD;  Location: [facility] ENDOSCOPY;  Service: Gastroenterology;  Laterality: N/A;    FRACTURE SURGERY Right     Pinky finger    WISDOM TOOTH EXTRACTION         Family History:   Family History   Problem Relation Age of Onset    Arthritis Mother     Thyroid disease Mother     Hypertension Mother     Migraines Sister     Thyroid disease Sister     Hypertension Sister     Mental illness Brother     Cancer Paternal Uncle     Arthritis Maternal Grandmother     Stroke Maternal Grandmother     Colon cancer Neg Hx     Cirrhosis Neg Hx     Liver cancer Neg Hx     Liver disease Neg Hx        Social History:   Social History     Socioeconomic History    Marital status:    Tobacco Use    Smoking status: Every Day     Current packs/day: 1.50     Average packs/day: 0.5 packs/day for 34.4 years (18.3 ttl pk-yrs)     Types: Cigarettes     Start date: 1991     Passive exposure: Current    Smokeless tobacco: Current     Types: Snuff   Vaping Use    Vaping status: Former    Substances: Nicotine (Spouse reported no use in 2 years), Flavoring    Devices: Disposable   Substance and Sexual Activity    Alcohol use: Not Currently     Comment: social    Drug use: Not Currently     Comment: opiates, snort    Sexual activity: Defer       Current Medications:     Current Outpatient Medications:     ascorbic acid (VITAMIN C) 500 MG tablet, Take 1 tablet by mouth Daily., Disp: , Rfl:     buprenorphine-naloxone (SUBOXONE) 8-2 MG per SL tablet, Place 2 tablets under the tongue Daily., Disp: , Rfl:     docusate sodium (COLACE) 100 MG capsule, Take 1 capsule by mouth Every Evening., Disp: , Rfl:     famotidine (PEPCID) 40 MG tablet, Take 1 tablet by mouth Every Night., Disp: 90 tablet, Rfl: 3    FeroSul 325 (65 Fe) MG tablet, Take 1 tablet by mouth Daily., Disp: , Rfl:     ferrous gluconate (FERGON) 324 MG tablet, Take 1 tablet by mouth Daily With Breakfast., Disp: 90 tablet, Rfl: 3    folic acid (FOLVITE)  "1 MG tablet, Take 1 tablet by mouth Daily., Disp: , Rfl:     hydroCHLOROthiazide 12.5 MG tablet, Take 1 tablet by mouth Daily., Disp: , Rfl:     losartan (COZAAR) 50 MG tablet, Take 1 tablet by mouth Daily., Disp: , Rfl:     metoprolol succinate XL (TOPROL-XL) 25 MG 24 hr tablet, Take 1 tablet by mouth Daily., Disp: , Rfl:     naloxone (NARCAN) 4 MG/0.1ML nasal spray, 1 spray into the nostril(s) as directed by provider As Needed (.). use for oversedation and call 911, Disp: 1 each, Rfl: 2    ondansetron (Zofran) 4 MG tablet, Take 1 tablet by mouth Every 8 (Eight) Hours As Needed for Nausea., Disp: 30 tablet, Rfl: 1    ondansetron ODT (ZOFRAN-ODT) 4 MG disintegrating tablet, Place 1 tablet on the tongue Every 8 (Eight) Hours As Needed for Nausea or Vomiting., Disp: 20 tablet, Rfl: 0    pantoprazole (PROTONIX) 40 MG EC tablet, Take 1 tablet by mouth Daily. Taking 2 per day, Disp: 90 tablet, Rfl: 3    PARoxetine (PAXIL) 20 MG tablet, Take 1 tablet by mouth Daily., Disp: , Rfl:     polyethylene glycol (MIRALAX) 17 g packet, Take 17 g by mouth Daily., Disp: 30 packet, Rfl: 0    senna 8.6 MG tablet, Take 1 tablet by mouth Daily., Disp: 14 tablet, Rfl: 0    vitamin B-12 (CYANOCOBALAMIN) 1000 MCG tablet, , Disp: , Rfl:      Allergies:   Allergies   Allergen Reactions    Sulfa Antibiotics Other (See Comments)     \"causes his insides to burn and his skin gets really red\" reported by patient's spouse        Objective     Physical Exam:  Vital Signs:   Vitals:    06/09/25 1442   BP: 102/56   Pulse: 62   SpO2: 96%   Weight: 66.7 kg (147 lb)   Height: 177.8 cm (70\")     Body mass index is 21.09 kg/m².  ============================  ============================    6 MINUTE WALK TEST    Americo Davis Jr.   1975             BASELINE   SpO2%: 96 % RA    Heart Rate 62   Blood Pressure 102/56     EXERCISE SpO2% HEART RATE RA or O2 @ LPM   1 MINUTE 96 50 RA   2 MINUTES 96 50 RA   3 MINUTES 94 100 RA   4 MINUTES 94 102 RA   5 " MINUTES 94 102 RA   6 MINUTES 92 103 RA   (Number of laps: 7 X 36 meters + Final partial lap: 0 meters = 252 meters)            Distance Walked:  252 Meters   SpO2% Post Exercise:  99 %   HR Post Exercise:  81     Reason to stop (if applicable):   ____ Chest Pain   ____ Light Headedness   ____ Dyspnea Unrelieved by Rest   ____ Abnormal Gait Pattern   ____ Severe Fatigue   ____ Other (Specify: __________________________)    Tech Comments (if any): NA     Test performed by: BB    ============================  ============================      Physical Exam  Vitals reviewed.   Constitutional:       General: He is not in acute distress.     Appearance: He is not toxic-appearing.   HENT:      Head: Normocephalic and atraumatic.      Mouth/Throat:      Mouth: Mucous membranes are moist.   Eyes:      Extraocular Movements: Extraocular movements intact.      Conjunctiva/sclera: Conjunctivae normal.   Cardiovascular:      Rate and Rhythm: Normal rate.      Heart sounds: Normal heart sounds.   Pulmonary:      Effort: Pulmonary effort is normal.      Breath sounds: Normal breath sounds.   Abdominal:      General: There is no distension.      Palpations: Abdomen is soft.   Musculoskeletal:         General: No swelling.      Cervical back: Neck supple.   Skin:     General: Skin is warm and dry.      Findings: No rash.   Neurological:      General: No focal deficit present.      Mental Status: He is alert and oriented to person, place, and time.   Psychiatric:         Mood and Affect: Mood normal.         Behavior: Behavior normal.       Results Review:   May 2025 CTA chest showed no evidence of thoracic aortic aneurysm, dissection or pulmonary embolism.  Evidence of pulmonary fibrosis, noted to represent a significant progression compared to prior exam.    September 2022 PFT showed no obstruction without significant bronchodilator response, postbronchodilator FEV1 of 91%.  No restriction, + air trapping suggested, normal  diffusion capacity.    March 2022 high-resolution chest CT scan showed centrilobular emphysema with no significant interlobular septal thickening or honeycombing.    Assessment / Plan      Assessment/Plan:   Diagnoses and all orders for this visit:    1. Shortness of breath (Primary)  -     6 Minute Walk Test; Future  Currently appears to be at his baseline, which he says has not worsened over the past 2 years.  No need for supplemental O2 during walk test performed today.    2. Pulmonary emphysema, unspecified emphysema type  -     Fluticasone-Umeclidin-Vilant (TRELEGY) 100-62.5-25 MCG/ACT inhaler; Inhale 1 puff Daily. Rinse mouth out after use  Dispense: 1 each; Refill: 5  -     albuterol sulfate  (90 Base) MCG/ACT inhaler; Inhale 2 puffs Every 4 (Four) Hours As Needed for Wheezing or Shortness of Air (Cough).  Dispense: 6.7 g; Refill: 5  -     Complete PFT - Pre & Post Bronchodilator; Future  Begin inhaled regimen as noted above. We discussed the risk and benefits of inhaled corticosteroids. Patient instructed to take them on a regular basis as prescribed. Patient instructed to rinse their mouth out after each use. Appropriate inhaler technique demonstrated today in clinic. Patient instructed to contact their insurance company and make sure that the medications prescribed are on their formulary and the lowest cost/tier for them. They will call the clinic back if different medications need to prescribed. Patient was encouraged to maintain as much physical activity as he can safely tolerate.    3. Pulmonary fibrosis  -     Complete PFT - Pre & Post Bronchodilator; Future  Noted on recent chest imaging.  No restriction on prior PFTs.  Check repeat PFTs prior to next clinic visit.    4. Arthralgia, unspecified joint  -     ELISABETH by IFA, Reflex to Titer and Pattern; Future  -     Rheumatoid Factor; Future  -     Sjogren's Antibody, Anti-SS-A / -SS-B; Future  -     Anti-Glenda 1 Antibody, IgG; Future  -      Anti-scleroderma Antibody; Future  -     Cyclic Citrul Peptide Antibody, IgG / IgA; Future  Will plan to refer to rheumatology if any lab results return abnormal.    5. ILD (interstitial lung disease)  -     ELISABETH by IFA, Reflex to Titer and Pattern; Future  -     Rheumatoid Factor; Future  -     Sjogren's Antibody, Anti-SS-A / -SS-B; Future  -     Anti-Glenda 1 Antibody, IgG; Future  -     Anti-scleroderma Antibody; Future  -     Cyclic Citrul Peptide Antibody, IgG / IgA; Future  May be secondary to history of illicit substance abuse per review of his social history.  Check labs to assess for any underlying conditions that may be contributing.  Can consider a repeat  high-resolution chest CT scan in the future.    6. Nicotine dependence, cigarettes, uncomplicated  Complete cessation is advised. The patient was educated on the risk of fire/explosion and associated harm to self or others if open flame, including that from cigarettes if near a supplemental oxygen source. Patient voiced understanding.         Follow Up:   Return in about 3 months (around 9/9/2025) for Recheck.  The patient was counseled on diagnostic results, risks and benefits of treatment options, risk factor modifications and the importance of treatment compliance. The patient was advised to contact the clinic with concerns or worsening symptoms.     DENNY Hernandez   [facility] [place]     This document has been electronically signed by DENNY Hernandez  June 9, 2025

## 2025-06-10 LAB
ALBUMIN SERPL-MCNC: 4.3 G/DL (ref 3.5–5.2)
ALBUMIN/GLOB SERPL: 1.2 G/DL
ALP SERPL-CCNC: 81 U/L (ref 39–117)
ALT SERPL W P-5'-P-CCNC: 9 U/L (ref 1–41)
ANION GAP SERPL CALCULATED.3IONS-SCNC: 13 MMOL/L (ref 5–15)
AST SERPL-CCNC: 21 U/L (ref 1–40)
BILIRUB SERPL-MCNC: <0.2 MG/DL (ref 0–1.2)
BUN SERPL-MCNC: 22 MG/DL (ref 6–20)
BUN/CREAT SERPL: 16.9 (ref 7–25)
CALCIUM SPEC-SCNC: 9.7 MG/DL (ref 8.6–10.5)
CHLORIDE SERPL-SCNC: 96 MMOL/L (ref 98–107)
CHROMATIN AB SERPL-ACNC: <10 IU/ML (ref 0–14)
CO2 SERPL-SCNC: 25 MMOL/L (ref 22–29)
CREAT SERPL-MCNC: 1.3 MG/DL (ref 0.76–1.27)
EGFRCR SERPLBLD CKD-EPI 2021: 66.9 ML/MIN/1.73
GLOBULIN UR ELPH-MCNC: 3.5 GM/DL
GLUCOSE SERPL-MCNC: 91 MG/DL (ref 65–99)
POTASSIUM SERPL-SCNC: 4.5 MMOL/L (ref 3.5–5.2)
PROT SERPL-MCNC: 7.8 G/DL (ref 6–8.5)
SODIUM SERPL-SCNC: 134 MMOL/L (ref 136–145)

## 2025-06-11 ENCOUNTER — OFFICE VISIT (OUTPATIENT)
Dept: NEUROLOGY | Facility: CLINIC | Age: 50
End: 2025-06-11
Payer: MEDICAID

## 2025-06-11 VITALS
DIASTOLIC BLOOD PRESSURE: 60 MMHG | HEIGHT: 70 IN | WEIGHT: 148 LBS | OXYGEN SATURATION: 96 % | HEART RATE: 80 BPM | BODY MASS INDEX: 21.19 KG/M2 | SYSTOLIC BLOOD PRESSURE: 104 MMHG | TEMPERATURE: 97.8 F

## 2025-06-11 DIAGNOSIS — G43.019 INTRACTABLE MIGRAINE WITHOUT AURA AND WITHOUT STATUS MIGRAINOSUS: ICD-10-CM

## 2025-06-11 DIAGNOSIS — R40.4 AWARENESS ALTERATION, TRANSIENT: Primary | ICD-10-CM

## 2025-06-11 LAB
CCP IGA+IGG SERPL IA-ACNC: 8 UNITS (ref 0–19)
ENA JO1 AB SER-ACNC: <0.2 AI (ref 0–0.9)
ENA SCL70 AB SER-ACNC: <0.2 AI (ref 0–0.9)
ENA SS-A AB SER-ACNC: <0.2 AI (ref 0–0.9)
ENA SS-B AB SER-ACNC: <0.2 AI (ref 0–0.9)

## 2025-06-11 RX ORDER — TOPIRAMATE 25 MG/1
25 TABLET, FILM COATED ORAL 2 TIMES DAILY
Qty: 60 TABLET | Refills: 0 | Status: SHIPPED | OUTPATIENT
Start: 2025-06-11

## 2025-06-11 NOTE — PROGRESS NOTES
New Patient Office Visit      Patient Name: Americo Davis Jr.  : 1975   MRN: [id number]     Referring Physician: Josephine Conroy, *    Chief Complaint:    Chief Complaint   Patient presents with    Establish Care     Migraine x several years; reports vision changes and nausea with Migraines; patient reports Excedrin migraine helps, but he was told to stop them; last eye exam 2025;        History of Present Illness: Americo Davis Jr. is a 49 y.o. male who is here today to establish care with Neurology.  He says he started having migraines in his 20's. He says he has associated phonophobia, photophobia, nausea and vomiting with pulsating pain. He says he has overtaken Excedrin migraine over the years as it was the only thing that helped him.  He continues to use Excedrin Migraine but has been trying to reduce the amount down.  He has peptic ulcer disease and he understands the risk and taking it with this.    He was seen in the emergency department on 2025.  He had a CT of the head without contrast that was noncontributory.  At that time he had some altered mental status.  He reports going to [employer] and not even remembering driving there.  He has had this happen a couple of times.  He is to be on supplemental oxygen at night and is not compliant with this.  He also notes he was weak that day and had tremors in his arms and legs.  Today he notes tremors in his hands.  He notes heavy drug abuse x 15 years.  He has been sober for the past 2 years.    25 creatinine 1.3    Pertinent Medical History: PUD,Anemia, Hx drug abuse (meth and other pain pills, chemical/drugs) clean x 2 years, COPD, CHF, CAD, GERD, Gout, ASCVD (atherosclerotic cardiovascular,  disease), anxiety, asthma, headaches, HLD, anxiety, depression,  O2 @ night but is not compliant         Subjective      Review of Systems:   Review of Systems    Past Medical History:   Past Medical History:   Diagnosis Date    Anemia   "   Anxiety     Asthma     Back pain     Bleeding ulcer 04/2024    Body piercing 10/06/2021    ears    CHF (congestive heart failure)     Dr. Preciado LOV 2023    Chronic venous insufficiency     Colitis     Constipation     COPD (chronic obstructive pulmonary disease)     [place] or callus     Coronary artery disease     Depression     Esophagitis, erosive     followed by Dr. Graff    GERD (gastroesophageal reflux disease)     GI bleed     secondary to gastric ulcer - hospitalized 4/18-4/20/24    Gout     Headache     History of ASCVD (atherosclerotic cardiovascular disease)     History of blood transfusion     Spouse reported no reaction to transfuson     History of fracture 10/06/2021    Hx right pinky fracture - did require surgery    History of heart attack     Spouse reported \"2 light heart attacks at age 30\"     History of stomach ulcers     Hyperlipidemia     Hypertension     Migraine headache     Mild aortic valve sclerosis     Mild mitral insufficiency     Spouse reported MVP and that Dr Preciado said not sever enough to warrant surgery    On home oxygen therapy     Spouse reported at 2L/min NC prn    Peptic ulcer disease     chronic    Poor historian 10/06/2021    Snores     SOB (shortness of breath)     pt's mother reports at baseline 4/4/25    Tattoos     Vision problems     Weight loss     Recent Weight Loss Involuntary For Over A Year Or More - dates??       Past Surgical History:   Past Surgical History:   Procedure Laterality Date    APPENDECTOMY      COLONOSCOPY N/A 04/10/2025    Procedure: Colonoscopy with polypectomy;  Surgeon: Leonor Graff MD;  Location: [facility] ENDOSCOPY;  Service: Gastroenterology;  Laterality: N/A;    ENDOSCOPY N/A 11/05/2021    Procedure: ESOPHAGOGASTRODUODENOSCOPY WITH BIOPSY;  Surgeon: Leonor Graff MD;  Location: [facility] ENDOSCOPY;  Service: Gastroenterology;  Laterality: N/A;    ENDOSCOPY N/A 01/19/2023    Procedure: ESOPHAGOGASTRODUODENOSCOPY WITH BIOPSIES;  " Surgeon: Leonor Graff MD;  Location: [facility] ENDOSCOPY;  Service: Gastroenterology;  Laterality: N/A;    ENDOSCOPY N/A 04/19/2024    Procedure: ESOPHAGOGASTRODUODENOSCOPY with biopsy;  Surgeon: Leonor Graff MD;  Location: [facility] ENDOSCOPY;  Service: Gastroenterology;  Laterality: N/A;    ENDOSCOPY N/A 11/20/2024    Procedure: Esophagogastroduodenscopy with biopsy;  Surgeon: Leonor Graff MD;  Location: [facility] ENDOSCOPY;  Service: Gastroenterology;  Laterality: N/A;    ENDOSCOPY N/A 4/25/2025    Procedure: Esophagogastroduodenoscopy with biopsy;  Surgeon: Leonor Graff MD;  Location: [facility] ENDOSCOPY;  Service: Gastroenterology;  Laterality: N/A;    FRACTURE SURGERY Right     Pinky finger    WISDOM TOOTH EXTRACTION         Family History:   Family History   Problem Relation Age of Onset    Arthritis Mother     Thyroid disease Mother     Hypertension Mother     Migraines Sister     Thyroid disease Sister     Hypertension Sister     Mental illness Brother     Cancer Paternal Uncle     Arthritis Maternal Grandmother     Stroke Maternal Grandmother     Colon cancer Neg Hx     Cirrhosis Neg Hx     Liver cancer Neg Hx     Liver disease Neg Hx        Social History:   Social History     Socioeconomic History    Marital status:    Tobacco Use    Smoking status: Every Day     Current packs/day: 1.50     Average packs/day: 0.5 packs/day for 34.4 years (18.3 ttl pk-yrs)     Types: Cigarettes     Start date: 1991     Passive exposure: Current    Smokeless tobacco: Current     Types: Snuff   Vaping Use    Vaping status: Former    Substances: Nicotine (Spouse reported no use in 2 years), Flavoring    Devices: Disposable   Substance and Sexual Activity    Alcohol use: Not Currently     Comment: social    Drug use: Not Currently     Comment: opiates, snort    Sexual activity: Defer       Medications:     Current Outpatient Medications:     albuterol sulfate  (90 Base) MCG/ACT  inhaler, Inhale 2 puffs Every 4 (Four) Hours As Needed for Wheezing or Shortness of Air (Cough)., Disp: 6.7 g, Rfl: 5    ascorbic acid (VITAMIN C) 500 MG tablet, Take 1 tablet by mouth Daily., Disp: , Rfl:     buprenorphine-naloxone (SUBOXONE) 8-2 MG per SL tablet, Place 2 tablets under the tongue Daily., Disp: , Rfl:     docusate sodium (COLACE) 100 MG capsule, Take 1 capsule by mouth Every Evening., Disp: , Rfl:     famotidine (PEPCID) 40 MG tablet, Take 1 tablet by mouth Every Night., Disp: 90 tablet, Rfl: 3    FeroSul 325 (65 Fe) MG tablet, Take 1 tablet by mouth Daily., Disp: , Rfl:     Fluticasone-Umeclidin-Vilant (TRELEGY) 100-62.5-25 MCG/ACT inhaler, Inhale 1 puff Daily. Rinse mouth out after use, Disp: 1 each, Rfl: 5    folic acid (FOLVITE) 1 MG tablet, Take 1 tablet by mouth Daily., Disp: , Rfl:     hydroCHLOROthiazide 12.5 MG tablet, Take 1 tablet by mouth Daily., Disp: , Rfl:     losartan (COZAAR) 50 MG tablet, Take 1 tablet by mouth Daily., Disp: , Rfl:     metoprolol succinate XL (TOPROL-XL) 25 MG 24 hr tablet, Take 1 tablet by mouth Daily., Disp: , Rfl:     ondansetron ODT (ZOFRAN-ODT) 4 MG disintegrating tablet, Place 1 tablet on the tongue Every 8 (Eight) Hours As Needed for Nausea or Vomiting., Disp: 20 tablet, Rfl: 0    pantoprazole (PROTONIX) 40 MG EC tablet, Take 1 tablet by mouth Daily. Taking 2 per day, Disp: 90 tablet, Rfl: 3    PARoxetine (PAXIL) 20 MG tablet, Take 1 tablet by mouth Daily., Disp: , Rfl:     polyethylene glycol (MIRALAX) 17 g packet, Take 17 g by mouth Daily., Disp: 30 packet, Rfl: 0    senna 8.6 MG tablet, Take 1 tablet by mouth Daily., Disp: 14 tablet, Rfl: 0    vitamin B-12 (CYANOCOBALAMIN) 1000 MCG tablet, , Disp: , Rfl:     naloxone (NARCAN) 4 MG/0.1ML nasal spray, 1 spray into the nostril(s) as directed by provider As Needed (.). use for oversedation and call 911 (Patient not taking: Reported on 6/9/2025), Disp: 1 each, Rfl: 2    topiramate (Topamax) 25 MG tablet, Take  "1 tablet by mouth 2 (Two) Times a Day., Disp: 60 tablet, Rfl: 0    ubrogepant (Ubrelvy) 100 MG tablet, Take 1 tablet by mouth As Needed (ha)., Disp: 2 tablet, Rfl: 0    Allergies:   Allergies   Allergen Reactions    Sulfa Antibiotics Other (See Comments)     \"causes his insides to burn and his skin gets really red\" reported by patient's spouse        Objective     Physical Exam:  Vital Signs:   Vitals:    06/11/25 1329   BP: 104/60   Pulse: 80   Temp: 97.8 °F (36.6 °C)   SpO2: 96%   Weight: 67.1 kg (148 lb)   Height: 177.8 cm (70\")   PainSc: 0-No pain     Body mass index is 21.24 kg/m².     Physical Exam  Constitutional:       Appearance: Normal appearance.   HENT:      Head: Normocephalic.   Eyes:      General: Lids are normal.      Extraocular Movements: Extraocular movements intact.      Conjunctiva/sclera: Conjunctivae normal.   Pulmonary:      Effort: Pulmonary effort is normal.   Musculoskeletal:         General: Normal range of motion.   Skin:     General: Skin is warm and dry.   Neurological:      General: No focal deficit present.      Mental Status: He is alert and oriented to person, place, and time.      Cranial Nerves: Cranial nerves 2-12 are intact. No dysarthria.      Motor: Motor strength is normal.Tremor present. No pronator drift.      Coordination: Romberg sign positive. Finger-Nose-Finger Test normal.      Gait: Gait abnormal.      Comments: Fine tremors to bilateral outstretched hands  Slowed alternating movements  -Gait slightly forward flexed wide-based and awkward.   Psychiatric:         Attention and Perception: Attention normal.         Mood and Affect: Mood and affect normal.         Speech: Speech normal.         Behavior: Behavior normal. Behavior is cooperative.         Thought Content: Thought content normal.         Cognition and Memory: Cognition normal.         Judgment: Judgment normal.      Comments: Patient is very talkative and joking, has to be redirected at times "         Neurological Exam  Mental Status  Alert. Oriented to person, place, time and situation. Oriented to person, place, and time. Recent and remote memory are intact. Speech is normal. no dysarthria present. Language is fluent with no aphasia.    Cranial Nerves  CN III, IV, VI: Extraocular movements intact bilaterally. Normal lids and orbits bilaterally.  CN V: Facial sensation is normal.  CN VII: Full and symmetric facial movement.  CN IX, X: Palate elevates symmetrically  CN XI: Shoulder shrug strength is normal.  CN XII: Tongue midline without atrophy or fasciculations.    Motor  Normal muscle bulk throughout. No fasciculations present. Normal muscle tone. Strength is 5/5 throughout all four extremities.    Coordination   Tremor.    Gait   Abnormal gait. Romberg is present.      Assessment / Plan      Assessment/Plan:   Diagnoses and all orders for this visit:    1. Awareness alteration, transient (Primary)  -     EEG; Future    2. Intractable migraine without aura and without status migrainosus  -     topiramate (Topamax) 25 MG tablet; Take 1 tablet by mouth 2 (Two) Times a Day.  Dispense: 60 tablet; Refill: 0         This is a pleasant 49-year-old male patient here to establish care for migraine headaches.  Also notes that he has started having some tremors recently.  He was concerned about Parkinson's.  Long discussion with patient regarding benign tremors versus parkinsonian tremors.  Will continue to monitor and continue to have parkinsonism as a differential diagnoses however does not appear parkinsonian today.  Also discussed with patient effects of drug abuse on the brain it could also be cause of tremors.  Will treat for migraine headaches.  Have warned patient again about rebound headaches and the need to decrease and stop taking Excedrin Migraine.  Will provide topiramate 25 mg twice daily for patient.  1 month supply given as we will titrate this up as he tolerates.  Also gave samples of Ubrelvy as  patient is not a candidate for triptans.  He is also not a candidate for amitriptyline due to his other behavioral health medications as it would interact with those and he is already on metoprolol.  Indications and side effects discussed with patient he verbalizes understanding.  CT scan reviewed.  Will order EEG to rule out epilepsy.  Patient will call in the interim if se has any questions or concerns or changes.    Follow Up:   Return in about 3 months (around 9/11/2025).    DENNY Becker, FNP-[facility] Neurology and Sleep Medicine

## 2025-06-12 ENCOUNTER — RESULTS FOLLOW-UP (OUTPATIENT)
Dept: GASTROENTEROLOGY | Facility: CLINIC | Age: 50
End: 2025-06-12
Payer: MEDICAID

## 2025-06-12 LAB — ANA SER QL IF: NEGATIVE

## 2025-06-12 NOTE — TELEPHONE ENCOUNTER
----- Message from Mallory DONATO sent at 6/12/2025  9:13 AM EDT -----    ----- Message -----  From: Leonor Graff MD  Sent: 6/11/2025   8:05 PM EDT  To: ludmila [facility]    Let him know that his kidney numbers slightly up  He needs to drink plenty of water  He also needs to call his PCP.  He may need a reduction in his losartan dose  ----- Message -----  From: Lab, Background User  Sent: 6/10/2025   1:36 AM EDT  To: Leonor Graff MD

## 2025-06-12 NOTE — TELEPHONE ENCOUNTER
----- Message from Mallory DONATO sent at 6/12/2025  9:13 AM EDT -----    ----- Message -----  From: Leonor Graff MD  Sent: 6/11/2025   8:05 PM EDT  To: ludmila [facility]    Let him know that his kidney numbers slightly up  He needs to drink plenty of water  He also needs to call his PCP.  He may need a reduction in his losartan dose  ----- Message -----  From: Lab, Background User  Sent: 6/10/2025   1:36 AM EDT  To: Leonor Graff MD

## 2025-06-13 ENCOUNTER — PATIENT ROUNDING (BHMG ONLY) (OUTPATIENT)
Dept: NEUROLOGY | Facility: CLINIC | Age: 50
End: 2025-06-13
Payer: MEDICAID

## 2025-06-13 NOTE — TELEPHONE ENCOUNTER
----- Message from Jean Claude Charlee sent at 2/20/2024 11:24 AM CST -----  Regarding: Appt Request  Appointment Request      Caller is requesting an appointment.      Name of Caller: Pt     Symptoms:  R21 (ICD-10-CM) - Facial rash    Would the patient rather a call back or a response via [employer]?  Call back    Best Call Back Number: [phone]      Additional Information: Sts would like to be seen as soon as possible and get a call from the office.  Please Advise - Thank you         ----- Message from Mallory DONATO sent at 6/12/2025  9:13 AM EDT -----    ----- Message -----  From: Leonor Graff MD  Sent: 6/11/2025   8:05 PM EDT  To: ludmila [facility]    Let him know that his kidney numbers slightly up  He needs to drink plenty of water  He also needs to call his PCP.  He may need a reduction in his losartan dose  ----- Message -----  From: Lab, Background User  Sent: 6/10/2025   1:36 AM EDT  To: Leonor Graff MD     Initial (On Arrival)

## 2025-07-07 ENCOUNTER — TELEPHONE (OUTPATIENT)
Dept: NEUROLOGY | Facility: CLINIC | Age: 50
End: 2025-07-07

## 2025-07-08 DIAGNOSIS — G43.019 INTRACTABLE MIGRAINE WITHOUT AURA AND WITHOUT STATUS MIGRAINOSUS: ICD-10-CM

## 2025-07-08 RX ORDER — TOPIRAMATE 50 MG/1
50 TABLET, FILM COATED ORAL 2 TIMES DAILY
Qty: 60 TABLET | Refills: 2 | Status: SHIPPED | OUTPATIENT
Start: 2025-07-08

## 2025-07-10 ENCOUNTER — TELEPHONE (OUTPATIENT)
Dept: NEUROLOGY | Facility: CLINIC | Age: 50
End: 2025-07-10

## 2025-07-10 DIAGNOSIS — R40.4 AWARENESS ALTERATION, TRANSIENT: Primary | ICD-10-CM

## 2025-07-18 ENCOUNTER — TELEPHONE (OUTPATIENT)
Dept: NEUROLOGY | Facility: CLINIC | Age: 50
End: 2025-07-18

## 2025-07-24 ENCOUNTER — TELEPHONE (OUTPATIENT)
Dept: NEUROLOGY | Facility: CLINIC | Age: 50
End: 2025-07-24

## 2025-07-29 ENCOUNTER — HOSPITAL ENCOUNTER (OUTPATIENT)
Dept: SLEEP MEDICINE | Facility: HOSPITAL | Age: 50
Discharge: HOME | End: 2025-07-29
Admitting: NURSE PRACTITIONER
Payer: MEDICAID

## 2025-07-29 DIAGNOSIS — R40.4 AWARENESS ALTERATION, TRANSIENT: ICD-10-CM

## 2025-07-29 PROCEDURE — 95816 EEG AWAKE AND DROWSY: CPT

## (undated) DEVICE — Device

## (undated) DEVICE — HYBRID CO2 TUBING/CAP SET FOR OLYMPUS® SCOPES & CO2 SOURCE: Brand: ERBE

## (undated) DEVICE — CONMED SCOPE SAVER BITE BLOCK, 20X27 MM: Brand: SCOPE SAVER

## (undated) DEVICE — VLV SXN AIR/H2O ORCAPOD3 1P/U STRL

## (undated) DEVICE — FRCP BX RADJAW4 NDL 2.8 240 STD OG

## (undated) DEVICE — CANISTER, RIGID, 2000CC: Brand: MEDLINE INDUSTRIES, INC.

## (undated) DEVICE — ENDOSCOPY PORT CONNECTOR FOR OLYMPUS® SCOPES: Brand: ERBE

## (undated) DEVICE — LUBE JELLY PK/2.75GM STRL BX/144

## (undated) DEVICE — QUICK CATCH IN-LINE SUCTION POLYP TRAP IS USED FOR SUCTION RETRIEVAL OF ENDOSCOPICALLY REMOVED POLYPS.

## (undated) DEVICE — SYR LUER SLPTP 50ML

## (undated) DEVICE — SINGLE-USE POLYPECTOMY SNARE: Brand: CAPTIVATOR II

## (undated) DEVICE — FRCP BIOP RADIALJAW4 STD/CAP 2.2MM 240CM ORNG

## (undated) DEVICE — Device: Brand: SINGLE USE INJECTOR